# Patient Record
Sex: MALE | Race: WHITE | NOT HISPANIC OR LATINO
[De-identification: names, ages, dates, MRNs, and addresses within clinical notes are randomized per-mention and may not be internally consistent; named-entity substitution may affect disease eponyms.]

---

## 2017-02-15 ENCOUNTER — RX RENEWAL (OUTPATIENT)
Age: 67
End: 2017-02-15

## 2019-01-27 ENCOUNTER — INPATIENT (INPATIENT)
Facility: HOSPITAL | Age: 69
LOS: 2 days | Discharge: HOME | End: 2019-01-30
Attending: INTERNAL MEDICINE | Admitting: INTERNAL MEDICINE
Payer: COMMERCIAL

## 2019-01-27 VITALS
SYSTOLIC BLOOD PRESSURE: 136 MMHG | RESPIRATION RATE: 16 BRPM | OXYGEN SATURATION: 100 % | HEART RATE: 74 BPM | DIASTOLIC BLOOD PRESSURE: 92 MMHG | TEMPERATURE: 99 F

## 2019-01-27 DIAGNOSIS — I10 ESSENTIAL (PRIMARY) HYPERTENSION: ICD-10-CM

## 2019-01-27 DIAGNOSIS — R07.9 CHEST PAIN, UNSPECIFIED: ICD-10-CM

## 2019-01-27 DIAGNOSIS — Z95.1 PRESENCE OF AORTOCORONARY BYPASS GRAFT: Chronic | ICD-10-CM

## 2019-01-27 DIAGNOSIS — I25.110 ATHEROSCLEROTIC HEART DISEASE OF NATIVE CORONARY ARTERY WITH UNSTABLE ANGINA PECTORIS: ICD-10-CM

## 2019-01-27 DIAGNOSIS — Z29.9 ENCOUNTER FOR PROPHYLACTIC MEASURES, UNSPECIFIED: ICD-10-CM

## 2019-01-27 DIAGNOSIS — M79.2 NEURALGIA AND NEURITIS, UNSPECIFIED: ICD-10-CM

## 2019-01-27 DIAGNOSIS — E11.9 TYPE 2 DIABETES MELLITUS WITHOUT COMPLICATIONS: ICD-10-CM

## 2019-01-27 DIAGNOSIS — Z95.4 PRESENCE OF OTHER HEART-VALVE REPLACEMENT: Chronic | ICD-10-CM

## 2019-01-27 LAB
ALBUMIN SERPL ELPH-MCNC: 3.9 G/DL — SIGNIFICANT CHANGE UP (ref 3.3–5)
ALP SERPL-CCNC: 73 U/L — SIGNIFICANT CHANGE UP (ref 40–120)
ALT FLD-CCNC: 19 U/L — SIGNIFICANT CHANGE UP (ref 4–41)
ANION GAP SERPL CALC-SCNC: 11 MMO/L — SIGNIFICANT CHANGE UP (ref 7–14)
APTT BLD: 30.5 SEC — SIGNIFICANT CHANGE UP (ref 27.5–36.3)
AST SERPL-CCNC: 20 U/L — SIGNIFICANT CHANGE UP (ref 4–40)
BASE EXCESS BLDV CALC-SCNC: 1.2 MMOL/L — SIGNIFICANT CHANGE UP
BASOPHILS # BLD AUTO: 0.05 K/UL — SIGNIFICANT CHANGE UP (ref 0–0.2)
BASOPHILS NFR BLD AUTO: 0.7 % — SIGNIFICANT CHANGE UP (ref 0–2)
BILIRUB SERPL-MCNC: 0.4 MG/DL — SIGNIFICANT CHANGE UP (ref 0.2–1.2)
BLOOD GAS VENOUS - CREATININE: 0.74 MG/DL — SIGNIFICANT CHANGE UP (ref 0.5–1.3)
BUN SERPL-MCNC: 13 MG/DL — SIGNIFICANT CHANGE UP (ref 7–23)
CALCIUM SERPL-MCNC: 8.7 MG/DL — SIGNIFICANT CHANGE UP (ref 8.4–10.5)
CHLORIDE BLDV-SCNC: 113 MMOL/L — HIGH (ref 96–108)
CHLORIDE SERPL-SCNC: 106 MMOL/L — SIGNIFICANT CHANGE UP (ref 98–107)
CK MB BLD-MCNC: 2.21 NG/ML — SIGNIFICANT CHANGE UP (ref 1–6.6)
CK SERPL-CCNC: 73 U/L — SIGNIFICANT CHANGE UP (ref 30–200)
CO2 SERPL-SCNC: 24 MMOL/L — SIGNIFICANT CHANGE UP (ref 22–31)
CREAT SERPL-MCNC: 0.96 MG/DL — SIGNIFICANT CHANGE UP (ref 0.5–1.3)
EOSINOPHIL # BLD AUTO: 0.14 K/UL — SIGNIFICANT CHANGE UP (ref 0–0.5)
EOSINOPHIL NFR BLD AUTO: 1.9 % — SIGNIFICANT CHANGE UP (ref 0–6)
GAS PNL BLDV: 136 MMOL/L — SIGNIFICANT CHANGE UP (ref 136–146)
GLUCOSE BLDC GLUCOMTR-MCNC: 143 MG/DL — HIGH (ref 70–99)
GLUCOSE BLDC GLUCOMTR-MCNC: 149 MG/DL — HIGH (ref 70–99)
GLUCOSE BLDV-MCNC: 135 — HIGH (ref 70–99)
GLUCOSE SERPL-MCNC: 130 MG/DL — HIGH (ref 70–99)
HCO3 BLDV-SCNC: 25 MMOL/L — SIGNIFICANT CHANGE UP (ref 20–27)
HCT VFR BLD CALC: 40.8 % — SIGNIFICANT CHANGE UP (ref 39–50)
HCT VFR BLDV CALC: 44 % — SIGNIFICANT CHANGE UP (ref 39–51)
HGB BLD-MCNC: 14 G/DL — SIGNIFICANT CHANGE UP (ref 13–17)
HGB BLDV-MCNC: 14.3 G/DL — SIGNIFICANT CHANGE UP (ref 13–17)
IMM GRANULOCYTES NFR BLD AUTO: 0.3 % — SIGNIFICANT CHANGE UP (ref 0–1.5)
INR BLD: 1.14 — SIGNIFICANT CHANGE UP (ref 0.88–1.17)
LACTATE BLDV-MCNC: 1.3 MMOL/L — SIGNIFICANT CHANGE UP (ref 0.5–2)
LYMPHOCYTES # BLD AUTO: 2.23 K/UL — SIGNIFICANT CHANGE UP (ref 1–3.3)
LYMPHOCYTES # BLD AUTO: 30.4 % — SIGNIFICANT CHANGE UP (ref 13–44)
MCHC RBC-ENTMCNC: 31.7 PG — SIGNIFICANT CHANGE UP (ref 27–34)
MCHC RBC-ENTMCNC: 34.3 % — SIGNIFICANT CHANGE UP (ref 32–36)
MCV RBC AUTO: 92.5 FL — SIGNIFICANT CHANGE UP (ref 80–100)
MONOCYTES # BLD AUTO: 0.58 K/UL — SIGNIFICANT CHANGE UP (ref 0–0.9)
MONOCYTES NFR BLD AUTO: 7.9 % — SIGNIFICANT CHANGE UP (ref 2–14)
NEUTROPHILS # BLD AUTO: 4.32 K/UL — SIGNIFICANT CHANGE UP (ref 1.8–7.4)
NEUTROPHILS NFR BLD AUTO: 58.8 % — SIGNIFICANT CHANGE UP (ref 43–77)
NRBC # FLD: 0 K/UL — LOW (ref 25–125)
NT-PROBNP SERPL-SCNC: 156.3 PG/ML — SIGNIFICANT CHANGE UP
PCO2 BLDV: 46 MMHG — SIGNIFICANT CHANGE UP (ref 41–51)
PH BLDV: 7.37 PH — SIGNIFICANT CHANGE UP (ref 7.32–7.43)
PLATELET # BLD AUTO: 295 K/UL — SIGNIFICANT CHANGE UP (ref 150–400)
PMV BLD: 9.6 FL — SIGNIFICANT CHANGE UP (ref 7–13)
PO2 BLDV: 40 MMHG — SIGNIFICANT CHANGE UP (ref 35–40)
POTASSIUM BLDV-SCNC: 3.8 MMOL/L — SIGNIFICANT CHANGE UP (ref 3.4–4.5)
POTASSIUM SERPL-MCNC: 4.1 MMOL/L — SIGNIFICANT CHANGE UP (ref 3.5–5.3)
POTASSIUM SERPL-SCNC: 4.1 MMOL/L — SIGNIFICANT CHANGE UP (ref 3.5–5.3)
PROT SERPL-MCNC: 7 G/DL — SIGNIFICANT CHANGE UP (ref 6–8.3)
PROTHROM AB SERPL-ACNC: 13 SEC — SIGNIFICANT CHANGE UP (ref 9.8–13.1)
RBC # BLD: 4.41 M/UL — SIGNIFICANT CHANGE UP (ref 4.2–5.8)
RBC # FLD: 12.7 % — SIGNIFICANT CHANGE UP (ref 10.3–14.5)
SAO2 % BLDV: 74.1 % — SIGNIFICANT CHANGE UP (ref 60–85)
SODIUM SERPL-SCNC: 141 MMOL/L — SIGNIFICANT CHANGE UP (ref 135–145)
TROPONIN T, HIGH SENSITIVITY: 9 NG/L — SIGNIFICANT CHANGE UP (ref ?–14)
TROPONIN T, HIGH SENSITIVITY: 9 NG/L — SIGNIFICANT CHANGE UP (ref ?–14)
WBC # BLD: 7.34 K/UL — SIGNIFICANT CHANGE UP (ref 3.8–10.5)
WBC # FLD AUTO: 7.34 K/UL — SIGNIFICANT CHANGE UP (ref 3.8–10.5)

## 2019-01-27 PROCEDURE — 71046 X-RAY EXAM CHEST 2 VIEWS: CPT | Mod: 26

## 2019-01-27 PROCEDURE — 99223 1ST HOSP IP/OBS HIGH 75: CPT

## 2019-01-27 RX ORDER — CLOPIDOGREL BISULFATE 75 MG/1
75 TABLET, FILM COATED ORAL DAILY
Qty: 0 | Refills: 0 | Status: DISCONTINUED | OUTPATIENT
Start: 2019-01-28 | End: 2019-01-30

## 2019-01-27 RX ORDER — ASPIRIN/CALCIUM CARB/MAGNESIUM 324 MG
81 TABLET ORAL DAILY
Qty: 0 | Refills: 0 | Status: DISCONTINUED | OUTPATIENT
Start: 2019-01-28 | End: 2019-01-30

## 2019-01-27 RX ORDER — DEXTROSE 50 % IN WATER 50 %
12.5 SYRINGE (ML) INTRAVENOUS ONCE
Qty: 0 | Refills: 0 | Status: DISCONTINUED | OUTPATIENT
Start: 2019-01-27 | End: 2019-01-30

## 2019-01-27 RX ORDER — DEXTROSE 50 % IN WATER 50 %
25 SYRINGE (ML) INTRAVENOUS ONCE
Qty: 0 | Refills: 0 | Status: DISCONTINUED | OUTPATIENT
Start: 2019-01-27 | End: 2019-01-30

## 2019-01-27 RX ORDER — INSULIN LISPRO 100/ML
VIAL (ML) SUBCUTANEOUS AT BEDTIME
Qty: 0 | Refills: 0 | Status: DISCONTINUED | OUTPATIENT
Start: 2019-01-27 | End: 2019-01-30

## 2019-01-27 RX ORDER — ISOSORBIDE MONONITRATE 60 MG/1
30 TABLET, EXTENDED RELEASE ORAL DAILY
Qty: 0 | Refills: 0 | Status: DISCONTINUED | OUTPATIENT
Start: 2019-01-27 | End: 2019-01-30

## 2019-01-27 RX ORDER — DEXTROSE 50 % IN WATER 50 %
15 SYRINGE (ML) INTRAVENOUS ONCE
Qty: 0 | Refills: 0 | Status: DISCONTINUED | OUTPATIENT
Start: 2019-01-27 | End: 2019-01-30

## 2019-01-27 RX ORDER — ASPIRIN/CALCIUM CARB/MAGNESIUM 324 MG
162 TABLET ORAL ONCE
Qty: 0 | Refills: 0 | Status: COMPLETED | OUTPATIENT
Start: 2019-01-27 | End: 2019-01-27

## 2019-01-27 RX ORDER — INSULIN LISPRO 100/ML
VIAL (ML) SUBCUTANEOUS
Qty: 0 | Refills: 0 | Status: DISCONTINUED | OUTPATIENT
Start: 2019-01-27 | End: 2019-01-30

## 2019-01-27 RX ORDER — HEPARIN SODIUM 5000 [USP'U]/ML
INJECTION INTRAVENOUS; SUBCUTANEOUS
Qty: 25000 | Refills: 0 | Status: DISCONTINUED | OUTPATIENT
Start: 2019-01-27 | End: 2019-01-28

## 2019-01-27 RX ORDER — CLOPIDOGREL BISULFATE 75 MG/1
TABLET, FILM COATED ORAL
Qty: 0 | Refills: 0 | Status: DISCONTINUED | OUTPATIENT
Start: 2019-01-27 | End: 2019-01-30

## 2019-01-27 RX ORDER — ACETAMINOPHEN 500 MG
650 TABLET ORAL ONCE
Qty: 0 | Refills: 0 | Status: COMPLETED | OUTPATIENT
Start: 2019-01-27 | End: 2019-01-27

## 2019-01-27 RX ORDER — HEPARIN SODIUM 5000 [USP'U]/ML
6000 INJECTION INTRAVENOUS; SUBCUTANEOUS EVERY 6 HOURS
Qty: 0 | Refills: 0 | Status: DISCONTINUED | OUTPATIENT
Start: 2019-01-27 | End: 2019-01-28

## 2019-01-27 RX ORDER — CLOPIDOGREL BISULFATE 75 MG/1
600 TABLET, FILM COATED ORAL ONCE
Qty: 0 | Refills: 0 | Status: COMPLETED | OUTPATIENT
Start: 2019-01-27 | End: 2019-01-27

## 2019-01-27 RX ORDER — GLUCAGON INJECTION, SOLUTION 0.5 MG/.1ML
1 INJECTION, SOLUTION SUBCUTANEOUS ONCE
Qty: 0 | Refills: 0 | Status: DISCONTINUED | OUTPATIENT
Start: 2019-01-27 | End: 2019-01-30

## 2019-01-27 RX ORDER — LISINOPRIL 2.5 MG/1
5 TABLET ORAL DAILY
Qty: 0 | Refills: 0 | Status: DISCONTINUED | OUTPATIENT
Start: 2019-01-27 | End: 2019-01-30

## 2019-01-27 RX ORDER — METOPROLOL TARTRATE 50 MG
25 TABLET ORAL
Qty: 0 | Refills: 0 | Status: DISCONTINUED | OUTPATIENT
Start: 2019-01-27 | End: 2019-01-30

## 2019-01-27 RX ORDER — HEPARIN SODIUM 5000 [USP'U]/ML
5000 INJECTION INTRAVENOUS; SUBCUTANEOUS ONCE
Qty: 0 | Refills: 0 | Status: COMPLETED | OUTPATIENT
Start: 2019-01-27 | End: 2019-01-27

## 2019-01-27 RX ORDER — ATORVASTATIN CALCIUM 80 MG/1
80 TABLET, FILM COATED ORAL AT BEDTIME
Qty: 0 | Refills: 0 | Status: DISCONTINUED | OUTPATIENT
Start: 2019-01-27 | End: 2019-01-30

## 2019-01-27 RX ADMIN — Medication 25 MILLIGRAM(S): at 19:08

## 2019-01-27 RX ADMIN — CLOPIDOGREL BISULFATE 600 MILLIGRAM(S): 75 TABLET, FILM COATED ORAL at 19:08

## 2019-01-27 RX ADMIN — HEPARIN SODIUM 1000 UNIT(S)/HR: 5000 INJECTION INTRAVENOUS; SUBCUTANEOUS at 19:09

## 2019-01-27 RX ADMIN — ATORVASTATIN CALCIUM 80 MILLIGRAM(S): 80 TABLET, FILM COATED ORAL at 21:45

## 2019-01-27 RX ADMIN — ISOSORBIDE MONONITRATE 30 MILLIGRAM(S): 60 TABLET, EXTENDED RELEASE ORAL at 19:08

## 2019-01-27 RX ADMIN — HEPARIN SODIUM 5000 UNIT(S): 5000 INJECTION INTRAVENOUS; SUBCUTANEOUS at 19:08

## 2019-01-27 RX ADMIN — Medication 650 MILLIGRAM(S): at 22:03

## 2019-01-27 RX ADMIN — Medication 162 MILLIGRAM(S): at 12:56

## 2019-01-27 NOTE — CONSULT NOTE ADULT - ATTENDING COMMENTS
Patient seen and examined with and above note reviewed and I agree with assessment and plan as outlined. Patient presenting with intermittent right arm numbness and tingling and cramping pains in his right arm. Worse with increased use and repetitive movements and associated with right neck pain and discomfort and headaches.  Exam shows tenderness to palpation over right trapezius and deltoid but strength is intact and sensation intact to LT and PP and reflexes 1 to 2 plus in bilateral arms.   Worsening of symptoms with neck rotation to the right.  Will proceed with cervical MRI to assess fro cervical radiculopathy and continue heat and ice application as needed.  PT evaluation   Continue cardiac and medical mgt and supportive care and all questions answered and patient understood plan.

## 2019-01-27 NOTE — H&P ADULT - NEUROLOGICAL DETAILS
responds to pain/sensation intact/cranial nerves intact/alert and oriented x 3/responds to verbal commands

## 2019-01-27 NOTE — CONSULT NOTE ADULT - SUBJECTIVE AND OBJECTIVE BOX
*************************************  NEUROLOGY CONSULT  NOTE  **************************************    IVANNA JETT  Male  [id number]    HPI:   69 yo RH  man w/ h/o CABG, HTN, R clavicular Fx (in 1990s) presents with intermittent chest pain over past 2 days. Neuro consulted for RUE crampy pain associated with intermittent weakness of hand going on for > 1month as per patient. States intermittently his RUE biceps get crampy pain; and sometimes he suddenly drops things from his right hand. Reports its mostly the right hand; the arm and forearm usually feels normal strength during these episodes. Pt currently asymptomatic; Exam is non focal except for mild tenderness to deep palpation over R lateral neck. Pt states he works as a . No Hx of neck manipulation, trauma, stroke or seizure.   Patient denies new trauma, LOC, fever, chills, HA, vision changes, tinnitus, dysarthria, dysphagia, palpitations, SOB, nausea, vomiting, diarrhea, dysuria and incontinence.  NIHSS =0, MRS =0      ROS: All negative except as mentioned in HPI    PAST MEDICAL & SURGICAL HISTORY:  Myocardial infarct  H/O heart bypass surgery  Valvular disease  Myocardial infarct  Diabetes mellitus  Scarlet Fever  Systolic Murmur  Other and Unspecified Hyperlipidemia  Benign Essential Hypertension  History of heart valve replacement  S/P CABG (coronary artery bypass graft)  Traumatic Amputation of Finger(s)    Allergies  No Known Allergies  Intolerances    VITAL SIGNS:  Vital Signs Last 24 Hrs  T(C): 37.1 (27 Jan 2019 12:59), Max: 37.1 (27 Jan 2019 11:19)  T(F): 98.8 (27 Jan 2019 12:59), Max: 98.8 (27 Jan 2019 11:19)  HR: 72 (27 Jan 2019 12:59) (72 - 74)  BP: 142/86 (27 Jan 2019 12:59) (136/92 - 142/86)  BP(mean): --  RR: 16 (27 Jan 2019 12:59) (16 - 16)  SpO2: 100% (27 Jan 2019 12:59) (100% - 100%)    PHYSICAL EXAMINATION:  General: obese, in no acute distress.  Eyes: Conjunctiva and sclera clear.  Neck: Supple, full rom, mild tenderness over the R clavicular region and C-spine, which produces his crampy pain in RUE  Skin: no rash, no edema noted  Lung: no respiratory distress noted  Neurologic:  - Mental Status:  Alert, awake, oriented to person, place, and time; Speech is fluent with intact naming, repetition, and comprehension; crosses midline, no extinction or neglect noted; good recall  - Cranial Nerves II-XII:  VFF, No nystagmus or APD noted, EOMI, PERRLA, V1-V3 intact, no facial asymmetry, t/p midline, SCM/trap intact.  - Motor:  Strength is 5/5 throughout including RUE proximally and distally.  There is no pronator drift.  Normal muscle bulk and tone throughout. No myoclonus or tremor.  - Reflexes:  1+ and symmetric at the biceps, triceps, brachioradialis, knees, and ankles.  Plantar responses mute.  - Sensory:  Intact to light touch throughout; states RUE more mildly more sensitive than LUE  - Coordination:  Finger-nose-finger and heel-knee-shin intact without dysmetria.  Rapid alternating hand movements intact.  - Gait:   Normal steps, base, arm swing, and turning.  Heel and toe walking are normal.  Tandem gait is normal.  Romberg testing is negative.    LABS:                          14.0   7.34  )-----------( 295      ( 27 Jan 2019 12:50 )             40.8     01-27    141  |  106  |  13  ----------------------------<  130<H>  4.1   |  24  |  0.96    Ca    8.7      27 Jan 2019 12:50    TPro  7.0  /  Alb  3.9  /  TBili  0.4  /  DBili  x   /  AST  20  /  ALT  19  /  AlkPhos  73  01-27        RADIOLOGY & ADDITIONAL STUDIES:    non available

## 2019-01-27 NOTE — H&P ADULT - ASSESSMENT
67 yo male PMHx CAD, CABG x2, HTN, HLD, and DM2 (non-compliant w/ meds x 2  years) p/w chest pain at rest x 3 days, similar to one he had before CABG admitted to [facility] for Unstable Angina Pectoris.    +Unstable Angina Pectoris-->Plavix load, Hep gtt (after coags), Statin, ASA.

## 2019-01-27 NOTE — H&P ADULT - HISTORY OF PRESENT ILLNESS
69 yo male PMHx CAD, CABG x2, HTN, HLD, and DM2 (non-compliant w/ meds x 2  years) p/w chest pain at rest x 3 days. Chest pain left sided chest tightness, 4/10, intermittent in nature where each episode lasts 1 hour, non-pleuritic, non-radiating in nature. Chest pain getting worse since yesterday where episodes are more frequent and similar to one he had before CABG 7 yrs ago. Pt also c/o right shoulder pain radiating to R arm for past 1 month. Pt denies weakness, fever, chills, diaphoresis, SOB, palpitations, nausea, vomiting or abdominal pain. No alleviating nor aggravating factors identified.    Off note: pt lost his PCP and insurance 2 years ago and had been off his home medications ever since. Reports now he has new health insurance, but still doesn't have PCP.

## 2019-01-27 NOTE — ED PROVIDER NOTE - PMH
Benign Essential Hypertension    Diabetes mellitus    H/O heart bypass surgery    Myocardial infarct    Myocardial infarct    Other and Unspecified Hyperlipidemia    Scarlet Fever    Systolic Murmur    Valvular disease

## 2019-01-27 NOTE — H&P ADULT - PROBLEM SELECTOR PLAN 1
tele monitor   cardiac enzymes x 2  Serial EKGs  DASH 1800 ADA diet   FLP, HgA1c, TSH  Discussed with Dr. Helms: will load pt with Plavix 600mg, then 75mg Daily.  Will start heparin drip after INR/PTT results come back.  Will start on Metoprolol 25mg po BID, ASA 81mg, Lipitor 80mg qhs, Lisinopril 5mg and imdur 30mg po daily.  NPO p MN for Cardia Cath tomorrow

## 2019-01-27 NOTE — ED PROVIDER NOTE - MUSCULOSKELETAL, MLM
Spine appears normal, no midline TTP.  5/5 strength in extremities currently.  +weeping blister to left shin with 2+ pitting edema BLE

## 2019-01-27 NOTE — ED ADULT NURSE NOTE - OBJECTIVE STATEMENT
Pt w/ hx of MI CAD no-anticoagulants CHF  c/o intermittent chest pain x 2 days w/ associated BLE swelling and right hand weakness Pt denies SOB diaphoresis HA Dizziness NV Pt p/w NAD breaths even unlabored skin warm dry intact NSR on monitor 20 g IV access obtained at L.forearm labs as ordered. Will monitor.

## 2019-01-27 NOTE — ED ADULT NURSE NOTE - CHIEF COMPLAINT QUOTE
pt here for intermittent chest pain x 2 days (pain feels similar to when he needed bypass surgery), right arm pain since last night, intermittent right arm weakness x 1 week, and weeping to left lower extremity x 3 days. pmhx: mi, cabg and valve replacement

## 2019-01-27 NOTE — CONSULT NOTE ADULT - ASSESSMENT
69 yo RH  man w/ h/o CABG, HTN, R clavicular Fx (in 1990s) presents with intermittent chest pain over past 2 days. Neuro consulted for RUE crampy pain associated with intermittent weakness of hand going on for > 1month as per patient. States intermittently his RUE biceps get crampy pain; and sometimes he suddenly drops things from his right hand.     Impression: Sx and hx indicative of radicular etiology of his RUE Sx given subacute nature and reproducible exam.    - MRI C-spine w/o   - PT/OT eval  - chest pain w/u as per primary team, cardio  - further reccs based on above

## 2019-01-27 NOTE — H&P ADULT - NSHPLABSRESULTS_GEN_ALL_CORE
EKG: Sinus Rhythm 1st deg HB @ 66 bpm, TWI I, AVL. Q-waves V1-V2. Poor R-wave V1-V6, LAD.  Trop: 9-->9  proBNP: 156  CXR: Status post median sternotomy. Lungs are clear. Status post aortic valve replacement. There is no acute abnormality of the visualized osseous structures. There is a healed right clavicular fracture.

## 2019-01-27 NOTE — ED ADULT NURSE NOTE - PSH
History of heart valve replacement    S/P CABG (coronary artery bypass graft)    Traumatic Amputation of Finger(s)

## 2019-01-27 NOTE — H&P ADULT - RS GEN PE MLT RESP DETAILS PC
no rhonchi/no wheezes/respirations non-labored/breath sounds equal/clear to auscultation bilaterally/good air movement/no rales/no chest wall tenderness/airway patent

## 2019-01-27 NOTE — ED PROVIDER NOTE - MEDICAL DECISION MAKING DETAILS
# CP, concern for ACS  - cbc, cmp, trop, ekg,. cxr, plan for tele admission  # R hand weakness  - likely cervical radiculopathy, consider MRI while inpatient

## 2019-01-27 NOTE — ED PROVIDER NOTE - OBJECTIVE STATEMENT
68M h/o CABG, HTN, presents with intermittent chest pain over past 2 days.  Pain is left sided and radiates to right shoulder, not exertionally related.  States it feels similar to previous MI.  +Intermittent dizziness/lightheadedness.  Separately, patient also c/o intermittent right hand weakness over past week.  Notices that he intermittently drops items.  +neck pain at times.  Also c/o tinnitus (long standing).  Self discontinued aspirin.

## 2019-01-27 NOTE — H&P ADULT - PROBLEM SELECTOR PLAN 2
Daily glucose monitoring  insulin sliding scale  DASH 1800 ADA diet  started Lisinopril 5mg po daily (renal protective) and ASA 81mg  serum HgA1C

## 2019-01-27 NOTE — ED ADULT TRIAGE NOTE - CHIEF COMPLAINT QUOTE
pt here for intermittent chest pain x 2 days (pain feels similar to when he needed bypass surgery), right arm pain since last night, intermittent right arm weakness x 1 week, and weeping to left lower extremity x 3 days. pmhx: cabg and valve replacement pt here for intermittent chest pain x 2 days (pain feels similar to when he needed bypass surgery), right arm pain since last night, intermittent right arm weakness x 1 week, and weeping to left lower extremity x 3 days. pmhx: mi, cabg and valve replacement

## 2019-01-27 NOTE — H&P ADULT - NSHPSOCIALHISTORY_GEN_ALL_CORE
Pt , lives with spouse. Denies smoking, drinking or drugs.   Pt admits to drinking 12 cups of coffee/day.  Pt admits to walking freely without any aids and able to walk at least 5 blocks.

## 2019-01-27 NOTE — ED PROVIDER NOTE - ATTENDING CONTRIBUTION TO CARE
I performed the initial face to face bedside interview with this patient regarding history of present illness, review of symptoms and past medical, social and family history.  I completed an independent physical examination.  I was the initial provider who evaluated this patient.  The history, review of symptoms and examination was documented by me.  I have signed out the follow up of any pending tests (i.e. labs, radiological studies) to the resident.  I have discussed the patient’s plan of care and disposition with the residemt/

## 2019-01-28 LAB
ANION GAP SERPL CALC-SCNC: 10 MMO/L — SIGNIFICANT CHANGE UP (ref 7–14)
APTT BLD: 41.8 SEC — HIGH (ref 27.5–36.3)
APTT BLD: 48 SEC — HIGH (ref 27.5–36.3)
BUN SERPL-MCNC: 15 MG/DL — SIGNIFICANT CHANGE UP (ref 7–23)
CALCIUM SERPL-MCNC: 8.5 MG/DL — SIGNIFICANT CHANGE UP (ref 8.4–10.5)
CHLORIDE SERPL-SCNC: 108 MMOL/L — HIGH (ref 98–107)
CHOLEST SERPL-MCNC: 183 MG/DL — SIGNIFICANT CHANGE UP (ref 120–199)
CO2 SERPL-SCNC: 23 MMOL/L — SIGNIFICANT CHANGE UP (ref 22–31)
CREAT SERPL-MCNC: 0.96 MG/DL — SIGNIFICANT CHANGE UP (ref 0.5–1.3)
GLUCOSE BLDC GLUCOMTR-MCNC: 100 MG/DL — HIGH (ref 70–99)
GLUCOSE BLDC GLUCOMTR-MCNC: 125 MG/DL — HIGH (ref 70–99)
GLUCOSE BLDC GLUCOMTR-MCNC: 142 MG/DL — HIGH (ref 70–99)
GLUCOSE BLDC GLUCOMTR-MCNC: 159 MG/DL — HIGH (ref 70–99)
GLUCOSE SERPL-MCNC: 144 MG/DL — HIGH (ref 70–99)
HBA1C BLD-MCNC: 7 % — HIGH (ref 4–5.6)
HCT VFR BLD CALC: 39.1 % — SIGNIFICANT CHANGE UP (ref 39–50)
HCT VFR BLD CALC: 39.2 % — SIGNIFICANT CHANGE UP (ref 39–50)
HCV AB S/CO SERPL IA: 0.18 S/CO — SIGNIFICANT CHANGE UP
HCV AB SERPL-IMP: SIGNIFICANT CHANGE UP
HCV RNA SERPL NAA DL=5-ACNC: NOT DETECTED IU/ML — SIGNIFICANT CHANGE UP
HCV RNA SPEC NAA+PROBE-LOG IU: SIGNIFICANT CHANGE UP LOGIU/ML
HDLC SERPL-MCNC: 29 MG/DL — LOW (ref 35–55)
HGB BLD-MCNC: 13.1 G/DL — SIGNIFICANT CHANGE UP (ref 13–17)
HGB BLD-MCNC: 13.2 G/DL — SIGNIFICANT CHANGE UP (ref 13–17)
HIV 1+2 AB+HIV1 P24 AG SERPL QL IA: SIGNIFICANT CHANGE UP
LIPID PNL WITH DIRECT LDL SERPL: 144 MG/DL — SIGNIFICANT CHANGE UP
MCHC RBC-ENTMCNC: 31.1 PG — SIGNIFICANT CHANGE UP (ref 27–34)
MCHC RBC-ENTMCNC: 31.5 PG — SIGNIFICANT CHANGE UP (ref 27–34)
MCHC RBC-ENTMCNC: 33.5 % — SIGNIFICANT CHANGE UP (ref 32–36)
MCHC RBC-ENTMCNC: 33.7 % — SIGNIFICANT CHANGE UP (ref 32–36)
MCV RBC AUTO: 92.5 FL — SIGNIFICANT CHANGE UP (ref 80–100)
MCV RBC AUTO: 94 FL — SIGNIFICANT CHANGE UP (ref 80–100)
NRBC # FLD: 0 K/UL — LOW (ref 25–125)
NRBC # FLD: 0 K/UL — LOW (ref 25–125)
PLATELET # BLD AUTO: 284 K/UL — SIGNIFICANT CHANGE UP (ref 150–400)
PLATELET # BLD AUTO: 315 K/UL — SIGNIFICANT CHANGE UP (ref 150–400)
PMV BLD: 10.1 FL — SIGNIFICANT CHANGE UP (ref 7–13)
PMV BLD: 10.2 FL — SIGNIFICANT CHANGE UP (ref 7–13)
POTASSIUM SERPL-MCNC: 4.1 MMOL/L — SIGNIFICANT CHANGE UP (ref 3.5–5.3)
POTASSIUM SERPL-SCNC: 4.1 MMOL/L — SIGNIFICANT CHANGE UP (ref 3.5–5.3)
RBC # BLD: 4.16 M/UL — LOW (ref 4.2–5.8)
RBC # BLD: 4.24 M/UL — SIGNIFICANT CHANGE UP (ref 4.2–5.8)
RBC # FLD: 13.1 % — SIGNIFICANT CHANGE UP (ref 10.3–14.5)
RBC # FLD: 13.2 % — SIGNIFICANT CHANGE UP (ref 10.3–14.5)
SODIUM SERPL-SCNC: 141 MMOL/L — SIGNIFICANT CHANGE UP (ref 135–145)
TRIGL SERPL-MCNC: 168 MG/DL — HIGH (ref 10–149)
WBC # BLD: 7.19 K/UL — SIGNIFICANT CHANGE UP (ref 3.8–10.5)
WBC # BLD: 8.82 K/UL — SIGNIFICANT CHANGE UP (ref 3.8–10.5)
WBC # FLD AUTO: 7.19 K/UL — SIGNIFICANT CHANGE UP (ref 3.8–10.5)
WBC # FLD AUTO: 8.82 K/UL — SIGNIFICANT CHANGE UP (ref 3.8–10.5)

## 2019-01-28 PROCEDURE — 92928 PRQ TCAT PLMT NTRAC ST 1 LES: CPT | Mod: LC

## 2019-01-28 PROCEDURE — 93455 CORONARY ART/GRFT ANGIO S&I: CPT | Mod: 26,59

## 2019-01-28 PROCEDURE — 76937 US GUIDE VASCULAR ACCESS: CPT | Mod: 26

## 2019-01-28 PROCEDURE — 93571 IV DOP VEL&/PRESS C FLO 1ST: CPT | Mod: 26,LC

## 2019-01-28 PROCEDURE — 99152 MOD SED SAME PHYS/QHP 5/>YRS: CPT

## 2019-01-28 PROCEDURE — 99221 1ST HOSP IP/OBS SF/LOW 40: CPT | Mod: GC

## 2019-01-28 PROCEDURE — 93567 NJX CAR CTH SPRVLV AORTGRPHY: CPT

## 2019-01-28 PROCEDURE — 93010 ELECTROCARDIOGRAM REPORT: CPT

## 2019-01-28 PROCEDURE — 99233 SBSQ HOSP IP/OBS HIGH 50: CPT

## 2019-01-28 RX ORDER — OXYCODONE AND ACETAMINOPHEN 5; 325 MG/1; MG/1
1 TABLET ORAL ONCE
Qty: 0 | Refills: 0 | Status: DISCONTINUED | OUTPATIENT
Start: 2019-01-28 | End: 2019-01-28

## 2019-01-28 RX ORDER — ACETAMINOPHEN 500 MG
650 TABLET ORAL EVERY 6 HOURS
Qty: 0 | Refills: 0 | Status: DISCONTINUED | OUTPATIENT
Start: 2019-01-28 | End: 2019-01-30

## 2019-01-28 RX ADMIN — Medication 25 MILLIGRAM(S): at 18:02

## 2019-01-28 RX ADMIN — CLOPIDOGREL BISULFATE 75 MILLIGRAM(S): 75 TABLET, FILM COATED ORAL at 09:32

## 2019-01-28 RX ADMIN — Medication 81 MILLIGRAM(S): at 09:32

## 2019-01-28 RX ADMIN — HEPARIN SODIUM 1200 UNIT(S)/HR: 5000 INJECTION INTRAVENOUS; SUBCUTANEOUS at 02:06

## 2019-01-28 RX ADMIN — ISOSORBIDE MONONITRATE 30 MILLIGRAM(S): 60 TABLET, EXTENDED RELEASE ORAL at 18:02

## 2019-01-28 RX ADMIN — HEPARIN SODIUM 1400 UNIT(S)/HR: 5000 INJECTION INTRAVENOUS; SUBCUTANEOUS at 08:59

## 2019-01-28 RX ADMIN — LISINOPRIL 5 MILLIGRAM(S): 2.5 TABLET ORAL at 06:14

## 2019-01-28 RX ADMIN — OXYCODONE AND ACETAMINOPHEN 1 TABLET(S): 5; 325 TABLET ORAL at 22:28

## 2019-01-28 RX ADMIN — OXYCODONE AND ACETAMINOPHEN 1 TABLET(S): 5; 325 TABLET ORAL at 23:28

## 2019-01-28 RX ADMIN — Medication 650 MILLIGRAM(S): at 09:32

## 2019-01-28 RX ADMIN — ATORVASTATIN CALCIUM 80 MILLIGRAM(S): 80 TABLET, FILM COATED ORAL at 22:13

## 2019-01-28 NOTE — PROGRESS NOTE ADULT - PROBLEM SELECTOR PLAN 1
tele monitor   On DAPT  On heparin gtt for ACS/UA  On Metoprolol 25mg po BID, ASA 81mg, Lipitor 80mg qhs, Lisinopril 5mg and imdur 30mg po daily.  Plan for [facility] today

## 2019-01-28 NOTE — PROGRESS NOTE ADULT - ASSESSMENT
67 yo male PMHx CAD, CABG x2, HTN, HLD, and DM2 (non-compliant w/ meds x 2  years) p/w chest pain at rest x 3 days, similar to one he had before CABG admitted to [facility] for Unstable Angina Pectoris.    +Unstable Angina Pectoris-->Plavix load, Hep gtt (after coags), Statin, ASA.      Plan for [facility] today

## 2019-01-28 NOTE — OCCUPATIONAL THERAPY INITIAL EVALUATION ADULT - PERTINENT HX OF CURRENT PROBLEM, REHAB EVAL
Pt is a 68 year old male with PMHx of CAD, CABG x2, HTN, HLD, and DM2 (non-compliant w/ meds x 2  years), who presented to [facility] on 1/27/19 with chest pain at rest x 3 days. Pt also c/o right shoulder pain radiating to R arm for past 1 month. Xray Chest on 1/27/19 displayed there is no acute abnormality of the visualized osseous structures. There is a healed right clavicular fracture.

## 2019-01-28 NOTE — PROGRESS NOTE ADULT - SUBJECTIVE AND OBJECTIVE BOX
Cardiology Attending Progress Note      No new complaints  Reported having intermittent chest pressure over the last 24 hrs while at rest  Hemodynamically stable  No new events noted on telemetry (SR)    On heparin gtt for ACS/UA  Will arrange for [facility] today    Vital Signs Last 24 Hrs  T(C): 36.3 (28 Jan 2019 05:03), Max: 37.2 (28 Jan 2019 00:34)  T(F): 97.3 (28 Jan 2019 05:03), Max: 98.9 (28 Jan 2019 00:34)  HR: 56 (28 Jan 2019 05:03) (56 - 74)  BP: 108/67 (28 Jan 2019 05:03) (108/67 - 142/86)  BP(mean): --  RR: 18 (28 Jan 2019 05:03) (16 - 18)  SpO2: 99% (28 Jan 2019 05:03) (97% - 100%)    NAD  No JVD, laying flat in bed  Reg S1S2, 2/6 SM  Decreased breath sounds bilateral bases  Soft obese abdomen, NT ND  Min edema b/l LE    MEDICATIONS  (STANDING):  aspirin enteric coated 81 milliGRAM(s) Oral daily  atorvastatin 80 milliGRAM(s) Oral at bedtime  clopidogrel Tablet      clopidogrel Tablet 75 milliGRAM(s) Oral daily  dextrose 50% Injectable 12.5 Gram(s) IV Push once  dextrose 50% Injectable 25 Gram(s) IV Push once  dextrose 50% Injectable 25 Gram(s) IV Push once  heparin  Infusion.  Unit(s)/Hr (10 mL/Hr) IV Continuous <Continuous>  insulin lispro (HumaLOG) corrective regimen sliding scale   SubCutaneous three times a day before meals  insulin lispro (HumaLOG) corrective regimen sliding scale   SubCutaneous at bedtime  isosorbide   mononitrate ER Tablet (IMDUR) 30 milliGRAM(s) Oral daily  lisinopril 5 milliGRAM(s) Oral daily  metoprolol tartrate 25 milliGRAM(s) Oral two times a day    MEDICATIONS  (PRN):  dextrose 40% Gel 15 Gram(s) Oral once PRN Blood Glucose LESS THAN 70 milliGRAM(s)/deciliter  glucagon  Injectable 1 milliGRAM(s) IntraMuscular once PRN Glucose LESS THAN 70 milligrams/deciliter  heparin  Injectable 6000 Unit(s) IV Push every 6 hours PRN For aPTT less than 40                            13.1   7.19  )-----------( 284      ( 28 Jan 2019 06:14 )             39.1   01-28    141  |  108<H>  |  15  ----------------------------<  144<H>  4.1   |  23  |  0.96    Ca    8.5      28 Jan 2019 06:14    TPro  7.0  /  Alb  3.9  /  TBili  0.4  /  DBili  x   /  AST  20  /  ALT  19  /  AlkPhos  73  01-27    PT/INR - ( 27 Jan 2019 17:49 )   PT: 13.0 SEC;   INR: 1.14     PTT - ( 28 Jan 2019 01:10 )  PTT:41.8 SEC    < from: Xray Chest 2 Views PA/Lat (01.27.19 @ 13:17) >    EXAM:  XR CHEST PA LAT 2V        PROCEDURE DATE:  Jan 27 2019         INTERPRETATION:  CLINICAL INFORMATION: Chest pain    EXAM: PA and lateral chest radiographs    COMPARISON: Chest radiograph from 2/14/2012      IMPRESSION:     Status post mediansternotomy. Lungs are clear.  Status post aortic valve replacement.  There is no acute abnormality of the visualized osseous structures. There   is a healed right clavicular fracture.        TORRI MIX M.D., RADIOLOGY RESIDENT  This document has been electronically signed.  NOEL HERMAN M.D. ATTENDING RADIOLOGIST  This document has been electronically signed. Jan 27 2019  1:36PM

## 2019-01-28 NOTE — CHART NOTE - NSCHARTNOTEFT_GEN_A_CORE
Pt complained of headache with a severity of 9/10 and ringing in his ears since this morning without relief from Tylenol.  He denies blurred vision, neck stiffness, F/N/V/D, dizziness, weakness, paralysis, numbness, tingling sensation.    Vital Signs Last 24 Hrs  T(C): 36.7 (28 Jan 2019 18:00), Max: 37.2 (28 Jan 2019 00:34)  T(F): 98.1 (28 Jan 2019 18:00), Max: 98.9 (28 Jan 2019 00:34)  HR: 63 (28 Jan 2019 18:00) (56 - 63)  BP: 142/96 (28 Jan 2019 18:00) (108/67 - 142/96)  RR: 16 (28 Jan 2019 18:00) (16 - 18)  SpO2: 100% (28 Jan 2019 18:00) (97% - 100%)    PHYSICAL EXAM:  Constitutional: Well developed, no signs of acute distress, A&O X 3  Skin: No rash or erythema, good turgor  HEENT: Normocephalic, atraumatic, PERRLA, EOMI,  Neck: Supple, No Lymphadenopathy, No JVD  Extremities: Status post Cath, Left radial and Right femoral sites without bleeding or hematoma.  Dressing is intact.  Positive Pulses, capillary refill < 2 seconds. No amputations or deformities, no peripheral edema, peripheral pulses intact    Neurological: A&O x 3,  Gross motor and sensational intact. no focal deficits  Musculoskeletal: Normal gait, FROM, No tenderness, masses or effusion    This is a 68 year old male with a PMHx CAD, CABG x2, HTN, HLD, and DM2 (non-compliant w/ meds x 2  years) p/w chest pain at rest x 3 days admitted for Unstable Angina.  Pt underwent Cath s/p Stents now complaining of severe headache without neurological deficit without relief from Tylenol.     Percocet X 1 Ordered  Will Continue to monitor                                                                                                                                                RIGO Desai, MILLICENT-C [id number]

## 2019-01-29 LAB
ANION GAP SERPL CALC-SCNC: 10 MMO/L — SIGNIFICANT CHANGE UP (ref 7–14)
BUN SERPL-MCNC: 14 MG/DL — SIGNIFICANT CHANGE UP (ref 7–23)
CALCIUM SERPL-MCNC: 8.7 MG/DL — SIGNIFICANT CHANGE UP (ref 8.4–10.5)
CHLORIDE SERPL-SCNC: 104 MMOL/L — SIGNIFICANT CHANGE UP (ref 98–107)
CO2 SERPL-SCNC: 23 MMOL/L — SIGNIFICANT CHANGE UP (ref 22–31)
CREAT SERPL-MCNC: 1.02 MG/DL — SIGNIFICANT CHANGE UP (ref 0.5–1.3)
GLUCOSE BLDC GLUCOMTR-MCNC: 115 MG/DL — HIGH (ref 70–99)
GLUCOSE BLDC GLUCOMTR-MCNC: 119 MG/DL — HIGH (ref 70–99)
GLUCOSE BLDC GLUCOMTR-MCNC: 136 MG/DL — HIGH (ref 70–99)
GLUCOSE BLDC GLUCOMTR-MCNC: 138 MG/DL — HIGH (ref 70–99)
GLUCOSE SERPL-MCNC: 119 MG/DL — HIGH (ref 70–99)
HCT VFR BLD CALC: 38.7 % — LOW (ref 39–50)
HGB BLD-MCNC: 12.9 G/DL — LOW (ref 13–17)
MCHC RBC-ENTMCNC: 30.9 PG — SIGNIFICANT CHANGE UP (ref 27–34)
MCHC RBC-ENTMCNC: 33.3 % — SIGNIFICANT CHANGE UP (ref 32–36)
MCV RBC AUTO: 92.8 FL — SIGNIFICANT CHANGE UP (ref 80–100)
NRBC # FLD: 0 K/UL — LOW (ref 25–125)
PLATELET # BLD AUTO: 282 K/UL — SIGNIFICANT CHANGE UP (ref 150–400)
PMV BLD: 10.1 FL — SIGNIFICANT CHANGE UP (ref 7–13)
POTASSIUM SERPL-MCNC: 4.2 MMOL/L — SIGNIFICANT CHANGE UP (ref 3.5–5.3)
POTASSIUM SERPL-SCNC: 4.2 MMOL/L — SIGNIFICANT CHANGE UP (ref 3.5–5.3)
RBC # BLD: 4.17 M/UL — LOW (ref 4.2–5.8)
RBC # FLD: 12.9 % — SIGNIFICANT CHANGE UP (ref 10.3–14.5)
SODIUM SERPL-SCNC: 137 MMOL/L — SIGNIFICANT CHANGE UP (ref 135–145)
WBC # BLD: 8.91 K/UL — SIGNIFICANT CHANGE UP (ref 3.8–10.5)
WBC # FLD AUTO: 8.91 K/UL — SIGNIFICANT CHANGE UP (ref 3.8–10.5)

## 2019-01-29 PROCEDURE — 72141 MRI NECK SPINE W/O DYE: CPT | Mod: 26

## 2019-01-29 PROCEDURE — 99232 SBSQ HOSP IP/OBS MODERATE 35: CPT

## 2019-01-29 RX ORDER — OXYCODONE HYDROCHLORIDE 5 MG/1
5 TABLET ORAL ONCE
Qty: 0 | Refills: 0 | Status: DISCONTINUED | OUTPATIENT
Start: 2019-01-29 | End: 2019-01-29

## 2019-01-29 RX ADMIN — OXYCODONE HYDROCHLORIDE 5 MILLIGRAM(S): 5 TABLET ORAL at 13:37

## 2019-01-29 RX ADMIN — OXYCODONE HYDROCHLORIDE 5 MILLIGRAM(S): 5 TABLET ORAL at 14:30

## 2019-01-29 RX ADMIN — Medication 81 MILLIGRAM(S): at 11:57

## 2019-01-29 RX ADMIN — CLOPIDOGREL BISULFATE 75 MILLIGRAM(S): 75 TABLET, FILM COATED ORAL at 11:57

## 2019-01-29 RX ADMIN — ISOSORBIDE MONONITRATE 30 MILLIGRAM(S): 60 TABLET, EXTENDED RELEASE ORAL at 11:57

## 2019-01-29 RX ADMIN — ATORVASTATIN CALCIUM 80 MILLIGRAM(S): 80 TABLET, FILM COATED ORAL at 22:38

## 2019-01-29 RX ADMIN — LISINOPRIL 5 MILLIGRAM(S): 2.5 TABLET ORAL at 05:20

## 2019-01-29 RX ADMIN — Medication 25 MILLIGRAM(S): at 17:34

## 2019-01-29 NOTE — OCCUPATIONAL THERAPY INITIAL EVALUATION ADULT - MD ORDER
Occupational Therapy (OT) to evaluate and treat. Occupational Therapy (OT) to evaluate and treat. Ambulate with assistance. Per UBALDO CHE, pt is okay to participate in OT evaluation and perform activity as tolerated.

## 2019-01-29 NOTE — OCCUPATIONAL THERAPY INITIAL EVALUATION ADULT - GENERAL OBSERVATIONS, REHAB EVAL
Pt. received semisupine in bed. No acute distress. Patient agreed to evaluation from Occupational Therapist. +Heplock, +Tele.

## 2019-01-29 NOTE — OCCUPATIONAL THERAPY INITIAL EVALUATION ADULT - PERTINENT HX OF CURRENT PROBLEM, REHAB EVAL
Pt is a 68 year old male with PMHx of CAD, CABG x2, HTN, HLD, and DM2 (non-compliant w/ meds x 2  years), who presented to [facility] on 1/27/19 with chest pain at rest x 3 days. Pt also c/o right shoulder pain radiating to R arm for past 1 month. Xray Chest on 1/27/19 displayed there is no acute abnormality of the visualized osseous structures. There is a healed right clavicular fracture. Pt underwent Cath s/p Stents.

## 2019-01-29 NOTE — OCCUPATIONAL THERAPY INITIAL EVALUATION ADULT - LIVES WITH, PROFILE
Pt. reports he lives with "someone" in a house with 4 steps to enter. Once inside, pt. reports he has a full flight of steps to negotiate to reach 2nd floor where main bedroom and bathroom are located. Per pt., he has a bathtub in his bathroom. Pt. states he has a shower stall available in basement level of house.

## 2019-01-29 NOTE — PROGRESS NOTE ADULT - SUBJECTIVE AND OBJECTIVE BOX
Cardiology Attending Progress Note    s/p C with PCI-GUY to OM1  No current cardiac complaints  C/o back, cervical spine discomfort, headaches this AM  MRI spine pending    Vital Signs Last 24 Hrs  T(C): 36.8 (2019 04:56), Max: 36.8 (2019 04:56)  T(F): 98.3 (2019 04:56), Max: 98.3 (2019 04:56)  HR: 57 (2019 04:56) (57 - 63)  BP: 129/62 (2019 04:56) (129/62 - 142/96)  BP(mean): --  RR: 18 (2019 04:56) (16 - 18)  SpO2: 100% (2019 04:56) (100% - 100%)    NAD  No JVD, laying flat in bed  Reg S1S2, 2/6 SM  Decreased breath sounds bilateral bases  Soft obese abdomen, NT ND  Min edema b/l LE    MEDICATIONS  (STANDING):  aspirin enteric coated 81 milliGRAM(s) Oral daily  atorvastatin 80 milliGRAM(s) Oral at bedtime  clopidogrel Tablet      clopidogrel Tablet 75 milliGRAM(s) Oral daily  dextrose 50% Injectable 12.5 Gram(s) IV Push once  dextrose 50% Injectable 25 Gram(s) IV Push once  dextrose 50% Injectable 25 Gram(s) IV Push once  insulin lispro (HumaLOG) corrective regimen sliding scale   SubCutaneous three times a day before meals  insulin lispro (HumaLOG) corrective regimen sliding scale   SubCutaneous at bedtime  isosorbide   mononitrate ER Tablet (IMDUR) 30 milliGRAM(s) Oral daily  lisinopril 5 milliGRAM(s) Oral daily  metoprolol tartrate 25 milliGRAM(s) Oral two times a day    MEDICATIONS  (PRN):  acetaminophen   Tablet .. 650 milliGRAM(s) Oral every 6 hours PRN Mild Pain (1 - 3), Moderate Pain (4 - 6)  dextrose 40% Gel 15 Gram(s) Oral once PRN Blood Glucose LESS THAN 70 milliGRAM(s)/deciliter  glucagon  Injectable 1 milliGRAM(s) IntraMuscular once PRN Glucose LESS THAN 70 milligrams/deciliter                   12.9   8.91  )-----------( 282      ( 2019 06:15 )             38.7       137  |  104  |  14  ----------------------------<  119<H>  4.2   |  23  |  1.02    Ca    8.7      2019 06:15    TPro  7.0  /  Alb  3.9  /  TBili  0.4  /  DBili  x   /  AST  20  /  ALT  19  /  AlkPhos  73      PT/INR - ( 2019 17:49 )   PT: 13.0 SEC;   INR: 1.14     PTT - ( 2019 08:10 )  PTT:48.0 SEC               < from: Xray Chest 2 Views PA/Lat (19 @ 13:17) >    EXAM:  XR CHEST PA LAT 2V        PROCEDURE DATE:  2019         INTERPRETATION:  CLINICAL INFORMATION: Chest pain    EXAM: PA and lateral chest radiographs    COMPARISON: Chest radiograph from 2012      IMPRESSION:     Status post mediansternotomy. Lungs are clear.  Status post aortic valve replacement.  There is no acute abnormality of the visualized osseous structures. There   is a healed right clavicular fracture.        TORRI MIX M.D., RADIOLOGY RESIDENT  This document has been electronically signed.  NOEL HERMAN M.D. ATTENDING RADIOLOGIST  This document has been electronically signed. 2019  1:36PM            < from: Cardiac Cath Lab - Adult (19 @ 10:26) >    [place] [address]  [phone]  Cath Lab Report -- Comprehensive Report  Patient: IVANNA JETT  Study date: 2019  Account number: [id number]  MR number: [id number]  : 1950  Gender: Male  Race: W  Case Physician(s):  FIORDALIZA Mast M.D.  Fellow:  Neeru Mercado M.D.  Referring Physician:  Carlos Helms M.D.  INDICATIONS: Unstable angina - CCS4.  HISTORY: h/o CABG , LIMA to LAD, SVG to OM-1   RCA known to be small, nondominat and  No history of previous myocardial  infarction. The patient has hypertension and medication-treated  dyslipidemia.  PROCEDURE:  --  --  Left coronary angiography.  --  LIMA graft angiography.  --  Ascending aortography.  --  Diagnostic myocardial fractional flow reserve.  --  Sonosite - Diagnostic.  --  Sheath Exchange for Intervention.  --  Intervention on OM1: drug-eluting stent, balloon angioplasty.  TECHNIQUE: The risks and alternatives of the procedures and conscious  sedation were explained to the patient and informed consent was obtained.  Cardiac catheterization performed electively. Coronary intervention  performed electively.  Local anesthetic given. Left radial artery access. Local anesthetic given.  A 6 Fr. X 7cm Prelude Radial Kit was inserted in the vessel, utilizing the  modified Seldinger technique. Right femoral artery access. Local  anesthetic given. A 4fr Sheath [place] was inserted in the vessel,  utilizing the modified Seldinger technique. angiography. The vessel was  injected utilizing a catheter. Left coronary artery angiography. The  vessel was injected utilizing a 4 Fr. JL 3.5 catheter and positioned in  the vessel ostia under fluoroscopic guidance. Angiography was performed in  multiple projections using hand-injection of contrast. Left internal  mammary graft angiography. The vessel was injected utilizing a 4 Fr IM  catheter and positioned in the vessel ostia under fluoroscopic guidance.  Angiography was performed in multiple projections using hand-injection of  contrast. Ascending aortography. A 4 Fr Mod Pig Angled 5 Sideholes  catheter was placed and 30 ml contrast was injected. Imaging was performed  using an Icelandic projection. Myocardial (fractional) flow reserve in the  lesion in the mid circumflex. The vessel was entered with a 6 Fr. EBU 3.5  Launcher guiding catheter. Flow reserve was measured using a 0.014"  pressure-monitoring Verrata PLUS Wire J Tip guide-wire. Based on the  results of this study, the lesion was judged to be non-significant and no  intervention was performed. Sonosite - Diagnostic. RADIATION EXPOSURE:  34.5 min. A successful drug-eluting stent with balloon angioplasty was  performed on the 99 % lesion in the 1st obtuse marginal. Following  intervention there was an excellent angiographic appearance with a 1 %  residual stenosis. Vessel setup was performed. A 6 Fr. EBU 3.5 Launcher  guiding catheter was used to intubate the vessel. Vessel setup was  performed. A 190cm BMW UniversalII wire was used to cross the lesion.  Vessel setup was performed. A 190cm BMW Universal II wire was used to  cross the lesion. Vessel setup was performed. A [employer] Jim Blue 180cm wire  was used to cross the lesion. Vessel setup was performed. A [employer]  Extension wire was used to cross the lesion. Balloon angioplasty was  performed, using a 2.0 X 15 Sprinter OTW balloon, with 2 inflations and a  maximum inflation pressure of 12 polina. A 3.5 X 38 Resolute Norris  drug-eluting stent was placed across the lesion and deployed at a maximum  inflation pressure of 12 polina. Sheath Exchange for Intervention.  CONTRAST GIVEN: 180 ml Optiray. 75 ml Optiray.  MEDICATIONS GIVEN: Midazolam, 1 mg, IV. Fentanyl, 25 mcg, IV. Nicardipine  (Cardene), 200 mcg, intracoronary. Nitroglycerin, 100 mcg, intracoronary.  Heparin, 5000 units, IV. Heparin, 3000 units, IV. Heparin, 5000 units, IV.  2% Lidocaine, 3 ml, subcutaneously. Cardene, 400 mcg. 0.9% Normal saline,  250 ml, IV. 2% Lidocaine, 10 ml, subcutaneously. 0.9% Normal saline, 150  ml, IV.  VENTRICLES: No left ventriculogram was performed.  CORONARY VESSELS: The coronary circulation is left dominant.  LM:   --  LM: The left anterior descending and circumflex arteries arose  anomalously from separate ostia.  LAD:   --  LAD: The distal vessel was supplied by extensive retrograde flow  from the graft(s) to the mid LAD.  --  Proximal LAD: There was a tubular 80 % stenosis at a site with no prior  intervention. The lesion was eccentric. There was ANASTASIA grade 2 flow  through the vessel (partial perfusion).  CX:   --  Mid circumflex: There was a discrete 40 % stenosis at a site with  no prior intervention. The lesion was eccentric. There was ANASTASIA grade 3  flow through the vessel (brisk flow). iFR negative 0.98  --  OM1: There was a diffuse 99 % stenosis at a site with no prior  intervention. The lesion was eccentric. There was ANASTASIA grade 2 flow  through the vessel (partial perfusion) and a large vascular territory  distal to the lesion. This is a likelyculprit for the patient's clinical  presentation. An intervention was performed.  RCA:   --  RCA: This vessel was not injected.  GRAFTS:   --  Graft to the LAD: The graft was a medium sized LIMA. Distal  vessel angiography showed mild diffuse disease.  AORTA: Ascending aorta: Normal. The segment was moderately dilated.  COMPLICATIONS: There were no complications.  DIAGNOSTIC IMPRESSIONS: Successful PCI to severe stenosis of OM-1 using  3.5mm Norris GUY  Patent LIMA to LAD  DIAGNOSTIC RECOMMENDATIONS: DAPT x 12 months  Aggressive risk factor modification  INTERVENTIONAL IMPRESSIONS: Successful PCI to severe stenosis of OM-1 using  3.5mm Lobo GUY  Patent LIMA to LAD  INTERVENTIONAL RECOMMENDATIONS: DAPT x 12 months  Aggressive risk factor modification  Prepared and signed by  Sandee Corrales M.D.  Signed 2019 14:01:09    < end of copied text >

## 2019-01-29 NOTE — PROGRESS NOTE ADULT - ASSESSMENT
69 yo man with CAD, CABG x2, HTN, HLD, and DM2 (non-compliant w/ meds x 2  years) p/w chest pain at rest x 3 days, similar to one he had before CABG admitted to [facility] for Unstable Angina Pectoris.    s/p PCI-GUY to OM1  Hemodynamically stable, no cardiac complaints this AM    However, he is complaining of headaches and backaches/cervical area discomfort  Cervical spine MRI pending  If unremarkable, can d/c home    Can f/u in 2-4 weeks in my office.

## 2019-01-29 NOTE — OCCUPATIONAL THERAPY INITIAL EVALUATION ADULT - PATIENT/FAMILY/SIGNIFICANT OTHER GOALS STATEMENT, OT EVAL
Patient reports he feels at/close to baseline functional status and wants to return home post-hospital discharge.

## 2019-01-30 ENCOUNTER — INBOUND DOCUMENT (OUTPATIENT)
Age: 69
End: 2019-01-30

## 2019-01-30 ENCOUNTER — TRANSCRIPTION ENCOUNTER (OUTPATIENT)
Age: 69
End: 2019-01-30

## 2019-01-30 VITALS
RESPIRATION RATE: 17 BRPM | OXYGEN SATURATION: 100 % | HEART RATE: 61 BPM | SYSTOLIC BLOOD PRESSURE: 105 MMHG | TEMPERATURE: 98 F | DIASTOLIC BLOOD PRESSURE: 60 MMHG

## 2019-01-30 LAB
ANION GAP SERPL CALC-SCNC: 11 MMO/L — SIGNIFICANT CHANGE UP (ref 7–14)
BUN SERPL-MCNC: 14 MG/DL — SIGNIFICANT CHANGE UP (ref 7–23)
CALCIUM SERPL-MCNC: 8.5 MG/DL — SIGNIFICANT CHANGE UP (ref 8.4–10.5)
CHLORIDE SERPL-SCNC: 103 MMOL/L — SIGNIFICANT CHANGE UP (ref 98–107)
CO2 SERPL-SCNC: 23 MMOL/L — SIGNIFICANT CHANGE UP (ref 22–31)
CREAT SERPL-MCNC: 0.94 MG/DL — SIGNIFICANT CHANGE UP (ref 0.5–1.3)
GLUCOSE BLDC GLUCOMTR-MCNC: 109 MG/DL — HIGH (ref 70–99)
GLUCOSE BLDC GLUCOMTR-MCNC: 154 MG/DL — HIGH (ref 70–99)
GLUCOSE SERPL-MCNC: 122 MG/DL — HIGH (ref 70–99)
HCT VFR BLD CALC: 37.9 % — LOW (ref 39–50)
HGB BLD-MCNC: 13 G/DL — SIGNIFICANT CHANGE UP (ref 13–17)
MAGNESIUM SERPL-MCNC: 2 MG/DL — SIGNIFICANT CHANGE UP (ref 1.6–2.6)
MCHC RBC-ENTMCNC: 31.7 PG — SIGNIFICANT CHANGE UP (ref 27–34)
MCHC RBC-ENTMCNC: 34.3 % — SIGNIFICANT CHANGE UP (ref 32–36)
MCV RBC AUTO: 92.4 FL — SIGNIFICANT CHANGE UP (ref 80–100)
NRBC # FLD: 0 K/UL — LOW (ref 25–125)
PLATELET # BLD AUTO: 269 K/UL — SIGNIFICANT CHANGE UP (ref 150–400)
PMV BLD: 9.9 FL — SIGNIFICANT CHANGE UP (ref 7–13)
POTASSIUM SERPL-MCNC: 3.7 MMOL/L — SIGNIFICANT CHANGE UP (ref 3.5–5.3)
POTASSIUM SERPL-SCNC: 3.7 MMOL/L — SIGNIFICANT CHANGE UP (ref 3.5–5.3)
RBC # BLD: 4.1 M/UL — LOW (ref 4.2–5.8)
RBC # FLD: 12.8 % — SIGNIFICANT CHANGE UP (ref 10.3–14.5)
SODIUM SERPL-SCNC: 137 MMOL/L — SIGNIFICANT CHANGE UP (ref 135–145)
WBC # BLD: 8.18 K/UL — SIGNIFICANT CHANGE UP (ref 3.8–10.5)
WBC # FLD AUTO: 8.18 K/UL — SIGNIFICANT CHANGE UP (ref 3.8–10.5)

## 2019-01-30 PROCEDURE — 99239 HOSP IP/OBS DSCHRG MGMT >30: CPT

## 2019-01-30 RX ORDER — LISINOPRIL 2.5 MG/1
1 TABLET ORAL
Qty: 30 | Refills: 0
Start: 2019-01-30

## 2019-01-30 RX ORDER — ASPIRIN/CALCIUM CARB/MAGNESIUM 324 MG
1 TABLET ORAL
Qty: 0 | Refills: 0 | COMMUNITY
Start: 2019-01-30

## 2019-01-30 RX ORDER — ISOSORBIDE MONONITRATE 60 MG/1
1 TABLET, EXTENDED RELEASE ORAL
Qty: 0 | Refills: 0 | COMMUNITY
Start: 2019-01-30

## 2019-01-30 RX ORDER — ISOSORBIDE MONONITRATE 60 MG/1
1 TABLET, EXTENDED RELEASE ORAL
Qty: 30 | Refills: 0
Start: 2019-01-30 | End: 2019-02-28

## 2019-01-30 RX ORDER — METOPROLOL TARTRATE 50 MG
1 TABLET ORAL
Qty: 0 | Refills: 0 | COMMUNITY
Start: 2019-01-30

## 2019-01-30 RX ORDER — CLOPIDOGREL BISULFATE 75 MG/1
1 TABLET, FILM COATED ORAL
Qty: 90 | Refills: 0
Start: 2019-01-30 | End: 2019-04-29

## 2019-01-30 RX ORDER — ATORVASTATIN CALCIUM 80 MG/1
1 TABLET, FILM COATED ORAL
Qty: 30 | Refills: 0
Start: 2019-01-30 | End: 2019-02-28

## 2019-01-30 RX ORDER — LISINOPRIL 2.5 MG/1
1 TABLET ORAL
Qty: 0 | Refills: 0 | COMMUNITY
Start: 2019-01-30

## 2019-01-30 RX ORDER — ATORVASTATIN CALCIUM 80 MG/1
1 TABLET, FILM COATED ORAL
Qty: 0 | Refills: 0 | COMMUNITY
Start: 2019-01-30

## 2019-01-30 RX ORDER — METOPROLOL TARTRATE 50 MG
1 TABLET ORAL
Qty: 60 | Refills: 0
Start: 2019-01-30 | End: 2019-02-28

## 2019-01-30 RX ORDER — ASPIRIN/CALCIUM CARB/MAGNESIUM 324 MG
1 TABLET ORAL
Qty: 30 | Refills: 0
Start: 2019-01-30 | End: 2019-02-28

## 2019-01-30 RX ORDER — CLOPIDOGREL BISULFATE 75 MG/1
1 TABLET, FILM COATED ORAL
Qty: 0 | Refills: 0 | COMMUNITY
Start: 2019-01-30

## 2019-01-30 RX ADMIN — Medication 25 MILLIGRAM(S): at 05:15

## 2019-01-30 RX ADMIN — LISINOPRIL 5 MILLIGRAM(S): 2.5 TABLET ORAL at 05:15

## 2019-01-30 RX ADMIN — ISOSORBIDE MONONITRATE 30 MILLIGRAM(S): 60 TABLET, EXTENDED RELEASE ORAL at 12:10

## 2019-01-30 RX ADMIN — Medication 1: at 12:46

## 2019-01-30 RX ADMIN — Medication 81 MILLIGRAM(S): at 12:10

## 2019-01-30 RX ADMIN — CLOPIDOGREL BISULFATE 75 MILLIGRAM(S): 75 TABLET, FILM COATED ORAL at 12:10

## 2019-01-30 NOTE — DISCHARGE NOTE ADULT - PLAN OF CARE
To be asymptomatic, to reduce risks factors such as hypertension, diabetes and hyperlipidemia to lower the risk of blood clots formation; and to prevent complications of coronary artery disease such as worsening chest pain, heart attack and death. Continue aspirin and Plavix, do not stop unless instructed by your physician.  Continue low salt, fat, cholesterol and carbohydrate diet. Follow up with cardiologist and primary care physician's routine appointment. To maintain a normal blood glucose level and HgA1C level < 5.7 and to prevent diabetic complications such as uncontrolled diabetes, diabetic coma, blindness, renal failure, and amputations. Monitor finger sticks pre-meal and bedtime, low salt, fat and carbohydrate diet, minimize glucose intake.  Exercise daily for at least 30 minutes and weight loss.  Follow up with primary care physician and endocrinologist for routine Hemoglobin A1C checks and management.  Follow up with your ophthalmologist for routine yearly vision exams. To maintain a normal blood pressure to prevent heart attack, stroke and renal failure. Low sodium and fat diet, continue anti-hypertensive medications, and follow up with primary care physician.

## 2019-01-30 NOTE — DISCHARGE NOTE ADULT - CARE PROVIDER_API CALL
Carlos Helms (MD; PhD)  Cardiology; Internal Medicine; Vascular Medicine  [address] [place] [address], [facility] [address] [place] [address]  Phone: [phone]  Fax: [phone]

## 2019-01-30 NOTE — DISCHARGE NOTE ADULT - MEDICATION SUMMARY - MEDICATIONS TO TAKE
I will START or STAY ON the medications listed below when I get home from the hospital:    aspirin 81 mg oral delayed release tablet  -- 1 tab(s) by mouth once a day  -- Indication: For CAD (coronary artery disease)    lisinopril 5 mg oral tablet  -- 1 tab(s) by mouth once a day  -- Indication: For HTN    isosorbide mononitrate 30 mg oral tablet, extended release  -- 1 tab(s) by mouth once a day  -- Indication: For CAD (coronary artery disease)    atorvastatin 80 mg oral tablet  -- 1 tab(s) by mouth once a day (at bedtime)  -- Indication: For Cholesterol    clopidogrel 75 mg oral tablet  -- 1 tab(s) by mouth once a day  -- Indication: For CAD (coronary artery disease)    metoprolol tartrate 25 mg oral tablet  -- 1 tab(s) by mouth 2 times a day  -- Indication: For HTN

## 2019-01-30 NOTE — DISCHARGE NOTE ADULT - SECONDARY DIAGNOSIS.
S/P angioplasty with stent Diabetes mellitus Benign Essential Hypertension Radicular pain in right arm

## 2019-01-30 NOTE — DISCHARGE NOTE ADULT - HOSPITAL COURSE
67 yo male PMHx CAD, CABG x2, HTN, HLD, and DM2 (non-compliant w/ meds x 2  years) p/w chest pain at rest x 3 days, similar to one he had before CABG admitted to [facility] for Unstable Angina Pectoris.    +Unstable Angina Pectoris-->Plavix load, s/p Hep gtt, Statin, ASA.    1/28/19 Cardiac cath - OM 99%=> stent x1, LIMA LAD is patent. SVG OM1 is occluded. RFA and LRA access.  ASA + Plavix  +Right radicular pain--> Seen by Neurology: MRI C-spine 69 yo male PMHx CAD, CABG x2, HTN, HLD, and DM2 (non-compliant w/ meds x 2  years) p/w chest pain at rest x 3 days, similar to one he had before CABG admitted to [facility] for Unstable Angina Pectoris.    +Unstable Angina Pectoris-->Plavix load, s/p Hep gtt, Statin, ASA.    1/28/19 Cardiac cath - OM 99%=> stent x1, LIMA LAD is patent. SVG OM1 is occluded. RFA and LRA access.  ASA + Plavix  +Right radicular pain--> Seen by Neurology: MRI C-spine: Multiple Bulging discs from C2 to C7, Outpt follow up   Case discussed with attending, Pt is stable for discharge Home.

## 2019-01-30 NOTE — DISCHARGE NOTE ADULT - OTHER SIGNIFICANT FINDINGS
Loss of normal cervical lordosis is seen which could be due to   positioning or muscle spasm.    Disc desiccation is seen throughout cervical spine region though most   prominent at the C5-6 and C6-7 levels. These findings are secondary to   degenerative change    Modic type II degenerative change seen involving the endplates adjacent   to the C5-6 level and Modic type I degenerative change are seen involving   the endplates adjacent to the C6-7 level.    C2-3: Disc bulge and bilateral hypertrophic facet joint changes seen.   Mild to moderate narrowing of the right neural foramen is seen.    C3-4: Disc osteophyte complex is seen. Hypertrophic change is seen   involving the left uncovertebral joint. Hypertrophic change is seen   involving both facet joints. Mild narrowing of the spinal canal. Mild to   moderate narrowing of the right neural foramen and moderate to severe   narrowing of the left neural foramen.    C4-5: Disc osteophyte complex is seen. Bilateral hypertrophic facet joint   changes seen. Minimal effacement of the ventral thecal sac is seen which   is more on the prominent left side. Mild narrowing of the spinal canal is   seen. Moderate narrowing of both neural foramen    C5-6: Disc bulge seen. Hypertrophic change is seen involving both   uncovertebral and facet joints. Mild narrowing of the spinal canal.   Severe severe narrowing of both neural foramen    C6-7: Disc bulge and hypertrophic change seen involving both   uncovertebral and facet joints. Mild to moderate narrowing of the spinal   canal is seen. Moderate to severe narrowing of the left neural foramen   and severe narrowing of the right neural foramen.    C7-T1: Disc bulge is seen. No significant, as of the spinal canal or   either neural foramen    T1-2: Normal    The spinal cord demonstrates normal signal and caliber.

## 2019-01-30 NOTE — DISCHARGE NOTE ADULT - CARE PLAN
Principal Discharge DX:	CAD (coronary artery disease)  Goal:	To be asymptomatic, to reduce risks factors such as hypertension, diabetes and hyperlipidemia to lower the risk of blood clots formation; and to prevent complications of coronary artery disease such as worsening chest pain, heart attack and death.  Assessment and plan of treatment:	Continue aspirin and Plavix, do not stop unless instructed by your physician.  Continue low salt, fat, cholesterol and carbohydrate diet. Follow up with cardiologist and primary care physician's routine appointment.  Secondary Diagnosis:	S/P angioplasty with stent  Goal:	To be asymptomatic, to reduce risks factors such as hypertension, diabetes and hyperlipidemia to lower the risk of blood clots formation; and to prevent complications of coronary artery disease such as worsening chest pain, heart attack and death.  Assessment and plan of treatment:	Continue aspirin and Plavix, do not stop unless instructed by your physician.  Continue low salt, fat, cholesterol and carbohydrate diet. Follow up with cardiologist and primary care physician's routine appointment.  Secondary Diagnosis:	Diabetes mellitus  Goal:	To maintain a normal blood glucose level and HgA1C level < 5.7 and to prevent diabetic complications such as uncontrolled diabetes, diabetic coma, blindness, renal failure, and amputations.  Assessment and plan of treatment:	Monitor finger sticks pre-meal and bedtime, low salt, fat and carbohydrate diet, minimize glucose intake.  Exercise daily for at least 30 minutes and weight loss.  Follow up with primary care physician and endocrinologist for routine Hemoglobin A1C checks and management.  Follow up with your ophthalmologist for routine yearly vision exams.  Secondary Diagnosis:	Benign Essential Hypertension  Goal:	To maintain a normal blood pressure to prevent heart attack, stroke and renal failure.  Assessment and plan of treatment:	Low sodium and fat diet, continue anti-hypertensive medications, and follow up with primary care physician.  Secondary Diagnosis:	Radicular pain in right arm

## 2019-01-30 NOTE — DISCHARGE NOTE ADULT - PATIENT PORTAL LINK FT
You can access the [facility] Patient Portal, offered by Health system, by registering with the following website: [email or web address]

## 2019-02-28 ENCOUNTER — NON-APPOINTMENT (OUTPATIENT)
Age: 69
End: 2019-02-28

## 2019-02-28 ENCOUNTER — APPOINTMENT (OUTPATIENT)
Dept: CARDIOLOGY | Facility: CLINIC | Age: 69
End: 2019-02-28
Payer: COMMERCIAL

## 2019-02-28 VITALS
WEIGHT: 246 LBS | DIASTOLIC BLOOD PRESSURE: 85 MMHG | HEART RATE: 60 BPM | BODY MASS INDEX: 34.44 KG/M2 | HEIGHT: 71 IN | SYSTOLIC BLOOD PRESSURE: 134 MMHG | OXYGEN SATURATION: 97 %

## 2019-02-28 PROCEDURE — 93000 ELECTROCARDIOGRAM COMPLETE: CPT

## 2019-02-28 PROCEDURE — 99214 OFFICE O/P EST MOD 30 MIN: CPT

## 2019-02-28 RX ORDER — ISOSORBIDE MONONITRATE 30 MG
30 TABLET, EXTENDED RELEASE 24 HR ORAL DAILY
Refills: 0 | Status: ACTIVE | COMMUNITY

## 2019-02-28 RX ORDER — METOPROLOL TARTRATE 25 MG/1
25 TABLET, FILM COATED ORAL TWICE DAILY
Qty: 60 | Refills: 5 | Status: ACTIVE | COMMUNITY

## 2019-02-28 RX ORDER — CLOPIDOGREL BISULFATE 75 MG/1
75 TABLET, FILM COATED ORAL
Refills: 0 | Status: ACTIVE | COMMUNITY

## 2019-02-28 NOTE — PHYSICAL EXAM
[General Appearance - Well Developed] : well developed [Normal Appearance] : normal appearance [Well Groomed] : well groomed [General Appearance - Well Nourished] : well nourished [No Deformities] : no deformities [General Appearance - In No Acute Distress] : no acute distress [Normal Conjunctiva] : the conjunctiva exhibited no abnormalities [Eyelids - No Xanthelasma] : the eyelids demonstrated no xanthelasmas [Normal Oral Mucosa] : normal oral mucosa [No Oral Pallor] : no oral pallor [No Oral Cyanosis] : no oral cyanosis [Normal Jugular Venous A Waves Present] : normal jugular venous A waves present [Normal Jugular Venous V Waves Present] : normal jugular venous V waves present [No Jugular Venous Cardenas A Waves] : no jugular venous cardenas A waves [Respiration, Rhythm And Depth] : normal respiratory rhythm and effort [Exaggerated Use Of Accessory Muscles For Inspiration] : no accessory muscle use [Scattered Wheezes] : scattered wheezing [Decreased Breath Sounds] : decreased breath sounds [Bibasilar Rales/Crackles] : bibasilar rales [Heart Rate And Rhythm] : heart rate and rhythm were normal [Heart Sounds] : normal S1 and S2 [Systolic grade ___/6] : A grade [unfilled]/6 systolic murmur was heard. [Abdomen Soft] : soft [Abdomen Tenderness] : non-tender [Abdomen Mass (___ Cm)] : no abdominal mass palpated [Abnormal Walk] : normal gait [Gait - Sufficient For Exercise Testing] : the gait was sufficient for exercise testing [Nail Clubbing] : no clubbing of the fingernails [Cyanosis, Localized] : no localized cyanosis [Petechial Hemorrhages (___cm)] : no petechial hemorrhages [Skin Color & Pigmentation] : normal skin color and pigmentation [] : no rash [No Venous Stasis] : no venous stasis [Skin Lesions] : no skin lesions [No Skin Ulcers] : no skin ulcer [No Xanthoma] : no  xanthoma was observed [Oriented To Time, Place, And Person] : oriented to person, place, and time [Affect] : the affect was normal [Mood] : the mood was normal [No Anxiety] : not feeling anxious

## 2019-03-03 NOTE — REVIEW OF SYSTEMS
[Negative] : Heme/Lymph [Feeling Fatigued] : not feeling fatigued [Shortness Of Breath] : no shortness of breath [Dyspnea on exertion] : not dyspnea during exertion [Lower Ext Edema] : no extremity edema [Palpitations] : no palpitations [Cough] : no cough

## 2019-03-03 NOTE — REASON FOR VISIT
[Follow-Up - Clinic] : a clinic follow-up of [Coronary Artery Disease] : coronary artery disease [Heart Failure] : congestive heart failure [Hyperlipidemia] : hyperlipidemia [Medication Management] : Medication management [Prior Myocardial Infarction] : a prior myocardial infarction [FreeTextEntry1] : s/p AVR (t) - h/o bicuspid AV

## 2019-03-03 NOTE — HISTORY OF PRESENT ILLNESS
[FreeTextEntry1] : Jimbo Batres is returning to the office to follow-up - recent PCI at [facility]\par \par Now feels better\par No CP\par No LE edema\par BP stable\par compliant with meds\par

## 2019-03-03 NOTE — DISCUSSION/SUMMARY
[FreeTextEntry1] : Mr. Batres is a 67 y/o man with Hchol, prior tobacco use, bicuspid AV stenosis, NSTEMI, s/p 2V CABG and AVR(t)  \par \par s/p recent PCI to Lcx at [facility] for ACS (has not seen me for 4 years)\par Meds reviewed\par No changes\par Discussed compliance to Dapt\par f/u in 3-6 mo\par

## 2019-04-24 ENCOUNTER — APPOINTMENT (OUTPATIENT)
Dept: PAIN MANAGEMENT | Facility: CLINIC | Age: 69
End: 2019-04-24
Payer: COMMERCIAL

## 2019-04-24 VITALS
HEIGHT: 71 IN | WEIGHT: 262 LBS | BODY MASS INDEX: 36.68 KG/M2 | SYSTOLIC BLOOD PRESSURE: 160 MMHG | HEART RATE: 55 BPM | DIASTOLIC BLOOD PRESSURE: 80 MMHG

## 2019-04-24 DIAGNOSIS — M25.552 PAIN IN LEFT HIP: ICD-10-CM

## 2019-04-24 PROCEDURE — 99204 OFFICE O/P NEW MOD 45 MIN: CPT

## 2019-04-24 NOTE — PHYSICAL EXAM
[General Appearance - Alert] : alert [Oriented To Time, Place, And Person] : oriented to person, place, and time [General Appearance - In No Acute Distress] : in no acute distress [Place] : oriented to place [Person] : oriented to person [Affect] : the affect was normal [Cranial Nerves Oculomotor (III)] : extraocular motion intact [Cranial Nerves Optic (II)] : visual acuity intact bilaterally,  visual fields full to confrontation, pupils equal round and reactive to light [Time] : oriented to time [Cranial Nerves Vestibulocochlear (VIII)] : hearing was intact bilaterally [Cranial Nerves Facial (VII)] : face symmetrical [Cranial Nerves Trigeminal (V)] : facial sensation intact symmetrically [Involuntary Movements] : no involuntary movements were seen [Motor Strength] : muscle strength was normal in all four extremities [Motor Tone] : muscle tone was normal in all four extremities [Motor Handedness Right-Handed] : the patient is right hand dominant [Sensation Pain / Temperature Decrease] : pain and temperature was intact [Sensation Tactile Decrease] : light touch was intact [Sensation Vibration Decrease] : vibration was intact [Tactile Decrease Entire Leg Left] : diminished left leg [Tactile Decrease Entire Leg Right] : diminished right leg [Abnormal Walk] : normal gait [Balance] : balance was intact [2+] : Brachioradialis left 2+ [0] : Ankle jerk left 0 [1+] : Patella left 1+ [Sclera] : the sclera and conjunctiva were normal [PERRL With Normal Accommodation] : pupils were equal in size, round, reactive to light, with normal accommodation [Extraocular Movements] : extraocular movements were intact [Neck Appearance] : the appearance of the neck was normal [Outer Ear] : the ears and nose were normal in appearance [] : no respiratory distress [Skin Color & Pigmentation] : normal skin color and pigmentation [Motor Strength Upper Extremities Bilaterally] : strength was normal in both upper extremities [Motor Strength Lower Extremities Bilaterally] : strength was normal in both lower extremities [FreeTextEntry1] : swelling of LLE

## 2019-04-24 NOTE — DATA REVIEWED
[FreeTextEntry1] : Pt's I-STOP was reviewed and discussed during today's visit. Ref # [id number]\par \par MRI of the cervical spine- severe narrowing on the left and right side at C6-7
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
## 2019-04-24 NOTE — ASSESSMENT
[FreeTextEntry1] : 67 y/o male patient with chronic ride sided neck pain and shoulder pain as well as left groin pain presents today for an initial evaluation. His neurologic examination today is essentially nonfocal. Upon review of his medical record it is my opinion that moving from a couch to a bed would significantly improve his symptoms. It is also advised that moist heat be used to decrease the pain experienced as well. We also briefly discuss the use of medication and injections to treat his pain but I believe that they will not provide any significant relief until he changes his sleeping arrangements to be able to sleep on a regular bed. It is advised that magnesium 400 mg be initiated to help improve his sleep.He should first clear its use with his cardiologist before initiating. It is advised that he take it a significant amount of time before bed to receive the most benefit. During his physical exam there was a positive Jimbo's maneuver on the left and therefore I feel that it should be imaged to further diagnose the pain present. \par \par During his visit today: \par 1) Magnesium 400 mg is to be initiated. \par 2) An x-ray of the left hip is to be completed. \par 3) He is to follow up in 4-6 weeks or sooner if clinically indicated.

## 2019-04-24 NOTE — END OF VISIT
[>50% of Time Spent on Counseling for ____] : Greater than 50% of the encounter time was spent on counseling for [unfilled] [Time Spent: ___ minutes] : I have spent [unfilled] minutes of face to face time with the patient [FreeTextEntry3] : This note was authored by Sandip Topete working as a medical scribe for Dr. Vasquez Cordero. The note was reviewed, edited, and revised by Dr. Vasquez Cordero who is in agreement with the exam findings, and treatment plan. (4/24/19)

## 2019-05-22 ENCOUNTER — APPOINTMENT (OUTPATIENT)
Dept: PAIN MANAGEMENT | Facility: CLINIC | Age: 69
End: 2019-05-22

## 2019-09-05 ENCOUNTER — APPOINTMENT (OUTPATIENT)
Dept: CARDIOLOGY | Facility: CLINIC | Age: 69
End: 2019-09-05

## 2019-09-09 ENCOUNTER — INPATIENT (INPATIENT)
Facility: HOSPITAL | Age: 69
LOS: 2 days | Discharge: HOME | DRG: 440 | End: 2019-09-12
Attending: HOSPITALIST | Admitting: STUDENT IN AN ORGANIZED HEALTH CARE EDUCATION/TRAINING PROGRAM
Payer: MEDICARE

## 2019-09-09 VITALS
OXYGEN SATURATION: 97 % | TEMPERATURE: 98 F | SYSTOLIC BLOOD PRESSURE: 184 MMHG | RESPIRATION RATE: 16 BRPM | WEIGHT: 255.96 LBS | HEIGHT: 71 IN | HEART RATE: 76 BPM | DIASTOLIC BLOOD PRESSURE: 103 MMHG

## 2019-09-09 DIAGNOSIS — R10.11 RIGHT UPPER QUADRANT PAIN: ICD-10-CM

## 2019-09-09 DIAGNOSIS — R06.02 SHORTNESS OF BREATH: ICD-10-CM

## 2019-09-09 DIAGNOSIS — E11.9 TYPE 2 DIABETES MELLITUS WITHOUT COMPLICATIONS: ICD-10-CM

## 2019-09-09 DIAGNOSIS — Z95.1 PRESENCE OF AORTOCORONARY BYPASS GRAFT: Chronic | ICD-10-CM

## 2019-09-09 DIAGNOSIS — I10 ESSENTIAL (PRIMARY) HYPERTENSION: ICD-10-CM

## 2019-09-09 DIAGNOSIS — I25.10 ATHEROSCLEROTIC HEART DISEASE OF NATIVE CORONARY ARTERY WITHOUT ANGINA PECTORIS: ICD-10-CM

## 2019-09-09 DIAGNOSIS — Z29.9 ENCOUNTER FOR PROPHYLACTIC MEASURES, UNSPECIFIED: ICD-10-CM

## 2019-09-09 DIAGNOSIS — Z95.4 PRESENCE OF OTHER HEART-VALVE REPLACEMENT: Chronic | ICD-10-CM

## 2019-09-09 DIAGNOSIS — R10.9 UNSPECIFIED ABDOMINAL PAIN: ICD-10-CM

## 2019-09-09 DIAGNOSIS — K57.92 DIVERTICULITIS OF INTESTINE, PART UNSPECIFIED, WITHOUT PERFORATION OR ABSCESS WITHOUT BLEEDING: ICD-10-CM

## 2019-09-09 DIAGNOSIS — E78.5 HYPERLIPIDEMIA, UNSPECIFIED: ICD-10-CM

## 2019-09-09 LAB
ALBUMIN SERPL ELPH-MCNC: 3.7 G/DL — SIGNIFICANT CHANGE UP (ref 3.3–5)
ALP SERPL-CCNC: 95 U/L — SIGNIFICANT CHANGE UP (ref 40–120)
ALT FLD-CCNC: 28 U/L — SIGNIFICANT CHANGE UP (ref 12–78)
ANION GAP SERPL CALC-SCNC: 6 MMOL/L — SIGNIFICANT CHANGE UP (ref 5–17)
AST SERPL-CCNC: 15 U/L — SIGNIFICANT CHANGE UP (ref 15–37)
BASOPHILS # BLD AUTO: 0.05 K/UL — SIGNIFICANT CHANGE UP (ref 0–0.2)
BASOPHILS NFR BLD AUTO: 0.5 % — SIGNIFICANT CHANGE UP (ref 0–2)
BILIRUB SERPL-MCNC: 0.4 MG/DL — SIGNIFICANT CHANGE UP (ref 0.2–1.2)
BUN SERPL-MCNC: 14 MG/DL — SIGNIFICANT CHANGE UP (ref 7–23)
CALCIUM SERPL-MCNC: 9 MG/DL — SIGNIFICANT CHANGE UP (ref 8.5–10.1)
CHLORIDE SERPL-SCNC: 108 MMOL/L — SIGNIFICANT CHANGE UP (ref 96–108)
CO2 SERPL-SCNC: 27 MMOL/L — SIGNIFICANT CHANGE UP (ref 22–31)
CREAT SERPL-MCNC: 1.2 MG/DL — SIGNIFICANT CHANGE UP (ref 0.5–1.3)
EOSINOPHIL # BLD AUTO: 0.21 K/UL — SIGNIFICANT CHANGE UP (ref 0–0.5)
EOSINOPHIL NFR BLD AUTO: 2.1 % — SIGNIFICANT CHANGE UP (ref 0–6)
GLUCOSE SERPL-MCNC: 215 MG/DL — HIGH (ref 70–99)
HCT VFR BLD CALC: 47.6 % — SIGNIFICANT CHANGE UP (ref 39–50)
HGB BLD-MCNC: 15.9 G/DL — SIGNIFICANT CHANGE UP (ref 13–17)
IMM GRANULOCYTES NFR BLD AUTO: 0.3 % — SIGNIFICANT CHANGE UP (ref 0–1.5)
LIDOCAIN IGE QN: 255 U/L — SIGNIFICANT CHANGE UP (ref 73–393)
LYMPHOCYTES # BLD AUTO: 2.18 K/UL — SIGNIFICANT CHANGE UP (ref 1–3.3)
LYMPHOCYTES # BLD AUTO: 22.3 % — SIGNIFICANT CHANGE UP (ref 13–44)
MCHC RBC-ENTMCNC: 30.2 PG — SIGNIFICANT CHANGE UP (ref 27–34)
MCHC RBC-ENTMCNC: 33.4 GM/DL — SIGNIFICANT CHANGE UP (ref 32–36)
MCV RBC AUTO: 90.3 FL — SIGNIFICANT CHANGE UP (ref 80–100)
MONOCYTES # BLD AUTO: 1.01 K/UL — HIGH (ref 0–0.9)
MONOCYTES NFR BLD AUTO: 10.3 % — SIGNIFICANT CHANGE UP (ref 2–14)
NEUTROPHILS # BLD AUTO: 6.31 K/UL — SIGNIFICANT CHANGE UP (ref 1.8–7.4)
NEUTROPHILS NFR BLD AUTO: 64.5 % — SIGNIFICANT CHANGE UP (ref 43–77)
NRBC # BLD: 0 /100 WBCS — SIGNIFICANT CHANGE UP (ref 0–0)
PLATELET # BLD AUTO: 347 K/UL — SIGNIFICANT CHANGE UP (ref 150–400)
POTASSIUM SERPL-MCNC: 4.1 MMOL/L — SIGNIFICANT CHANGE UP (ref 3.5–5.3)
POTASSIUM SERPL-SCNC: 4.1 MMOL/L — SIGNIFICANT CHANGE UP (ref 3.5–5.3)
PROT SERPL-MCNC: 8.4 G/DL — HIGH (ref 6–8.3)
RBC # BLD: 5.27 M/UL — SIGNIFICANT CHANGE UP (ref 4.2–5.8)
RBC # FLD: 13.4 % — SIGNIFICANT CHANGE UP (ref 10.3–14.5)
SODIUM SERPL-SCNC: 141 MMOL/L — SIGNIFICANT CHANGE UP (ref 135–145)
TROPONIN I SERPL-MCNC: 0.02 NG/ML — SIGNIFICANT CHANGE UP (ref 0.01–0.04)
TROPONIN I SERPL-MCNC: <.015 NG/ML — SIGNIFICANT CHANGE UP (ref 0.01–0.04)
WBC # BLD: 9.79 K/UL — SIGNIFICANT CHANGE UP (ref 3.8–10.5)
WBC # FLD AUTO: 9.79 K/UL — SIGNIFICANT CHANGE UP (ref 3.8–10.5)

## 2019-09-09 PROCEDURE — 71045 X-RAY EXAM CHEST 1 VIEW: CPT | Mod: 26

## 2019-09-09 PROCEDURE — 93010 ELECTROCARDIOGRAM REPORT: CPT

## 2019-09-09 PROCEDURE — 74174 CTA ABD&PLVS W/CONTRAST: CPT | Mod: 26

## 2019-09-09 PROCEDURE — 76705 ECHO EXAM OF ABDOMEN: CPT | Mod: 26

## 2019-09-09 PROCEDURE — 71275 CT ANGIOGRAPHY CHEST: CPT | Mod: 26

## 2019-09-09 PROCEDURE — 99223 1ST HOSP IP/OBS HIGH 75: CPT | Mod: GC,AI

## 2019-09-09 PROCEDURE — 78226 HEPATOBILIARY SYSTEM IMAGING: CPT | Mod: 26

## 2019-09-09 PROCEDURE — 99285 EMERGENCY DEPT VISIT HI MDM: CPT

## 2019-09-09 RX ORDER — DEXTROSE 50 % IN WATER 50 %
15 SYRINGE (ML) INTRAVENOUS ONCE
Refills: 0 | Status: DISCONTINUED | OUTPATIENT
Start: 2019-09-09 | End: 2019-09-12

## 2019-09-09 RX ORDER — ATORVASTATIN CALCIUM 80 MG/1
80 TABLET, FILM COATED ORAL AT BEDTIME
Refills: 0 | Status: DISCONTINUED | OUTPATIENT
Start: 2019-09-09 | End: 2019-09-12

## 2019-09-09 RX ORDER — MORPHINE SULFATE 50 MG/1
2 CAPSULE, EXTENDED RELEASE ORAL EVERY 4 HOURS
Refills: 0 | Status: DISCONTINUED | OUTPATIENT
Start: 2019-09-09 | End: 2019-09-12

## 2019-09-09 RX ORDER — INSULIN LISPRO 100/ML
VIAL (ML) SUBCUTANEOUS
Refills: 0 | Status: DISCONTINUED | OUTPATIENT
Start: 2019-09-09 | End: 2019-09-12

## 2019-09-09 RX ORDER — LISINOPRIL 2.5 MG/1
5 TABLET ORAL DAILY
Refills: 0 | Status: DISCONTINUED | OUTPATIENT
Start: 2019-09-09 | End: 2019-09-12

## 2019-09-09 RX ORDER — CLOPIDOGREL BISULFATE 75 MG/1
75 TABLET, FILM COATED ORAL DAILY
Refills: 0 | Status: DISCONTINUED | OUTPATIENT
Start: 2019-09-09 | End: 2019-09-12

## 2019-09-09 RX ORDER — DEXTROSE 50 % IN WATER 50 %
25 SYRINGE (ML) INTRAVENOUS ONCE
Refills: 0 | Status: DISCONTINUED | OUTPATIENT
Start: 2019-09-09 | End: 2019-09-12

## 2019-09-09 RX ORDER — SODIUM CHLORIDE 9 MG/ML
1000 INJECTION INTRAMUSCULAR; INTRAVENOUS; SUBCUTANEOUS
Refills: 0 | Status: DISCONTINUED | OUTPATIENT
Start: 2019-09-09 | End: 2019-09-12

## 2019-09-09 RX ORDER — ASPIRIN/CALCIUM CARB/MAGNESIUM 324 MG
81 TABLET ORAL DAILY
Refills: 0 | Status: DISCONTINUED | OUTPATIENT
Start: 2019-09-09 | End: 2019-09-12

## 2019-09-09 RX ORDER — DEXTROSE 50 % IN WATER 50 %
12.5 SYRINGE (ML) INTRAVENOUS ONCE
Refills: 0 | Status: DISCONTINUED | OUTPATIENT
Start: 2019-09-09 | End: 2019-09-12

## 2019-09-09 RX ORDER — SODIUM CHLORIDE 9 MG/ML
1000 INJECTION, SOLUTION INTRAVENOUS
Refills: 0 | Status: DISCONTINUED | OUTPATIENT
Start: 2019-09-09 | End: 2019-09-12

## 2019-09-09 RX ORDER — ISOSORBIDE DINITRATE 5 MG/1
30 TABLET ORAL
Refills: 0 | Status: DISCONTINUED | OUTPATIENT
Start: 2019-09-09 | End: 2019-09-09

## 2019-09-09 RX ORDER — GLUCAGON INJECTION, SOLUTION 0.5 MG/.1ML
1 INJECTION, SOLUTION SUBCUTANEOUS ONCE
Refills: 0 | Status: DISCONTINUED | OUTPATIENT
Start: 2019-09-09 | End: 2019-09-12

## 2019-09-09 RX ORDER — LABETALOL HCL 100 MG
10 TABLET ORAL ONCE
Refills: 0 | Status: COMPLETED | OUTPATIENT
Start: 2019-09-09 | End: 2019-09-09

## 2019-09-09 RX ORDER — ISOSORBIDE DINITRATE 5 MG/1
30 TABLET ORAL DAILY
Refills: 0 | Status: DISCONTINUED | OUTPATIENT
Start: 2019-09-09 | End: 2019-09-10

## 2019-09-09 RX ORDER — METOPROLOL TARTRATE 50 MG
25 TABLET ORAL
Refills: 0 | Status: DISCONTINUED | OUTPATIENT
Start: 2019-09-09 | End: 2019-09-12

## 2019-09-09 RX ORDER — ACETAMINOPHEN 500 MG
650 TABLET ORAL EVERY 4 HOURS
Refills: 0 | Status: DISCONTINUED | OUTPATIENT
Start: 2019-09-09 | End: 2019-09-12

## 2019-09-09 RX ORDER — METOPROLOL TARTRATE 50 MG
25 TABLET ORAL ONCE
Refills: 0 | Status: COMPLETED | OUTPATIENT
Start: 2019-09-09 | End: 2019-09-09

## 2019-09-09 RX ORDER — MORPHINE SULFATE 50 MG/1
4 CAPSULE, EXTENDED RELEASE ORAL EVERY 4 HOURS
Refills: 0 | Status: DISCONTINUED | OUTPATIENT
Start: 2019-09-09 | End: 2019-09-12

## 2019-09-09 RX ADMIN — Medication 10 MILLIGRAM(S): at 17:30

## 2019-09-09 RX ADMIN — SODIUM CHLORIDE 50 MILLILITER(S): 9 INJECTION INTRAMUSCULAR; INTRAVENOUS; SUBCUTANEOUS at 22:45

## 2019-09-09 RX ADMIN — Medication 25 MILLIGRAM(S): at 19:50

## 2019-09-09 NOTE — H&P ADULT - PROBLEM SELECTOR PLAN 1
Acute, could be 2/2 early pancreatitis given CT angio findings of questionable minimal edema of the pancreatic head  -Initial lipase WNL, will repeat    -Will keep NPO overnight, gentle fluids @ 75cc/hr   -Pain regimen   -HIDA negative for cholecystitis, aortic aneurysm & aortic dissection ruled out   -Cardiology consulted, recs appreciated. Does not feel the pain is cardiac in nature, trops negative x2, no evidence for an acute coronary syndrome  -Surgery consulted, recs appreciated Acute, could be 2/2 early pancreatitis given CT angio findings of questionable minimal edema of the pancreatic head  -Initial lipase WNL, will repeat in AM, f/u results   -Will keep NPO overnight, gentle fluids @ 75cc/hr   -Pain regimen   -HIDA negative for cholecystitis, aortic aneurysm & aortic dissection ruled out   -Cardiology consulted, recs appreciated. Does not feel the pain is cardiac in nature, trops negative x2, no evidence for an acute coronary syndrome  -Surgery consulted, recs appreciated Acute, could be 2/2 early pancreatitis given CT angio findings of questionable minimal edema of the pancreatic head  -Initial lipase WNL, will repeat in AM, f/u results   -F/u AM labs   -Will keep NPO overnight, gentle fluids @ 75cc/hr   -Pain regimen   -HIDA negative for cholecystitis, aortic aneurysm & aortic dissection ruled out   -Cardiology consulted, recs appreciated. Does not feel the pain is cardiac in nature, trops negative x2, no evidence for an acute coronary syndrome  -Surgery consulted, recs appreciated Acute,  significant pain on exam.  could be 2/2 early pancreatitis given CT angio findings of questionable minimal edema of the pancreatic head  -Initial lipase WNL, will repeat in AM, f/u results   -F/u AM labs   -Will keep NPO overnight, gentle fluids @ 75cc/hr   -Pain regimen   -HIDA negative for cholecystitis, aortic aneurysm & aortic dissection ruled out   -Cardiology consulted, recs appreciated. Does not feel the pain is cardiac in nature, trops negative x2, no evidence for an acute coronary syndrome  -Surgery consulted, recs appreciated  - GI consult- Cas

## 2019-09-09 NOTE — ED PROVIDER NOTE - PROGRESS NOTE DETAILS
CTa neg for dissection, clear from cards standpoint, still with ruq pain and tenderness though does not want any medications, given ct with sludge and gbw thickening, will get surg consult hida negative, lipase wnl, pt still with pain but declining any pain meds, will rpt trop if neg will dc. BP improving, will give home meds seen by surg, recommend admission for serial exams, Gi in am, rpt lipase toensure not developing pancreatitis

## 2019-09-09 NOTE — H&P ADULT - NSHPREVIEWOFSYSTEMS_GEN_ALL_CORE
Constitutional: Denies fever, chills, general malaise, diaphoresis   HEENT: Denies visual disturbances   Respiratory: Admits to SOB, pain with inspiration. Denies MICHELE, cough, wheezing  Cardiovascular: Denies chest pain, palpitations, peripheral edema  Gastrointestinal: Admits to RUQ abdominal pain radiating to the back. Denies nausea, vomiting, diarrhea, constipation  Genitourinary: Admits to frequency. Denies dysuria, urgency, incontinence, hematuria    Neurologic: Denies headache, weakness, dizziness, numbness/tingling      ROS negative except as noted above

## 2019-09-09 NOTE — CONSULT NOTE ADULT - ASSESSMENT
69 yo with abdominal pain, HIDA negative for cholecystitis     medical admission     GI consult      will follow
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
## 2019-09-09 NOTE — ED ADULT TRIAGE NOTE - CHIEF COMPLAINT QUOTE
pt c/o chest pain x 2 days with diff breathing, ems placed # 20 right ac, aspirin 324 mg po, nitro 0.4 mg sl, pt had some relief

## 2019-09-09 NOTE — ED PROVIDER NOTE - NSFOLLOWUPINSTRUCTIONS_ED_ALL_ED_FT
Please follow up with your primary care doctor and with your cardiologist in 1-2days  Return to ED for worsening abdominal pain, vomiting, fevers, chills, or other concerns     Abdominal Pain    Many things can cause abdominal pain. Many times, abdominal pain is not caused by a disease and will improve without treatment. Your health care provider will do a physical exam to determine if there is a dangerous cause of your pain; blood tests and imaging may help determine the cause of your pain. However, in many cases, no cause may be found and you may need further testing as an outpatient. Monitor your abdominal pain for any changes.     SEEK IMMEDIATE MEDICAL CARE IF YOU HAVE ANY OF THE FOLLOWING SYMPTOMS: worsening abdominal pain, uncontrollable vomiting, profuse diarrhea, inability to have bowel movements or pass gas, black or bloody stools, fever accompanying chest pain or back pain, or fainting. These symptoms may represent a serious problem that is an emergency. Do not wait to see if the symptoms will go away. Get medical help right away. Call 911 and do not drive yourself to the hospital.    Shortness of Breath    WHAT YOU NEED TO KNOW:    Shortness of breath is a feeling that you cannot get enough air when you breathe in. You may have this feeling only during activity, or all the time. Your symptoms can range from mild to severe. Shortness of breath may be a sign of a serious health condition that needs immediate care.    DISCHARGE INSTRUCTIONS:    Return to the emergency department if:     Your signs and symptoms are the same or worse within 24 hours of treatment.       The skin over your ribs or on your neck sinks in when you breathe.       You feel confused or dizzy.    Contact your healthcare provider if:     You have new or worsening symptoms.      You have questions or concerns about your condition or care.    Medicines:     Medicines may be used to treat the cause of your symptoms. You may need medicine to treat a bacterial infection or reduce anxiety. Other medicines may be used to open your airway, reduce swelling, or remove extra fluid. If you have a heart condition, you may need medicine to help your heart beat more strongly or regularly.      Take your medicine as directed. Contact your healthcare provider if you think your medicine is not helping or if you have side effects. Tell him or her if you are allergic to any medicine. Keep a list of the medicines, vitamins, and herbs you take. Include the amounts, and when and why you take them. Bring the list or the pill bottles to follow-up visits. Carry your medicine list with you in case of an emergency.    Manage shortness of breath:     Create an action plan. You and your healthcare provider can work together to create a plan for how to handle shortness of breath. The plan can include daily activities, treatment changes, and what to do if you have severe breathing problems.      Lean forward on your elbows when you sit. This helps your lungs expand and may make it easier to breathe.      Use pursed-lip breathing any time you feel short of breath. Breathe in through your nose and then slowly breathe out through your mouth with your lips slightly puckered. It should take you twice as long to breathe out as it did to breathe in.Breathe in Breathe out           Do not smoke. Nicotine and other chemicals in cigarettes and cigars can cause lung damage and make shortness of breath worse. Ask your healthcare provider for information if you currently smoke and need help to quit. E-cigarettes or smokeless tobacco still contain nicotine. Talk to your healthcare provider before you use these products.      Reach or maintain a healthy weight. Your healthcare provider can help you create a safe weight loss plan if you are overweight.      Exercise as directed. Exercise can help your lungs work more easily. Exercise can also help you lose weight if needed. Try to get at least 30 minutes of exercise most days of the week.    Follow up with your healthcare provider or specialist as directed: Write down your questions so you remember to ask them during your visits.

## 2019-09-09 NOTE — H&P ADULT - NSICDXPASTMEDICALHX_GEN_ALL_CORE_FT
PAST MEDICAL HISTORY:  Benign Essential Hypertension     Diabetes mellitus     Diverticulitis     Finger amputation, traumatic Right UE 4th and 5th digits    H/O heart bypass surgery CABG x2    Myocardial infarct     Other and Unspecified Hyperlipidemia     Scarlet Fever     Systolic Murmur     Valvular disease Valve replacement

## 2019-09-09 NOTE — H&P ADULT - NSHPSOCIALHISTORY_GEN_ALL_CORE
Lives at home with sister. Works as a . Never smoker, denies EtOH use. Lives at home with sister. Works as a . Never smoker, denies EtOH use.  ambulates independently  no flu shot this year

## 2019-09-09 NOTE — H&P ADULT - PROBLEM SELECTOR PLAN 3
Chronic, stable   -S/p CABG x2 (8913-3481), s/p stent in Jan 2019 Chronic, stable   -S/p CABG x2 ([id number]), s/p stent in Jan 2019  -Continue home ASA 81mg PO qd  -Continue home Plavix 75mg PO qd Chronic, stable   -S/p CABG x2 (8411-6637), s/p stent in Jan 2019  -Continue home ASA 81mg PO qd  -Continue home Plavix 75mg PO qd  -Continue home isosorbide dinitrate 30mg PO qd

## 2019-09-09 NOTE — H&P ADULT - PROBLEM SELECTOR PLAN 6
Chronic, stable Chronic, on home metformin unknown dose will need to confirm in am  start insulin sliding scale , hypoglycemia protocol

## 2019-09-09 NOTE — CONSULT NOTE ADULT - ASSESSMENT
Jimbo's symptoms appear more gastrointestinal in nature located in the right side of the abdomen. He has no evidence for an acute coronary syndrome and persistent symptoms are different than those he experienced with prior coronary syndromes. He reports that he is taking his medications.    Recommend    Serial cardiac enzymes    EKG repeated    Await final reports of imaging    Consider surgical/gastroenterology evaluation    Further management can be dependent on his clinical course

## 2019-09-09 NOTE — H&P ADULT - NSICDXPASTSURGICALHX_GEN_ALL_CORE_FT
PAST SURGICAL HISTORY:  History of heart valve replacement     S/P CABG (coronary artery bypass graft)     Traumatic Amputation of Finger(s)

## 2019-09-09 NOTE — ED PROVIDER NOTE - OBJECTIVE STATEMENT
69yo M with htn, DM, cad, cabg p/w ruq pain since yesterday afternoon. prior to pain starting was nauseous then had constant ruq abd pain since yesterday radiating to back. no chest pain. + sob with pain. no fevers, noc hills, no cough  received nitro and asa by ems with some resolution of symptoms. no worsening factors   does not feel similar to anginal pain. had cath with new stent placed this year in January.   cards- dr arce

## 2019-09-09 NOTE — ED ADULT NURSE NOTE - OBJECTIVE STATEMENT
pt to er states he has upper ab pain with sob today upon arrival is awake and alert oriented x 4 resp even unlabored placed on cardiac monitor denies chest pain states pain mostly in upper left ab denies n/v

## 2019-09-09 NOTE — CONSULT NOTE ADULT - SUBJECTIVE AND OBJECTIVE BOX
[facility] Consultation    CHIEF COMPLAINT: Patient is a 68y old  Male who presents with a chief complaint of abd pain    HPI:  Patient came to the emergency room with epigastric/abdominal discomfort. Since yesterday, he has noted right-sided abdominal discomfort which is worse with pressing on it. This is continuous and different than pain he has had with prior myocardial infarction another acute coronary syndrome. He reports that dyspnea, diaphoresis, palpitations, lightheadedness, or syncope    Past medical history is remarkable for bicuspid aortic valve status post tissue aortic valve replacement, coronary artery disease, myocardial infarction, heart failure, coronary artery bypass of 2 vessels, hypertension, hyperlipidemia. He last underwent percutaneous intervention to the left circumflex artery and LAD for an acute coronary syndrome.    PAST MEDICAL & SURGICAL HISTORY:  Myocardial infarct  H/O heart bypass surgery  Valvular disease  Myocardial infarct  Diabetes mellitus  Scarlet Fever  Systolic Murmur  Other and Unspecified Hyperlipidemia  Benign Essential Hypertension  History of heart valve replacement  S/P CABG (coronary artery bypass graft)  Traumatic Amputation of Finger(s)      SOCIAL HISTORY: former tobacco    FAMILY HISTORY: FAMILY HISTORY:  No pertinent family history in first degree relatives      MEDICATIONS  (STANDING):Atorvastatin, clopidogrel, aspirin, isosorbide mononitrate, lisinopril, metoprolol      Allergies    No Known Allergies          REVIEW OF SYSTEMS:    CONSTITUTIONAL: No weakness, fevers or chills  EYES: No visual changes, No diplopia  ENMT: No throat pain , No exudate  NECK: No pain or stiffness  RESPIRATORY: No cough, wheezing, hemoptysis; No shortness of breath  CARDIOVASCULAR: No chest pain or palpitations  GASTROINTESTINAL:  abdominal pain as above, pos nausea, no vomiting, or hematemesis; No diarrhea or constipation. No melena or hematochezia.  GENITOURINARY: No dysuria, frequency or hematuria  NEUROLOGICAL: No numbness or weakness  SKIN: No itching or rash  All other review of systems is negative unless indicated above    VITAL SIGNS:   Vital Signs Last 24 Hrs  T(C): 36.7 (09 Sep 2019 13:42), Max: 36.7 (09 Sep 2019 13:42)  T(F): 98.1 (09 Sep 2019 13:42), Max: 98.1 (09 Sep 2019 13:42)  HR: 65 (09 Sep 2019 17:01) (65 - 76)  BP: 200/93 (09 Sep 2019 17:01) (184/103 - 200/93)  BP(mean): --  RR: 16 (09 Sep 2019 17:01) (16 - 16)  SpO2: 99% (09 Sep 2019 17:01) (97% - 99%)    I&O's Summary      PHYSICAL EXAM:    Constitutional: NAD, awake and alert, well-developed  Eyes:  EOMI,  Pupils round, no lesions  ENMT: no exudate or erythema  Pulmonary: Non-labored, breath sounds are clear bilaterally, No wheezing, rales or rhonchi  Cardiovascular: PMI not palpable Regular S1 and S2, no murmurs, rubs, gallops or clicks  Gastrointestinal: Bowel Sounds present, soft, RLQ tender to palpation   Lymph: No peripheral edema. No cervical lymphadenopathy.  Neurological: Alert, no focal deficits  Skin: No rashes. Changes of chronic venous stasis. No cyanosis.  Psych:  Mood & affect appropriate    LABS: All Labs Reviewed:                        15.9   9.79  )-----------( 347      ( 09 Sep 2019 14:21 )             47.6     09 Sep 2019 14:21    141    |  108    |  14     ----------------------------<  215    4.1     |  27     |  1.20     Ca    9.0        09 Sep 2019 14:21    TPro  8.4    /  Alb  3.7    /  TBili  0.4    /  DBili  x      /  AST  15     /  ALT  28     /  AlkPhos  95     09 Sep 2019 14:21      CARDIAC MARKERS ( 09 Sep 2019 14:21 )  <.015 ng/mL / x     / x     / x     / x        ECG: SR, non spec ST/T abn

## 2019-09-09 NOTE — H&P ADULT - PROBLEM SELECTOR PLAN 2
Acute, 2/2 pain with inspiration Acute, 2/2 pain with inspiration  -Continue with pain regimen Acute, likely 2/2 pain   -Continue with pain regimen  - sating well on RA

## 2019-09-09 NOTE — H&P ADULT - PROBLEM SELECTOR PLAN 4
Chronic   - Chronic   -Continue home atorvastatin Chronic   -Continue home atorvastatin 80 mg PO qhs  -Follow up AM lipid profile

## 2019-09-09 NOTE — ED ADULT NURSE NOTE - NS ED PATIENT SAFETY CONCERN
Vaccine Information Statement(s) was given today. This has been reviewed, questions answered, and verbal consent given by Patient for injection(s) and administration of Influenza (Inactivated).    1. Does the patient have a moderate to severe fever?  No  2. Has the patient had a serious reaction to a flu shot before?   No  3. Has the patient ever had [place] Syndrome within 6 weeks of a previous flu shot?  No  4. Is the patient less that 6 months of age?  No    Patient is eligible to receive the vaccine based on all questions being answered as 'No'.    Patient tolerated without incident. See immunization grid for documentation.     No

## 2019-09-09 NOTE — H&P ADULT - ASSESSMENT
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 Pt admitted for intractable abdominal pain. 69 y/o male with PMHx HTN, HLD, DM, CAD, MI s/p CABG x2 ([id number]) s/p stent January 2019, valve replacement (unable to recall which one), 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 Pt admitted for abdominal pain, 2/2 early pancreatitis vs gastroenteritis.

## 2019-09-09 NOTE — ED ADULT NURSE REASSESSMENT NOTE - NSIMPLEMENTINTERV_GEN_ALL_ED
Implemented All Fall with Harm Risk Interventions:  Beauty to call system. Call bell, personal items and telephone within reach. Instruct patient to call for assistance. Room bathroom lighting operational. Non-slip footwear when patient is off stretcher. Physically safe environment: no spills, clutter or unnecessary equipment. Stretcher in lowest position, wheels locked, appropriate side rails in place. Provide visual cue, wrist band, yellow gown, etc. Monitor gait and stability. Monitor for mental status changes and reorient to person, place, and time. Review medications for side effects contributing to fall risk. Reinforce activity limits and safety measures with patient and family. Provide visual clues: red socks.

## 2019-09-09 NOTE — H&P ADULT - PROBLEM SELECTOR PLAN 5
Chronic, elevated since presentation, may be 2/2 pain  -Pt states he has been complaint with medication   -Continue home Chronic, elevated since presentation, may be 2/2 pain  -Pt states he has been complaint with medication   -Continue home lisinopril 5mg PO qd  -Continue home metoprolol Chronic, elevated since presentation, may be 2/2 pain  -Pt states he has been complaint with medication   -Continue home lisinopril 5mg PO qd  -Continue home metoprolol 25mg PO BID

## 2019-09-09 NOTE — H&P ADULT - PROBLEM SELECTOR PLAN 7
Chronic IMPROVE VTE Individual Risk Assessment        RISK                                                          Points  [  ] Previous VTE                                                3  [  ] Thrombophilia                                             2  [  ] Lower limb paralysis                                   2        (unable to hold up >15 seconds)    [  ] Current Cancer                                             2         (within 6 months)  [  ] Immobilization > 24 hrs                              1  [  ] ICU/CCU stay > 24 hours                             1  [ 1 ] Age > 60                                                         1    IMPROVE VTE Score: 1. SCDs, ambulate as tolerated

## 2019-09-09 NOTE — CONSULT NOTE ADULT - SUBJECTIVE AND OBJECTIVE BOX
PT is a 67 yo male with hx of CABG presents with abdominal pain x 1 day.  PT states he was in his USOH when he developed abdominal pain in epigastric area.  states sharp in nature and getting worse since yesterday. Only hurts on palpation.  Denies any fever/chills.  No hx of similar pain in past.  DEnies any nausea/vomiting. States had some constipation that is resolved at thixs time.  No other complaints at this time    REVIEW OF SYSTEMS:    CONSTITUTIONAL: No weakness, fatigue, malaise, fevers or chills, no weight change, appetite change  EYES: No visual changes; No double vision,  No vertigo, eye pain  Ears: no otalgia, no otorhea, no hearing loss, tinnitus  Nose: no epistaxis, rhinorrhea, sinus pressure  Throat: no throat pain, no oral lesions  NECK: No pain or stiffness  RESPIRATORY: No cough (productive or dry), wheezing, hemoptysis; No shortness of breath  CARDIOVASCULAR: No chest pain or palpitations,    GASTROINTESTINAL: as above  NEUROLOGICAL: No numbness or weakness, headache, memory loss,   SKIN: No pruritis, rashes, lesions or new moles  Psych: No anxiety, sadness, insomnia,    Endocrine: No Heat or Cold intolerance, polydipsia, polyphagia  Heme/Lymph: no LN enlargement, no easy bruising or bleeding     PAST MEDICAL & SURGICAL HISTORY:  Myocardial infarct  H/O heart bypass surgery  Valvular disease  Myocardial infarct  Diabetes mellitus  Scarlet Fever  Systolic Murmur  Other and Unspecified Hyperlipidemia  Benign Essential Hypertension  History of heart valve replacement  S/P CABG (coronary artery bypass graft)  Traumatic Amputation of Finger(s)    Allergies    No Known Allergies    Intolerances    Home Medications:  ASA  metformin    SH-neg x3    .  VITAL SIGNS:  T(C): 36.7 (09-09-19 @ 13:42), Max: 36.7 (09-09-19 @ 13:42)  T(F): 98.1 (09-09-19 @ 13:42), Max: 98.1 (09-09-19 @ 13:42)  HR: 66 (09-09-19 @ 19:20) (65 - 76)  BP: 189/97 (09-09-19 @ 19:20) (184/103 - 200/93)  BP(mean): --  RR: 18 (09-09-19 @ 19:20) (16 - 18)  SpO2: 99% (09-09-19 @ 17:01) (97% - 99%)  Wt(kg): --    PHYSICAL EXAM:    Constitutional:  NAD, resting comfortably in bed  Head: NC/AT  Eyes: PERRL b/l  ENT: MMM  Neck: supple; no JVD or thyromegaly  Respiratory: CTA B/L   Cardiac: +S1/S2; RRR   Gastrointestinal: soft, ND, epigastric and RUQ tenderness  Back: no CVA B/L  Extremities: WWP, no clubbing or cyanosis; no peripheral edema  Musculoskeletal: NROM x4;   Vascular: 2+ radial, femoral, DP/PT pulses B/L  Dermatologic: skin warm, dry and intact; no rashes, wounds, or scars  Lymphatic: no submandibular, cervical or  LAD  Neurologic: AAOx3; CNII-XII grossly intact; no focal deficits  Psychiatric: affect and characteristics of appearance, verbalizations, behaviors are appropriate                          15.9   9.79  )-----------( 347      ( 09 Sep 2019 14:21 )             47.6   09-09    141  |  108  |  14  ----------------------------<  215<H>  4.1   |  27  |  1.20    Ca    9.0      09 Sep 2019 14:21    TPro  8.4<H>  /  Alb  3.7  /  TBili  0.4  /  DBili  x   /  AST  15  /  ALT  28  /  AlkPhos  95  09-09    < from: CT Angio Abdomen and Pelvis w/ IV Cont (09.09.19 @ 16:11) >    Question minimal edema of the pancreatic head adjacent minimal   infiltrative changes may be secondary to fatty replacement however   correlate with amylase lipase levels to exclude early acute pancreatitis.     Minimal gallbladder wall edema without radiopaque gallstones.    No evidence of aortic dissection. No aortic aneurysm.    Hepatomegaly with hepatic steatosis.        < end of copied text >      < from: US Gallbladder (09.09.19 @ 15:02) >  No sonographic evidence of gallstones.    Borderline gallbladder wall thickening, nonspecific in nature    Incidental note of hepatic steatosis        < end of copied text >        HIDA-negative

## 2019-09-09 NOTE — H&P ADULT - PROBLEM SELECTOR PLAN 8
IMPROVE VTE Individual Risk Assessment        RISK                                                          Points  [  ] Previous VTE                                                3  [  ] Thrombophilia                                             2  [  ] Lower limb paralysis                                   2        (unable to hold up >15 seconds)    [  ] Current Cancer                                             2         (within 6 months)  [  ] Immobilization > 24 hrs                              1  [  ] ICU/CCU stay > 24 hours                             1  [ 1 ] Age > 60                                                         1    IMPROVE VTE Score: 1. SCDs, ambulate as tolerated

## 2019-09-09 NOTE — ED ADULT NURSE REASSESSMENT NOTE - NS ED NURSE REASSESS COMMENT FT1
Report from Sheila POWERS RN. Pt received alert and oriented x 4, verbalizing improvement. bp elevated 189/97, MD Leonard made aware. Metoprolol succ 25 mg admin as ordered. Awaiting CE 2 results. 20 g IV noted to right upper arm.

## 2019-09-10 DIAGNOSIS — R10.10 UPPER ABDOMINAL PAIN, UNSPECIFIED: ICD-10-CM

## 2019-09-10 LAB
ALBUMIN SERPL ELPH-MCNC: 3.2 G/DL — LOW (ref 3.3–5)
ALP SERPL-CCNC: 90 U/L — SIGNIFICANT CHANGE UP (ref 40–120)
ALT FLD-CCNC: 26 U/L — SIGNIFICANT CHANGE UP (ref 12–78)
ANION GAP SERPL CALC-SCNC: 7 MMOL/L — SIGNIFICANT CHANGE UP (ref 5–17)
AST SERPL-CCNC: 16 U/L — SIGNIFICANT CHANGE UP (ref 15–37)
BILIRUB SERPL-MCNC: 0.7 MG/DL — SIGNIFICANT CHANGE UP (ref 0.2–1.2)
BUN SERPL-MCNC: 12 MG/DL — SIGNIFICANT CHANGE UP (ref 7–23)
CALCIUM SERPL-MCNC: 8.4 MG/DL — LOW (ref 8.5–10.1)
CHLORIDE SERPL-SCNC: 108 MMOL/L — SIGNIFICANT CHANGE UP (ref 96–108)
CHOLEST SERPL-MCNC: 191 MG/DL — SIGNIFICANT CHANGE UP (ref 10–199)
CO2 SERPL-SCNC: 27 MMOL/L — SIGNIFICANT CHANGE UP (ref 22–31)
CREAT SERPL-MCNC: 1.1 MG/DL — SIGNIFICANT CHANGE UP (ref 0.5–1.3)
GLUCOSE SERPL-MCNC: 144 MG/DL — HIGH (ref 70–99)
HBA1C BLD-MCNC: 7.6 % — HIGH (ref 4–5.6)
HCT VFR BLD CALC: 43.8 % — SIGNIFICANT CHANGE UP (ref 39–50)
HDLC SERPL-MCNC: 30 MG/DL — LOW
HGB BLD-MCNC: 14.9 G/DL — SIGNIFICANT CHANGE UP (ref 13–17)
LIDOCAIN IGE QN: 146 U/L — SIGNIFICANT CHANGE UP (ref 73–393)
LIPID PNL WITH DIRECT LDL SERPL: 121 MG/DL — HIGH
MCHC RBC-ENTMCNC: 30.4 PG — SIGNIFICANT CHANGE UP (ref 27–34)
MCHC RBC-ENTMCNC: 34 GM/DL — SIGNIFICANT CHANGE UP (ref 32–36)
MCV RBC AUTO: 89.4 FL — SIGNIFICANT CHANGE UP (ref 80–100)
NRBC # BLD: 0 /100 WBCS — SIGNIFICANT CHANGE UP (ref 0–0)
PLATELET # BLD AUTO: 307 K/UL — SIGNIFICANT CHANGE UP (ref 150–400)
POTASSIUM SERPL-MCNC: 3.9 MMOL/L — SIGNIFICANT CHANGE UP (ref 3.5–5.3)
POTASSIUM SERPL-SCNC: 3.9 MMOL/L — SIGNIFICANT CHANGE UP (ref 3.5–5.3)
PROT SERPL-MCNC: 7.4 G/DL — SIGNIFICANT CHANGE UP (ref 6–8.3)
RBC # BLD: 4.9 M/UL — SIGNIFICANT CHANGE UP (ref 4.2–5.8)
RBC # FLD: 13.6 % — SIGNIFICANT CHANGE UP (ref 10.3–14.5)
SODIUM SERPL-SCNC: 142 MMOL/L — SIGNIFICANT CHANGE UP (ref 135–145)
TOTAL CHOLESTEROL/HDL RATIO MEASUREMENT: 6.4 RATIO — SIGNIFICANT CHANGE UP (ref 3.4–9.6)
TRIGL SERPL-MCNC: 201 MG/DL — HIGH (ref 10–149)
TROPONIN I SERPL-MCNC: 0.02 NG/ML — SIGNIFICANT CHANGE UP (ref 0.01–0.04)
WBC # BLD: 8.4 K/UL — SIGNIFICANT CHANGE UP (ref 3.8–10.5)
WBC # FLD AUTO: 8.4 K/UL — SIGNIFICANT CHANGE UP (ref 3.8–10.5)

## 2019-09-10 PROCEDURE — 99233 SBSQ HOSP IP/OBS HIGH 50: CPT | Mod: GC

## 2019-09-10 PROCEDURE — 74183 MRI ABD W/O CNTR FLWD CNTR: CPT | Mod: 26

## 2019-09-10 RX ORDER — LANOLIN ALCOHOL/MO/W.PET/CERES
5 CREAM (GRAM) TOPICAL ONCE
Refills: 0 | Status: COMPLETED | OUTPATIENT
Start: 2019-09-10 | End: 2019-09-10

## 2019-09-10 RX ADMIN — Medication 81 MILLIGRAM(S): at 11:07

## 2019-09-10 RX ADMIN — Medication 25 MILLIGRAM(S): at 05:57

## 2019-09-10 RX ADMIN — ATORVASTATIN CALCIUM 80 MILLIGRAM(S): 80 TABLET, FILM COATED ORAL at 21:00

## 2019-09-10 RX ADMIN — CLOPIDOGREL BISULFATE 75 MILLIGRAM(S): 75 TABLET, FILM COATED ORAL at 11:07

## 2019-09-10 RX ADMIN — LISINOPRIL 5 MILLIGRAM(S): 2.5 TABLET ORAL at 05:57

## 2019-09-10 RX ADMIN — ISOSORBIDE DINITRATE 30 MILLIGRAM(S): 5 TABLET ORAL at 11:07

## 2019-09-10 RX ADMIN — Medication 5 MILLIGRAM(S): at 01:23

## 2019-09-10 NOTE — PROGRESS NOTE ADULT - PROBLEM SELECTOR PLAN 6
Chronic, on home metformin unknown dose will need to confirm in am  start insulin sliding scale , hypoglycemia protocol - Chronic, on home metformin unknown dose   - Continue insulin sliding scale , hypoglycemia protocol

## 2019-09-10 NOTE — PROGRESS NOTE ADULT - PROBLEM SELECTOR PLAN 5
Chronic, elevated since presentation, may be 2/2 pain  -Continue home lisinopril 5mg PO qd  -Continue home metoprolol 25mg PO BID - Chronic, elevated since presentation, may be 2/2 pain  - Continue home lisinopril 5mg PO qd  - Continue home metoprolol 25mg PO BID

## 2019-09-10 NOTE — PROGRESS NOTE ADULT - PROBLEM SELECTOR PLAN 3
Chronic, stable   -S/p CABG x2 (4433-5031), s/p stent in Jan 2019  -Continue home ASA 81mg PO qd  -Continue home Plavix 75mg PO qd  -Continue home isosorbide dinitrate 30mg PO qd - Chronic, stable   - S/p CABG x2 (5688-7803), s/p stent in Jan 2019  - Continue home ASA 81mg PO qd  - Continue home Plavix 75mg PO qd  - Continue home isosorbide dinitrate 30mg PO qd

## 2019-09-10 NOTE — PROGRESS NOTE ADULT - SUBJECTIVE AND OBJECTIVE BOX
pt seen  feeling a little better  -n-v  ICU Vital Signs Last 24 Hrs  T(C): 36.8 (10 Sep 2019 05:24), Max: 36.9 (09 Sep 2019 21:45)  T(F): 98.2 (10 Sep 2019 05:24), Max: 98.4 (09 Sep 2019 21:45)  HR: 67 (10 Sep 2019 13:05) (63 - 70)  BP: 92/58 (10 Sep 2019 13:05) (92/58 - 200/93)  BP(mean): --  ABP: --  ABP(mean): --  RR: 19 (10 Sep 2019 05:24) (16 - 19)  SpO2: 96% (10 Sep 2019 05:24) (96% - 99%)  gen-NAD  resp-clear  abd-soft ND, still RUQ tenderness                          14.9   8.40  )-----------( 307      ( 10 Sep 2019 08:05 )             43.8   09-10    142  |  108  |  12  ----------------------------<  144<H>  3.9   |  27  |  1.10    Ca    8.4<L>      10 Sep 2019 08:07    TPro  7.4  /  Alb  3.2<L>  /  TBili  0.7  /  DBili  x   /  AST  16  /  ALT  26  /  AlkPhos  90  09-10

## 2019-09-10 NOTE — CONSULT NOTE ADULT - PROBLEM SELECTOR RECOMMENDATION 9
imaging reviewed, neg for acute jose, ?edema of pancreatic head w adjacent infiltrative changes on ct; lipase wnl  dw sx, rec mrcp for further eval of pain  if neg, will consider egd to r/o pud  rec ppi qd, anti emetics as needed  pain control prn  monitor exam  further recs pending above

## 2019-09-10 NOTE — PROGRESS NOTE ADULT - PROBLEM SELECTOR PLAN 2
- resolved  -Acute, was likely 2/2 pain   -Continue with pain regimen  - saturating well on RA - resolved  - Acute, was likely 2/2 pain   - saturating well on RA  - Continue with pain regimen

## 2019-09-10 NOTE — PROGRESS NOTE ADULT - PROBLEM SELECTOR PLAN 1
Acute,  significant pain on exam.  could be 2/2 early pancreatitis given CT angio findings of questionable minimal edema of the pancreatic head  -both lipase values WNL  -AM metabolic panel WNL, slight diminished albumin @ 3.2, not concerning  -Will place NPO in preparation for MRCP today, will advance diet after MCRP   - NaCl IVF fluid  -Pain regimen in place  -HIDA negative for cholecystitis, aortic aneurysm & aortic dissection ruled out   -Cardiology consulted, recs appreciated. Does not feel the pain is cardiac in nature, trops negative x2, no evidence for an acute coronary syndrome  -Surgery consulted, recs appreciated  - GI consult- Cas, will continue to follow recs - Patient presented with acute significant pain on exam possibly 2/2 early pancreatitis given CT angio findings of questionable minimal edema of the pancreatic head  - HIDA negative for cholecystitis, aortic aneurysm & aortic dissection ruled out   - Cardiology consulted, recs appreciated. Does not feel the pain is cardiac in nature, trops negative x2, no evidence for an acute coronary syndrome  - lipase values WNL, today 81  - AM metabolic panel WNL, slight diminished albumin @ 3.2, not concerning  - Will place NPO in preparation for MRCP today, will advance diet after MCRP   - NaCl IVF fluid  - Pain regimen in place  - Surgery consulted, recs appreciated  - GI consult- Cas, will continue to follow recs
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
## 2019-09-10 NOTE — PROGRESS NOTE ADULT - PROBLEM SELECTOR PLAN 4
Chronic   -Continue home atorvastatin 80 mg PO qhs  -Follow up AM lipid profile - Chronic   - Continue home atorvastatin 80 mg PO qhs  - Follow up AM lipid profile

## 2019-09-10 NOTE — PROGRESS NOTE ADULT - SUBJECTIVE AND OBJECTIVE BOX
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 Pt states he was riding on his motorcycle when he started to feel "funny" - didn't eat much for the rest of the day, pt woke up this AM with "achy" RUQ abdominal pain radiating straight through to his back. He rates the pain as 3/10 at rest, 7/10 with inspiration. Pt also admits to some SOB. He states as the day has progressed he feels the pain is under his rib cage. It is reproducible with palpation.  He reports one other similar episode ~12 years back and said he never found out the cause of the pain. He states it does not feel like his prior cardiac events.  Pt also admits to increased urinary frequency for the past few nights. Denies dysuria, hesitancy, hematuria. Patient also denies headache, changes in vision, dizziness, fevers, chills, chest pain, MICHELE, N/V/D/C.      In the ED, VS T 98.1F, HR 76, /103, RR 16 SpO2 97% on RA, lipase WNL, trops negative x2  Pt received labetalol 10mg IVP x1, Toprol XL 25mg PO x1   EKG: Sinus rhythm with 1st degree AV block, left anterior fascicular block, min voltage criteria for LVH (may be normal variant), anteroseptal infarct, age undetermined   CXR: No active pulmonary disease   HIDA scan: Normal hepatobiliary scan, no radionuclide evidence of acute cholecystitis  US gallbladder: No sonographic evidence of gallstones, borderline gallbladder wall thickening, nonspecific in nature, incidental note of hepatic steatosis  CT angio chest/ CT angio abdomen pelvis: Question minimal edema of the pancreatic head adjacent minimal infiltrative changes may be secondary to fatty replacement however correlate with amylase lipase levels to exclude early acute pancreatitis. Minimal gallbladder wall edema without radiopaque gallstones. No evidence of aortic dissection. No aortic aneurysm. Hepatomegaly with hepatic steatosis. (09 Sep 2019 21:33)      ----  INTERVAL HPI/OVERNIGHT EVENTS: Pt seen and evaluated at the bedside. No acute overnight events occurred.     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  Diverticulitis  Myocardial infarct  H/O heart bypass surgery: CABG x2  Valvular disease: Valve replacement  Diabetes mellitus  Scarlet Fever  Systolic Murmur  Other and Unspecified Hyperlipidemia  Benign Essential Hypertension  History of heart valve replacement  S/P CABG (coronary artery bypass graft)  Traumatic Amputation of Finger(s)      FAMILY HISTORY:  FH: breast cancer  FH: diabetes mellitus  FH: ovarian cancer  FH: lung cancer      Allergies    No Known Allergies    Intolerances        ----  REVIEW OF SYSTEMS:  CONSTITUTIONAL: denies fever, chills, fatigue, weakness  HEENT: denies blurred vision, sore throat  SKIN: denies new lesions, rash  CARDIOVASCULAR: denies chest pain, chest pressure, palpitations  RESPIRATORY: denies shortness of breath, sputum production  GASTROINTESTINAL: denies nausea, vomiting, diarrhea, abdominal pain  GENITOURINARY: denies dysuria, discharge  NEUROLOGICAL: denies numbness, headache, focal weakness  MUSCULOSKELETAL: denies new joint pain, muscle aches  HEMATOLOGIC: denies gross bleeding, bruising  LYMPHATICS: denies enlarged lymph nodes, extremity swelling  PSYCHIATRIC: denies recent changes in anxiety, depression  ENDOCRINOLOGIC: denies sweating, cold or heat intolerance    ----  PHYSICAL EXAM:  GENERAL: patient appears well, no acute distress, appropriately interactive  EYES: sclera clear, no exudates  ENMT: oropharynx clear without erythema, moist mucous membranes  NECK: supple, soft, no thyromegaly noted  LUNGS: good air entry bilaterally, clear to auscultation, symmetric breath sounds, no wheezing or rhonchi appreciated  HEART: soft S1/S2, regular rate and rhythm, no murmurs noted, no noted edema to b/l LE  GASTROINTESTINAL: abdomen is soft, nontender, nondistended, normoactive bowel sounds, no palpable masses  INTEGUMENT: good skin turgor, appropriate for ethnicity, appears well perfused, no jaundice noted  MUSCULOSKELETAL: no clubbing or cyanosis, no obvious deformity  NEUROLOGIC: awake, alert, oriented x3, good muscle tone in 4 extremities, no obvious sensory deficits  PSYCHIATRIC: mood is good, affect is congruent with mood, linear and logical thought process  HEME/LYMPH: no palpable supraclavicular nodules, no obvious ecchymosis     T(C): 36.8 (09-10-19 @ 05:24), Max: 36.9 (09-09-19 @ 21:45)  HR: 63 (09-10-19 @ 10:49) (63 - 76)  BP: 125/75 (09-10-19 @ 10:49) (117/74 - 200/93)  RR: 19 (09-10-19 @ 05:24) (16 - 19)  SpO2: 96% (09-10-19 @ 05:24) (96% - 99%)  Wt(kg): --    ----  I&O's Summary    09 Sep 2019 07:01  -  10 Sep 2019 07:00  --------------------------------------------------------  IN: 400 mL / OUT: 0 mL / NET: 400 mL        LABS:                        14.9   8.40  )-----------( 307      ( 10 Sep 2019 08:05 )             43.8     09-10    142  |  108  |  12  ----------------------------<  144<H>  3.9   |  27  |  1.10    Ca    8.4<L>      10 Sep 2019 08:07    TPro  7.4  /  Alb  3.2<L>  /  TBili  0.7  /  DBili  x   /  AST  16  /  ALT  26  /  AlkPhos  90  09-10        CAPILLARY BLOOD GLUCOSE      POCT Blood Glucose.: 146 mg/dL (10 Sep 2019 07:07)  POCT Blood Glucose.: 134 mg/dL (09 Sep 2019 22:42)                ----  Personally reviewed:  Vital sign trends: [  ] yes    [  ] no     [  ] n/a  Laboratory results: [  ] yes    [  ] no     [  ] n/a  Radiology results: [  ] yes    [  ] no     [  ] n/a  Culture results: [  ] yes    [  ] no     [  ] n/a  Consultant recommendations: [  ] yes    [  ] no     [  ] n/a Patient is a 68y old  Male who presents with a chief complaint of Intractable pain (10 Sep 2019 11:26)      FROM ADMISSION H+P:   HPI:  69 y/o male with PMHx HTN, HLD, DM, CAD, MI s/p CABG x2 ([id number]) s/p stent January 2019, bicuspid aortic valve s/p replacement (unable to recall which one), 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 Pt states he was riding on his motorcycle when he started to feel "funny" - didn't eat much for the rest of the day, pt woke up this AM with "achy" RUQ abdominal pain radiating straight through to his back. He rates the pain as 3/10 at rest, 7/10 with inspiration. Pt also admits to some SOB. He states as the day has progressed he feels the pain is under his rib cage. It is reproducible with palpation.  He reports one other similar episode ~12 years back and said he never found out the cause of the pain. He states it does not feel like his prior cardiac events.  Pt also admits to increased urinary frequency for the past few nights. Denies dysuria, hesitancy, hematuria. Patient also denies headache, changes in vision, dizziness, fevers, chills, chest pain, MICHELE, N/V/D/C.      In the ED, VS T 98.1F, HR 76, /103, RR 16 SpO2 97% on RA, lipase WNL, trops negative x2  Pt received labetalol 10mg IVP x1, Toprol XL 25mg PO x1   EKG: Sinus rhythm with 1st degree AV block, left anterior fascicular block, min voltage criteria for LVH (may be normal variant), anteroseptal infarct, age undetermined   CXR: No active pulmonary disease   HIDA scan: Normal hepatobiliary scan, no radionuclide evidence of acute cholecystitis  US gallbladder: No sonographic evidence of gallstones, borderline gallbladder wall thickening, nonspecific in nature, incidental note of hepatic steatosis  CT angio chest/ CT angio abdomen pelvis: Question minimal edema of the pancreatic head adjacent minimal infiltrative changes may be secondary to fatty replacement however correlate with amylase lipase levels to exclude early acute pancreatitis. Minimal gallbladder wall edema without radiopaque gallstones. No evidence of aortic dissection. No aortic aneurysm. Hepatomegaly with hepatic steatosis. (09 Sep 2019 21:33)      ----  INTERVAL HPI/OVERNIGHT EVENTS: Pt seen and evaluated at the bedside. No acute overnight events occurred. Pt admits to no pain at rest but slight plain on palpation of his right upper quadrant. He tolerated his liquid diet and has appetite for a more advanced diet. He denies any nausea vomiting diarrhea, fevers, or chills.     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  Diverticulitis  Myocardial infarct  H/O heart bypass surgery: CABG x2  Valvular disease: Valve replacement  Diabetes mellitus  Scarlet Fever  Systolic Murmur  Other and Unspecified Hyperlipidemia  Benign Essential Hypertension  History of heart valve replacement  S/P CABG (coronary artery bypass graft)  Traumatic Amputation of Finger(s)      FAMILY HISTORY:  FH: breast cancer  FH: diabetes mellitus  FH: ovarian cancer  FH: lung cancer      Allergies    No Known Allergies    Intolerances        ----  REVIEW OF SYSTEMS:  CONSTITUTIONAL: denies fever, chills, fatigue, weakness  CARDIOVASCULAR: denies chest pain, chest pressure, palpitations  RESPIRATORY: denies shortness of breath, sputum production  GASTROINTESTINAL: denies nausea, vomiting, diarrhea, abdominal pain  GENITOURINARY: denies dysuria, discharge  NEUROLOGICAL: denies numbness, headache, focal weakness  MUSCULOSKELETAL: denies new joint pain, muscle aches  HEMATOLOGIC: denies gross bleeding, bruising  LYMPHATICS: denies enlarged lymph nodes, extremity swelling  PSYCHIATRIC: denies recent changes in anxiety, depression  ENDOCRINOLOGIC: denies sweating, cold or heat intolerance    ----  PHYSICAL EXAM:  GENERAL: patient appears well, no acute distress, appropriately interactive  EYES: sclera clear, no exudates  ENMT: oropharynx clear without erythema, moist mucous membranes  NECK: supple, soft, no thyromegaly noted  LUNGS: good air entry bilaterally, clear to auscultation, symmetric breath sounds, no wheezing or rhonchi appreciated  HEART: soft S1/S2, regular rate and rhythm, no murmurs noted, 2+ edema to b/l LE  GASTROINTESTINAL: abdomen is soft, tender to palpation in RUQ, nondistended, normoactive bowel sounds, no palpable masses.   INTEGUMENT: good skin turgor, appropriate for ethnicity, appears well perfused, no jaundice noted  MUSCULOSKELETAL: no clubbing or cyanosis, no obvious deformity  NEUROLOGIC: awake, alert, oriented x3, good muscle tone in 4 extremities, no obvious sensory deficits      T(C): 36.8 (09-10-19 @ 05:24), Max: 36.9 (09-09-19 @ 21:45)  HR: 63 (09-10-19 @ 10:49) (63 - 76)  BP: 125/75 (09-10-19 @ 10:49) (117/74 - 200/93)  RR: 19 (09-10-19 @ 05:24) (16 - 19)  SpO2: 96% (09-10-19 @ 05:24) (96% - 99%)  Wt(kg): --    ----  I&O's Summary    09 Sep 2019 07:01  -  10 Sep 2019 07:00  --------------------------------------------------------  IN: 400 mL / OUT: 0 mL / NET: 400 mL        LABS:                        14.9   8.40  )-----------( 307      ( 10 Sep 2019 08:05 )             43.8     09-10    142  |  108  |  12  ----------------------------<  144<H>  3.9   |  27  |  1.10    Ca    8.4<L>      10 Sep 2019 08:07    TPro  7.4  /  Alb  3.2<L>  /  TBili  0.7  /  DBili  x   /  AST  16  /  ALT  26  /  AlkPhos  90  09-10        CAPILLARY BLOOD GLUCOSE      POCT Blood Glucose.: 146 mg/dL (10 Sep 2019 07:07)  POCT Blood Glucose.: 134 mg/dL (09 Sep 2019 22:42)                ----  Personally reviewed:  Vital sign trends: [x  ] yes    [  ] no     [  ] n/a  Laboratory results: [x  ] yes    [  ] no     [  ] n/a  Radiology results: [ x] yes    [  ] no     [  ] n/a  Culture results: [  ] yes    [  ] no     [ x ] n/a  Consultant recommendations: [x  ] yes    [  ] no     [  ] n/a Patient is a 68y old  Male who presents with a chief complaint of Intractable pain (10 Sep 2019 11:26)      FROM ADMISSION H+P:   HPI:  67 y/o male with PMHx HTN, HLD, DM, CAD, MI s/p CABG x2 (0212-1053) s/p stent January 2019, bicuspid aortic valve s/p replacement (unable to recall which one), 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 Pt states he was riding on his motorcycle when he started to feel "funny" - didn't eat much for the rest of the day, pt woke up this AM with "achy" RUQ abdominal pain radiating straight through to his back. He rates the pain as 3/10 at rest, 7/10 with inspiration. Pt also admits to some SOB. He states as the day has progressed he feels the pain is under his rib cage. It is reproducible with palpation.  He reports one other similar episode ~12 years back and said he never found out the cause of the pain. He states it does not feel like his prior cardiac events.  Pt also admits to increased urinary frequency for the past few nights. Denies dysuria, hesitancy, hematuria. Patient also denies headache, changes in vision, dizziness, fevers, chills, chest pain, MICHELE, N/V/D/C.      In the ED, VS T 98.1F, HR 76, /103, RR 16 SpO2 97% on RA, lipase WNL, trops negative x2  Pt received labetalol 10mg IVP x1, Toprol XL 25mg PO x1   EKG: Sinus rhythm with 1st degree AV block, left anterior fascicular block, min voltage criteria for LVH (may be normal variant), anteroseptal infarct, age undetermined   CXR: No active pulmonary disease   HIDA scan: Normal hepatobiliary scan, no radionuclide evidence of acute cholecystitis  US gallbladder: No sonographic evidence of gallstones, borderline gallbladder wall thickening, nonspecific in nature, incidental note of hepatic steatosis  CT angio chest/ CT angio abdomen pelvis: Question minimal edema of the pancreatic head adjacent minimal infiltrative changes may be secondary to fatty replacement however correlate with amylase lipase levels to exclude early acute pancreatitis. Minimal gallbladder wall edema without radiopaque gallstones. No evidence of aortic dissection. No aortic aneurysm. Hepatomegaly with hepatic steatosis. (09 Sep 2019 21:33)      ----  INTERVAL HPI/OVERNIGHT EVENTS: Pt seen and evaluated at the bedside. No acute overnight events occurred. Pt admits to no pain at rest but slight plain on palpation of his right upper quadrant. He tolerated his liquid diet and has appetite for a more advanced diet. He denies any nausea vomiting diarrhea, fevers, or chills.     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  Diverticulitis  Myocardial infarct  H/O heart bypass surgery: CABG x2  Valvular disease: Valve replacement  Diabetes mellitus  Scarlet Fever  Systolic Murmur  Other and Unspecified Hyperlipidemia  Benign Essential Hypertension  History of heart valve replacement  S/P CABG (coronary artery bypass graft)  Traumatic Amputation of Finger(s)      FAMILY HISTORY:  FH: breast cancer  FH: diabetes mellitus  FH: ovarian cancer  FH: lung cancer      Allergies    No Known Allergies    Intolerances        ----  REVIEW OF SYSTEMS:  CONSTITUTIONAL: denies fever, chills, fatigue, weakness  CARDIOVASCULAR: denies chest pain, chest pressure, palpitations  RESPIRATORY: denies shortness of breath, sputum production  GASTROINTESTINAL: denies nausea, vomiting, diarrhea, admits to abdominal pain in RUQ when area is palpated, admits to abdominal pain in RUQ when taking deep breaths  GENITOURINARY: denies dysuria, discharge, or urinary frequency   NEUROLOGICAL: denies numbness, headache, focal weakness  MUSCULOSKELETAL: denies new joint pain, muscle aches  HEMATOLOGIC: denies gross bleeding, bruising  LYMPHATICS: denies enlarged lymph nodes, admits to chronic LE swelling      ----  PHYSICAL EXAM:  GENERAL: patient appears well, no acute distress, appropriately interactive  EYES: sclera clear, no exudates  ENMT: oropharynx clear without erythema, moist mucous membranes  LUNGS: good air entry bilaterally, clear to auscultation, symmetric breath sounds, no wheezing or rhonchi appreciated  HEART: soft S1/S2, regular rate and rhythm, no murmurs noted, 2+ edema to b/l LE  GASTROINTESTINAL: abdomen is soft, tender to palpation in RUQ, nondistended, normoactive bowel sounds, no palpable masses.   INTEGUMENT: good skin turgor, appropriate for ethnicity, appears well perfused, no jaundice noted  MUSCULOSKELETAL: no clubbing or cyanosis, no obvious deformity  NEUROLOGIC: awake, alert, oriented x3, good muscle tone in 4 extremities, no obvious sensory deficits      T(C): 36.8 (09-10-19 @ 05:24), Max: 36.9 (09-09-19 @ 21:45)  HR: 63 (09-10-19 @ 10:49) (63 - 76)  BP: 125/75 (09-10-19 @ 10:49) (117/74 - 200/93)  RR: 19 (09-10-19 @ 05:24) (16 - 19)  SpO2: 96% (09-10-19 @ 05:24) (96% - 99%)  Wt(kg): --    ----  I&O's Summary    09 Sep 2019 07:01  -  10 Sep 2019 07:00  --------------------------------------------------------  IN: 400 mL / OUT: 0 mL / NET: 400 mL        LABS:                        14.9   8.40  )-----------( 307      ( 10 Sep 2019 08:05 )             43.8     09-10    142  |  108  |  12  ----------------------------<  144<H>  3.9   |  27  |  1.10    Ca    8.4<L>      10 Sep 2019 08:07    TPro  7.4  /  Alb  3.2<L>  /  TBili  0.7  /  DBili  x   /  AST  16  /  ALT  26  /  AlkPhos  90  09-10        CAPILLARY BLOOD GLUCOSE      POCT Blood Glucose.: 146 mg/dL (10 Sep 2019 07:07)  POCT Blood Glucose.: 134 mg/dL (09 Sep 2019 22:42)                ----  Personally reviewed:  Vital sign trends: [x  ] yes    [  ] no     [  ] n/a  Laboratory results: [x  ] yes    [  ] no     [  ] n/a  Radiology results: [ x] yes    [  ] no     [  ] n/a  Culture results: [  ] yes    [  ] no     [ x ] n/a  Consultant recommendations: [x  ] yes    [  ] no     [  ] n/a Patient is a 68y old  Male who presents with a chief complaint of Intractable pain (10 Sep 2019 11:26)      FROM ADMISSION H+P:   HPI:  67 y/o male with PMHx HTN, HLD, DM, CAD, MI s/p CABG x2 ([id number]) s/p stent January 2019, bicuspid aortic valve s/p replacement (unable to recall which one), 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 Pt states he was riding on his motorcycle when he started to feel "funny" - didn't eat much for the rest of the day, pt woke up this AM with "achy" RUQ abdominal pain radiating straight through to his back. He rates the pain as 3/10 at rest, 7/10 with inspiration. Pt also admits to some SOB. He states as the day has progressed he feels the pain is under his rib cage. It is reproducible with palpation.  He reports one other similar episode ~12 years back and said he never found out the cause of the pain. He states it does not feel like his prior cardiac events.  Pt also admits to increased urinary frequency for the past few nights. Denies dysuria, hesitancy, hematuria. Patient also denies headache, changes in vision, dizziness, fevers, chills, chest pain, MICHELE, N/V/D/C.      In the ED, VS T 98.1F, HR 76, /103, RR 16 SpO2 97% on RA, lipase WNL, trops negative x2  Pt received labetalol 10mg IVP x1, Toprol XL 25mg PO x1   EKG: Sinus rhythm with 1st degree AV block, left anterior fascicular block, min voltage criteria for LVH (may be normal variant), anteroseptal infarct, age undetermined   CXR: No active pulmonary disease   HIDA scan: Normal hepatobiliary scan, no radionuclide evidence of acute cholecystitis  US gallbladder: No sonographic evidence of gallstones, borderline gallbladder wall thickening, nonspecific in nature, incidental note of hepatic steatosis  CT angio chest/ CT angio abdomen pelvis: Question minimal edema of the pancreatic head adjacent minimal infiltrative changes may be secondary to fatty replacement however correlate with amylase lipase levels to exclude early acute pancreatitis. Minimal gallbladder wall edema without radiopaque gallstones. No evidence of aortic dissection. No aortic aneurysm. Hepatomegaly with hepatic steatosis. (09 Sep 2019 21:33)      ----  INTERVAL HPI/OVERNIGHT EVENTS:   Patient seen and examined at the bedside. No acute overnight events occurred. Patient admits to no pain at rest but slight plain on palpation of his right upper quadrant that he states has improved. He tolerated his liquid diet and has appetite for a more advanced diet. He denies any nausea vomiting diarrhea, fevers, or chills.     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  Diverticulitis  Myocardial infarct  H/O heart bypass surgery: CABG x2  Valvular disease: Valve replacement  Diabetes mellitus  Scarlet Fever  Systolic Murmur  Other and Unspecified Hyperlipidemia  Benign Essential Hypertension  History of heart valve replacement  S/P CABG (coronary artery bypass graft)  Traumatic Amputation of Finger(s)      FAMILY HISTORY:  FH: breast cancer  FH: diabetes mellitus  FH: ovarian cancer  FH: lung cancer      Allergies    No Known Allergies    Intolerances        ----  REVIEW OF SYSTEMS:  CONSTITUTIONAL: denies fever, chills, fatigue, weakness  CARDIOVASCULAR: denies chest pain, chest pressure, palpitations  RESPIRATORY: denies shortness of breath, sputum production  GASTROINTESTINAL: denies nausea, vomiting, diarrhea, admits to abdominal pain in RUQ when area is palpated, admits to abdominal pain in RUQ when taking deep breaths  GENITOURINARY: denies dysuria, discharge, or urinary frequency   NEUROLOGICAL: denies numbness, headache, focal weakness  MUSCULOSKELETAL: denies new joint pain, muscle aches  HEMATOLOGIC: denies gross bleeding, bruising  LYMPHATICS: denies enlarged lymph nodes, admits to chronic LE swelling      ----  PHYSICAL EXAM:  GENERAL: patient appears well, no acute distress, appropriately interactive  EYES: sclera clear, no exudates  ENMT: oropharynx clear without erythema, moist mucous membranes  LUNGS: good air entry bilaterally, clear to auscultation, symmetric breath sounds, no wheezing or rhonchi appreciated  HEART: soft S1/S2, regular rate and rhythm, no murmurs noted, 2+ edema to b/l LE  GASTROINTESTINAL: abdomen is soft, tender to palpation in RUQ, nondistended, normoactive bowel sounds, no palpable masses.   INTEGUMENT: good skin turgor, appropriate for ethnicity, appears well perfused, no jaundice noted  MUSCULOSKELETAL: no clubbing or cyanosis, no obvious deformity  NEUROLOGIC: awake, alert, oriented x3, good muscle tone in 4 extremities, no obvious sensory deficits      T(C): 36.8 (09-10-19 @ 05:24), Max: 36.9 (09-09-19 @ 21:45)  HR: 63 (09-10-19 @ 10:49) (63 - 76)  BP: 125/75 (09-10-19 @ 10:49) (117/74 - 200/93)  RR: 19 (09-10-19 @ 05:24) (16 - 19)  SpO2: 96% (09-10-19 @ 05:24) (96% - 99%)  Wt(kg): --    ----  I&O's Summary    09 Sep 2019 07:01  -  10 Sep 2019 07:00  --------------------------------------------------------  IN: 400 mL / OUT: 0 mL / NET: 400 mL        LABS:                        14.9   8.40  )-----------( 307      ( 10 Sep 2019 08:05 )             43.8     09-10    142  |  108  |  12  ----------------------------<  144<H>  3.9   |  27  |  1.10    Ca    8.4<L>      10 Sep 2019 08:07    TPro  7.4  /  Alb  3.2<L>  /  TBili  0.7  /  DBili  x   /  AST  16  /  ALT  26  /  AlkPhos  90  09-10        CAPILLARY BLOOD GLUCOSE      POCT Blood Glucose.: 146 mg/dL (10 Sep 2019 07:07)  POCT Blood Glucose.: 134 mg/dL (09 Sep 2019 22:42)                ----  Personally reviewed:  Vital sign trends: [x  ] yes    [  ] no     [  ] n/a  Laboratory results: [x  ] yes    [  ] no     [  ] n/a  Radiology results: [ x] yes    [  ] no     [  ] n/a  Culture results: [  ] yes    [  ] no     [ x ] n/a  Consultant recommendations: [x  ] yes    [  ] no     [  ] n/a Patient is a 68y old  Male who presents with a chief complaint of Intractable pain (10 Sep 2019 11:26)      FROM ADMISSION H+P:   HPI:  67 y/o male with PMHx HTN, HLD, DM, CAD, MI s/p CABG x2 (2630-4016) s/p stent January 2019, bicuspid aortic valve s/p replacement (unable to recall which one), 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 Pt states he was riding on his motorcycle when he started to feel "funny" - didn't eat much for the rest of the day, pt woke up this AM with "achy" RUQ abdominal pain radiating straight through to his back. He rates the pain as 3/10 at rest, 7/10 with inspiration. Pt also admits to some SOB. He states as the day has progressed he feels the pain is under his rib cage. It is reproducible with palpation.  He reports one other similar episode ~12 years back and said he never found out the cause of the pain. He states it does not feel like his prior cardiac events.  Pt also admits to increased urinary frequency for the past few nights. Denies dysuria, hesitancy, hematuria. Patient also denies headache, changes in vision, dizziness, fevers, chills, chest pain, MICHELE, N/V/D/C.      ----  INTERVAL HPI/OVERNIGHT EVENTS:   Patient seen and examined at the bedside. No acute overnight events occurred. Patient admits to no pain at rest but slight plain on palpation of his right upper quadrant that he states has improved. He tolerated his liquid diet and has appetite for a more advanced diet. He denies any nausea vomiting diarrhea, fevers, or chills.     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  Diverticulitis  Myocardial infarct  H/O heart bypass surgery: CABG x2  Valvular disease: Valve replacement  Diabetes mellitus  Scarlet Fever  Systolic Murmur  Other and Unspecified Hyperlipidemia  Benign Essential Hypertension  History of heart valve replacement  S/P CABG (coronary artery bypass graft)  Traumatic Amputation of Finger(s)      FAMILY HISTORY:  FH: breast cancer  FH: diabetes mellitus  FH: ovarian cancer  FH: lung cancer      Allergies    No Known Allergies    Intolerances        ----  REVIEW OF SYSTEMS:  CONSTITUTIONAL: denies fever, chills, fatigue, weakness  CARDIOVASCULAR: denies chest pain, chest pressure, palpitations  RESPIRATORY: denies shortness of breath, sputum production  GASTROINTESTINAL: denies nausea, vomiting, diarrhea, admits to abdominal pain in RUQ when area is palpated, admits to abdominal pain in RUQ when taking deep breaths  GENITOURINARY: denies dysuria, discharge, or urinary frequency   NEUROLOGICAL: denies numbness, headache, focal weakness  MUSCULOSKELETAL: denies new joint pain, muscle aches  HEMATOLOGIC: denies gross bleeding, bruising  LYMPHATICS: denies enlarged lymph nodes, admits to chronic LE swelling  PSYCH: denies anxiety, depression      ----  PHYSICAL EXAM:  GENERAL: patient appears well, no acute distress, appropriately interactive  EYES: sclera clear, no exudates  ENMT: oropharynx clear without erythema, moist mucous membranes  LUNGS: good air entry bilaterally, clear to auscultation, symmetric breath sounds, no wheezing or rhonchi appreciated  HEART: soft S1/S2, regular rate and rhythm, no murmurs noted, 2+ edema to b/l LE  GASTROINTESTINAL: abdomen is soft, tender to palpation in RUQ, nondistended, normoactive bowel sounds, no palpable masses.   INTEGUMENT: good skin turgor, appropriate for ethnicity, appears well perfused, no jaundice noted  MUSCULOSKELETAL: no clubbing or cyanosis, no obvious deformity  NEUROLOGIC: awake, alert, oriented x3, good muscle tone in 4 extremities, no obvious sensory deficits      T(C): 36.8 (09-10-19 @ 05:24), Max: 36.9 (09-09-19 @ 21:45)  HR: 63 (09-10-19 @ 10:49) (63 - 76)  BP: 125/75 (09-10-19 @ 10:49) (117/74 - 200/93)  RR: 19 (09-10-19 @ 05:24) (16 - 19)  SpO2: 96% (09-10-19 @ 05:24) (96% - 99%)  Wt(kg): --    ----  I&O's Summary    09 Sep 2019 07:01  -  10 Sep 2019 07:00  --------------------------------------------------------  IN: 400 mL / OUT: 0 mL / NET: 400 mL        LABS:                        14.9   8.40  )-----------( 307      ( 10 Sep 2019 08:05 )             43.8     09-10    142  |  108  |  12  ----------------------------<  144<H>  3.9   |  27  |  1.10    Ca    8.4<L>      10 Sep 2019 08:07    TPro  7.4  /  Alb  3.2<L>  /  TBili  0.7  /  DBili  x   /  AST  16  /  ALT  26  /  AlkPhos  90  09-10        CAPILLARY BLOOD GLUCOSE      POCT Blood Glucose.: 146 mg/dL (10 Sep 2019 07:07)  POCT Blood Glucose.: 134 mg/dL (09 Sep 2019 22:42)                ----  Personally reviewed:  Vital sign trends: [x  ] yes    [  ] no     [  ] n/a  Laboratory results: [x  ] yes    [  ] no     [  ] n/a  Radiology results: [ x] yes    [  ] no     [  ] n/a  Culture results: [  ] yes    [  ] no     [ x ] n/a  Consultant recommendations: [x  ] yes    [  ] no     [  ] n/a

## 2019-09-11 ENCOUNTER — TRANSCRIPTION ENCOUNTER (OUTPATIENT)
Age: 69
End: 2019-09-11

## 2019-09-11 DIAGNOSIS — E78.5 HYPERLIPIDEMIA, UNSPECIFIED: ICD-10-CM

## 2019-09-11 LAB
ALBUMIN SERPL ELPH-MCNC: 2.8 G/DL — LOW (ref 3.3–5)
ALP SERPL-CCNC: 78 U/L — SIGNIFICANT CHANGE UP (ref 40–120)
ALT FLD-CCNC: 22 U/L — SIGNIFICANT CHANGE UP (ref 12–78)
ANION GAP SERPL CALC-SCNC: 5 MMOL/L — SIGNIFICANT CHANGE UP (ref 5–17)
AST SERPL-CCNC: 16 U/L — SIGNIFICANT CHANGE UP (ref 15–37)
BILIRUB DIRECT SERPL-MCNC: 0.1 MG/DL — SIGNIFICANT CHANGE UP (ref 0.05–0.2)
BILIRUB INDIRECT FLD-MCNC: 0.6 MG/DL — SIGNIFICANT CHANGE UP (ref 0.2–1)
BILIRUB SERPL-MCNC: 0.7 MG/DL — SIGNIFICANT CHANGE UP (ref 0.2–1.2)
BUN SERPL-MCNC: 14 MG/DL — SIGNIFICANT CHANGE UP (ref 7–23)
CALCIUM SERPL-MCNC: 8.1 MG/DL — LOW (ref 8.5–10.1)
CHLORIDE SERPL-SCNC: 107 MMOL/L — SIGNIFICANT CHANGE UP (ref 96–108)
CO2 SERPL-SCNC: 28 MMOL/L — SIGNIFICANT CHANGE UP (ref 22–31)
CREAT SERPL-MCNC: 0.97 MG/DL — SIGNIFICANT CHANGE UP (ref 0.5–1.3)
GLUCOSE SERPL-MCNC: 124 MG/DL — HIGH (ref 70–99)
HCT VFR BLD CALC: 40.7 % — SIGNIFICANT CHANGE UP (ref 39–50)
HGB BLD-MCNC: 13.9 G/DL — SIGNIFICANT CHANGE UP (ref 13–17)
MAGNESIUM SERPL-MCNC: 2.1 MG/DL — SIGNIFICANT CHANGE UP (ref 1.6–2.6)
MCHC RBC-ENTMCNC: 30.7 PG — SIGNIFICANT CHANGE UP (ref 27–34)
MCHC RBC-ENTMCNC: 34.2 GM/DL — SIGNIFICANT CHANGE UP (ref 32–36)
MCV RBC AUTO: 89.8 FL — SIGNIFICANT CHANGE UP (ref 80–100)
NRBC # BLD: 0 /100 WBCS — SIGNIFICANT CHANGE UP (ref 0–0)
PHOSPHATE SERPL-MCNC: 3 MG/DL — SIGNIFICANT CHANGE UP (ref 2.5–4.5)
PLATELET # BLD AUTO: 269 K/UL — SIGNIFICANT CHANGE UP (ref 150–400)
POTASSIUM SERPL-MCNC: 3.9 MMOL/L — SIGNIFICANT CHANGE UP (ref 3.5–5.3)
POTASSIUM SERPL-SCNC: 3.9 MMOL/L — SIGNIFICANT CHANGE UP (ref 3.5–5.3)
PROT SERPL-MCNC: 6.6 G/DL — SIGNIFICANT CHANGE UP (ref 6–8.3)
RBC # BLD: 4.53 M/UL — SIGNIFICANT CHANGE UP (ref 4.2–5.8)
RBC # FLD: 13.2 % — SIGNIFICANT CHANGE UP (ref 10.3–14.5)
SODIUM SERPL-SCNC: 140 MMOL/L — SIGNIFICANT CHANGE UP (ref 135–145)
WBC # BLD: 7.68 K/UL — SIGNIFICANT CHANGE UP (ref 3.8–10.5)
WBC # FLD AUTO: 7.68 K/UL — SIGNIFICANT CHANGE UP (ref 3.8–10.5)

## 2019-09-11 PROCEDURE — 99232 SBSQ HOSP IP/OBS MODERATE 35: CPT

## 2019-09-11 PROCEDURE — 99239 HOSP IP/OBS DSCHRG MGMT >30: CPT

## 2019-09-11 RX ORDER — OXYCODONE HYDROCHLORIDE 5 MG/1
10 TABLET ORAL ONCE
Refills: 0 | Status: DISCONTINUED | OUTPATIENT
Start: 2019-09-11 | End: 2019-09-11

## 2019-09-11 RX ORDER — ISOSORBIDE DINITRATE 5 MG/1
30 TABLET ORAL ONCE
Refills: 0 | Status: COMPLETED | OUTPATIENT
Start: 2019-09-11 | End: 2019-09-11

## 2019-09-11 RX ORDER — PANTOPRAZOLE SODIUM 20 MG/1
1 TABLET, DELAYED RELEASE ORAL
Qty: 30 | Refills: 0
Start: 2019-09-11 | End: 2019-10-10

## 2019-09-11 RX ADMIN — OXYCODONE HYDROCHLORIDE 10 MILLIGRAM(S): 5 TABLET ORAL at 21:43

## 2019-09-11 RX ADMIN — ATORVASTATIN CALCIUM 80 MILLIGRAM(S): 80 TABLET, FILM COATED ORAL at 21:46

## 2019-09-11 RX ADMIN — Medication 25 MILLIGRAM(S): at 17:48

## 2019-09-11 RX ADMIN — Medication 81 MILLIGRAM(S): at 11:31

## 2019-09-11 RX ADMIN — OXYCODONE HYDROCHLORIDE 10 MILLIGRAM(S): 5 TABLET ORAL at 21:23

## 2019-09-11 RX ADMIN — LISINOPRIL 5 MILLIGRAM(S): 2.5 TABLET ORAL at 05:29

## 2019-09-11 RX ADMIN — Medication 1: at 12:31

## 2019-09-11 RX ADMIN — ISOSORBIDE DINITRATE 30 MILLIGRAM(S): 5 TABLET ORAL at 19:04

## 2019-09-11 RX ADMIN — CLOPIDOGREL BISULFATE 75 MILLIGRAM(S): 75 TABLET, FILM COATED ORAL at 11:31

## 2019-09-11 RX ADMIN — Medication 25 MILLIGRAM(S): at 05:29

## 2019-09-11 NOTE — DISCHARGE NOTE NURSING/CASE MANAGEMENT/SOCIAL WORK - PATIENT PORTAL LINK FT
You can access the FollowMyHealth Patient Portal offered by [facility] by registering at the following website: [email or web address]. By joining [facility]’s FollowMyHealth portal, you will also be able to view your health information using other applications (apps) compatible with our system.

## 2019-09-11 NOTE — PROVIDER CONTACT NOTE (OTHER) - ACTION/TREATMENT ORDERED:
Metoprolol given and BP re-checked and was still high manually. Imdur 30mg to be given
isosorbide was d/c

## 2019-09-11 NOTE — DISCHARGE NOTE PROVIDER - PROVIDER TOKENS
PROVIDER:[TOKEN:[[id number]],FOLLOWUP:[1 week]],PROVIDER:[TOKEN:[[id number]],FOLLOWUP:[1 week]]

## 2019-09-11 NOTE — DISCHARGE NOTE NURSING/CASE MANAGEMENT/SOCIAL WORK - NSDCFUADDAPPT_GEN_ALL_CORE_FT
Follow up with Gastroenterologist closely in 1 week for outpatient endoscopy  follow up with primary doctor in 1 week

## 2019-09-11 NOTE — PROGRESS NOTE ADULT - PROBLEM SELECTOR PLAN 1
- Patient presented with acute significant pain on exam possibly 2/2 early pancreatitis given CT angio findings of questionable minimal edema of the pancreatic head  - HIDA negative for cholecystitis, aortic aneurysm & aortic dissection ruled out   - Cardiology consulted, recs appreciated. Does not feel the pain is cardiac in nature, trops negative x2, no evidence for an acute coronary syndrome  - lipase values WNL, today 81  - AM metabolic panel WNL, slight diminished albumin @ 3.2, not concerning  - MRCP shows mild acute uncomplicated pancreatitis, indeterminate etiology.  - NaCl IVF fluid  - Patient now complaining worsening RUQ abd pain. Pain regimen in place  - Surgery consulted, recs appreciated  - GI consult- Cas, will continue to follow recs

## 2019-09-11 NOTE — PROGRESS NOTE ADULT - PROBLEM SELECTOR PLAN 6
- Chronic, on home metformin unknown dose   - Continue insulin sliding scale , hypoglycemia protocol

## 2019-09-11 NOTE — DISCHARGE NOTE PROVIDER - NSDCCPCAREPLAN_GEN_ALL_CORE_FT
PRINCIPAL DISCHARGE DIAGNOSIS  Diagnosis: Acute pancreatitis  Assessment and Plan of Treatment: You came to the hospital with RUQ abdominal pain most likely secondary to acute pancreatitis.   - Follow up with GI to complete an endoscopy outpatient.      SECONDARY DISCHARGE DIAGNOSES  Diagnosis: Diabetes mellitus  Assessment and Plan of Treatment: You have history of diabetes mellitus type 2. Your hemoglobin A1c is 7.6.   - Continue your home medication for diabetes type 2.   - Follow up with your PCP in a week.    Diagnosis: Benign Essential Hypertension  Assessment and Plan of Treatment: You have a history of hypertension.   - Continue your home medication of metoprolol and lisinopril   - Follow up with your PCP in a week.    Diagnosis: CAD (coronary artery disease)  Assessment and Plan of Treatment: You have a history of coronary artery disease  - Continue your home medication of aspirin, plavix, and isosorbide dinitrate.  - Follow up with your PCP in a week.    Diagnosis: Hyperlipidemia  Assessment and Plan of Treatment: You have a history of hyperlipidemia.  - Continue your home medication of atorvastatin   - Follow up with your PCP in a week. PRINCIPAL DISCHARGE DIAGNOSIS  Diagnosis: Acute pancreatitis  Assessment and Plan of Treatment: You came to the hospital with RUQ abdominal pain and found to have acute pancreatitis.   - Follow up with GI closely to complete an endoscopy outpatient  - follow up with primary doctor in 1 week      SECONDARY DISCHARGE DIAGNOSES  Diagnosis: Hyperlipidemia  Assessment and Plan of Treatment: You have a history of hyperlipidemia.  - Continue your home medication of atorvastatin   - Follow up with your PCP in a week.    Diagnosis: CAD (coronary artery disease)  Assessment and Plan of Treatment: You have a history of coronary artery disease  - Continue your home medication of aspirin, plavix, and isosorbide dinitrate.  - Follow up with your PCP in a week.    Diagnosis: Benign Essential Hypertension  Assessment and Plan of Treatment: You have a history of hypertension.   - Continue your home medication of metoprolol and lisinopril   - Follow up with your PCP in a week.    Diagnosis: Diabetes mellitus  Assessment and Plan of Treatment: You have history of diabetes mellitus type 2. Your hemoglobin A1c is 7.6.   - Continue your home medication for diabetes type 2.   - Follow up with your PCP in a week. PRINCIPAL DISCHARGE DIAGNOSIS  Diagnosis: Acute pancreatitis  Assessment and Plan of Treatment: You came to the hospital with RUQ abdominal pain and found to have acute pancreatitis. Unclear cause  - Follow up with GI within 1 week to complete an endoscopy EGD +/- EUS as outpatient  - follow up with primary doctor in 1 week      SECONDARY DISCHARGE DIAGNOSES  Diagnosis: Duodenitis  Assessment and Plan of Treatment: you were started on acid blocker  see GI as outpatient for EGD within 1 week    Diagnosis: Hyperlipidemia  Assessment and Plan of Treatment: You have a history of hyperlipidemia.  - Continue your home medication of atorvastatin   - Follow up with your PCP in a week.    Diagnosis: CAD (coronary artery disease)  Assessment and Plan of Treatment: You have a history of coronary artery disease  - Continue your home medication of aspirin, plavix, and isosorbide dinitrate.  - Follow up with your PCP in a week.    Diagnosis: Benign Essential Hypertension  Assessment and Plan of Treatment: You have a history of hypertension.   - Continue your home medication of metoprolol and lisinopril   - Follow up with your PCP in a week.    Diagnosis: Diabetes mellitus  Assessment and Plan of Treatment: You have history of diabetes mellitus type 2. Your hemoglobin A1c is 7.6.   - Continue your home medication for diabetes type 2.   - Follow up with your PCP in a week. PRINCIPAL DISCHARGE DIAGNOSIS  Diagnosis: Acute pancreatitis  Assessment and Plan of Treatment: You came to the hospital with RUQ abdominal pain and found to have acute pancreatitis. Unclear cause  - Follow up with GI within 1 week to complete an endoscopy EGD +/- EUS as outpatient  - follow up with primary doctor in 1 week      SECONDARY DISCHARGE DIAGNOSES  Diagnosis: Duodenitis  Assessment and Plan of Treatment: you were started on acid blocker  see GI as outpatient for EGD within 1 week    Diagnosis: Hyperlipidemia  Assessment and Plan of Treatment: You have a history of hyperlipidemia.  - Continue your home medication of atorvastatin   - Follow up with your PCP in a week.    Diagnosis: CAD (coronary artery disease)  Assessment and Plan of Treatment: You have a history of coronary artery disease  - Continue your home medication of aspirin, plavix,  Stop isosorbide dinitrate.  - Follow up with your PCP in a week and cardiologist    Diagnosis: Benign Essential Hypertension  Assessment and Plan of Treatment: You have a history of hypertension.   - Continue your home medication of metoprolol and lisinopril   - Follow up with your PCP in a week.  - Stop taking isosorbide, follow up with PCP and cardiology    Diagnosis: Diabetes mellitus  Assessment and Plan of Treatment: You have history of diabetes mellitus type 2. Your hemoglobin A1c is 7.6.   - Continue your home medication for diabetes type 2.   - Follow up with your PCP in a week.

## 2019-09-11 NOTE — PROGRESS NOTE ADULT - SUBJECTIVE AND OBJECTIVE BOX
Patient is a 68y old  Male who presents with a chief complaint of Intractable pain (12 Sep 2019 12:36)      SUBJECTIVE / OVERNIGHT EVENTS:  Patient was seen and examined in the morning. At the time patient stated improvement of right upper quadrant pain. Around 17:48 patients BP was elevated at 178/100. He was also complaining of severe pain in the right upper quadrant. He described it as the same pain present on admission but now is worsening. He denies fever, chills, nausea, vomiting, diarrhea or constipation. Patient was given isosorbide dinitrate for high blood pressure.         MEDICATIONS  (STANDING):  aspirin enteric coated 81 milliGRAM(s) Oral daily  atorvastatin 80 milliGRAM(s) Oral at bedtime  clopidogrel Tablet 75 milliGRAM(s) Oral daily  dextrose 5%. 1000 milliLiter(s) (50 mL/Hr) IV Continuous <Continuous>  dextrose 50% Injectable 12.5 Gram(s) IV Push once  dextrose 50% Injectable 25 Gram(s) IV Push once  dextrose 50% Injectable 25 Gram(s) IV Push once  insulin lispro (HumaLOG) corrective regimen sliding scale   SubCutaneous three times a day before meals  lisinopril 5 milliGRAM(s) Oral daily  metoprolol tartrate 25 milliGRAM(s) Oral two times a day  sodium chloride 0.9%. 1000 milliLiter(s) (50 mL/Hr) IV Continuous <Continuous>    MEDICATIONS  (PRN):  acetaminophen   Tablet .. 650 milliGRAM(s) Oral every 4 hours PRN Mild Pain (1 - 3)  dextrose 40% Gel 15 Gram(s) Oral once PRN Blood Glucose LESS THAN 70 milliGRAM(s)/deciliter  glucagon  Injectable 1 milliGRAM(s) IntraMuscular once PRN Glucose LESS THAN 70 milligrams/deciliter  morphine  - Injectable 2 milliGRAM(s) IV Push every 4 hours PRN Moderate Pain (4 - 6)  morphine  - Injectable 4 milliGRAM(s) IV Push every 4 hours PRN Severe Pain (7 - 10)      Vital Signs Last 24 Hrs  T(C): 36.5 (12 Sep 2019 04:47), Max: 37.3 (11 Sep 2019 20:27)  T(F): 97.7 (12 Sep 2019 04:47), Max: 99.1 (11 Sep 2019 20:27)  HR: 57 (12 Sep 2019 06:28) (57 - 91)  BP: 112/68 (12 Sep 2019 06:28) (112/68 - 180/98)  BP(mean): --  RR: 17 (12 Sep 2019 04:47) (17 - 18)  SpO2: 97% (12 Sep 2019 04:47) (96% - 97%)  CAPILLARY BLOOD GLUCOSE      POCT Blood Glucose.: 137 mg/dL (12 Sep 2019 12:22)  POCT Blood Glucose.: 113 mg/dL (12 Sep 2019 08:15)  POCT Blood Glucose.: 172 mg/dL (11 Sep 2019 21:29)  POCT Blood Glucose.: 119 mg/dL (11 Sep 2019 16:46)    I&O's Summary    11 Sep 2019 07:01  -  12 Sep 2019 07:00  --------------------------------------------------------  IN: 1140 mL / OUT: 0 mL / NET: 1140 mL    CONSTITUTIONAL: denies fever, chills, fatigue, weakness  HEENT: denies blurred visions, sore throat  SKIN: denies new lesions  CARDIOVASCULAR: denies chest pain, chest pressure, palpitations  RESPIRATORY: denies shortness of breath, sputum production  GASTROINTESTINAL: admits to abdominal pain in RUQ that radiates to back, denies nausea, vomiting, diarrhea  GENITOURINARY: denies dysuria  NEUROLOGICAL: denies numbness, headache, focal weakness  MUSCULOSKELETAL: denies new joint pain, muscle aches  LYMPHATICS: denies enlarged lymph nodes, admits to lower extremity swelling  PSYCHIATRIC: denies recent changes in anxiety, depression    PHYSICAL EXAM:  GENERAL: patient appears well, no acute distress, appropriately interactive  HEAD:  Atraumatic, Normocephalic  EYES: EOMI, PERRLA, conjunctiva and sclera clear  NECK: Supple, No JVD  CHEST/LUNG: Clear to auscultation bilaterally; No wheeze  HEART: Regular rate and rhythm; No murmurs, rubs, or gallops  ABDOMEN:  abdomen is soft, tender to palpation in RUQ, nondistended, normoactive bowel sounds, no palpable masses  EXTREMITIES:  2+ Peripheral Pulses, 2+ edema to b/l LE No clubbing, cyanosis  PSYCH: AAOx3  NEUROLOGY: non-focal      LABS:                        13.9   7.68  )-----------( 269      ( 11 Sep 2019 08:34 )             40.7     09-11    140  |  107  |  14  ----------------------------<  124<H>  3.9   |  28  |  0.97    Ca    8.1<L>      11 Sep 2019 08:34  Phos  3.0     09-11  Mg     2.1     09-11    TPro  6.6  /  Alb  2.8<L>  /  TBili  0.7  /  DBili  .10  /  AST  16  /  ALT  22  /  AlkPhos  78  09-11                  RADIOLOGY & ADDITIONAL TESTS:    Imaging Personally Reviewed:    Consultant(s) Notes Reviewed:      Care Discussed with Consultants/Other Providers:

## 2019-09-11 NOTE — DISCHARGE NOTE PROVIDER - NSDCFUADDAPPT_GEN_ALL_CORE_FT
Follow up with Gastroenterologist closely in 1 week for outpatient endoscopy  follow up with primary doctor in 1 week Follow up with Gastroenterologist closely in 1 week for outpatient endoscopy  follow up with primary doctor in 1 week  Follow up with Cardiologist in 1 week

## 2019-09-11 NOTE — PROGRESS NOTE ADULT - SUBJECTIVE AND OBJECTIVE BOX
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 1000 milliLiter(s) (50 mL/Hr) IV Continuous <Continuous>  dextrose 50% Injectable 12.5 Gram(s) IV Push once  dextrose 50% Injectable 25 Gram(s) IV Push once  dextrose 50% Injectable 25 Gram(s) IV Push once  insulin lispro (HumaLOG) corrective regimen sliding scale   SubCutaneous three times a day before meals  lisinopril 5 milliGRAM(s) Oral daily  metoprolol tartrate 25 milliGRAM(s) Oral two times a day  sodium chloride 0.9%. 1000 milliLiter(s) (50 mL/Hr) IV Continuous <Continuous>    MEDICATIONS  (PRN):  acetaminophen   Tablet .. 650 milliGRAM(s) Oral every 4 hours PRN Mild Pain (1 - 3)  dextrose 40% Gel 15 Gram(s) Oral once PRN Blood Glucose LESS THAN 70 milliGRAM(s)/deciliter  glucagon  Injectable 1 milliGRAM(s) IntraMuscular once PRN Glucose LESS THAN 70 milligrams/deciliter  morphine  - Injectable 2 milliGRAM(s) IV Push every 4 hours PRN Moderate Pain (4 - 6)  morphine  - Injectable 4 milliGRAM(s) IV Push every 4 hours PRN Severe Pain (7 - 10)    Allergies    No Known Allergies    Intolerances      Vital Signs Last 24 Hrs  T(C): 37.1 (11 Sep 2019 04:36), Max: 37.1 (11 Sep 2019 04:36)  T(F): 98.7 (11 Sep 2019 04:36), Max: 98.7 (11 Sep 2019 04:36)  HR: 67 (11 Sep 2019 04:36) (60 - 67)  BP: 123/73 (11 Sep 2019 04:36) (92/58 - 123/75)  BP(mean): --  RR: 17 (11 Sep 2019 04:36) (17 - 18)  SpO2: 99% (11 Sep 2019 04:36) (96% - 99%)  I&O's Summary    10 Sep 2019 07:01  -  11 Sep 2019 07:00  --------------------------------------------------------  IN: 700 mL / OUT: 0 mL / NET: 700 mL        PHYSICAL EXAM:  TELE:   Constitutional: NAD, awake and alert, well-developed  HEENT: Moist Mucous Membranes, Anicteric  Pulmonary: Non-labored, breath sounds are clear bilaterally, No wheezing, rales or rhonchi  Cardiovascular: Regular, S1 and S2, No murmurs, rubs, gallops or clicks  Gastrointestinal: Bowel Sounds present, soft, nontender.   Lymph: No peripheral edema. No lymphadenopathy.  Skin: No visible rashes or ulcers.  Psych:  Mood & affect appropriate  LABS: All Labs Reviewed:                        13.9   7.68  )-----------( 269      ( 11 Sep 2019 08:34 )             40.7                         14.9   8.40  )-----------( 307      ( 10 Sep 2019 08:05 )             43.8                         15.9   9.79  )-----------( 347      ( 09 Sep 2019 14:21 )             47.6     11 Sep 2019 08:34    140    |  107    |  14     ----------------------------<  124    3.9     |  28     |  0.97   10 Sep 2019 08:07    142    |  108    |  12     ----------------------------<  144    3.9     |  27     |  1.10   09 Sep 2019 14:21    141    |  108    |  14     ----------------------------<  215    4.1     |  27     |  1.20     Ca    8.1        11 Sep 2019 08:34  Ca    8.4        10 Sep 2019 08:07  Ca    9.0        09 Sep 2019 14:21  Phos  3.0       11 Sep 2019 08:34  Mg     2.1       11 Sep 2019 08:34    TPro  6.6    /  Alb  2.8    /  TBili  0.7    /  DBili  .10    /  AST  16     /  ALT  22     /  AlkPhos  78     11 Sep 2019 08:34  TPro  7.4    /  Alb  3.2    /  TBili  0.7    /  DBili  x      /  AST  16     /  ALT  26     /  AlkPhos  90     10 Sep 2019 08:07  TPro  8.4    /  Alb  3.7    /  TBili  0.4    /  DBili  x      /  AST  15     /  ALT  28     /  AlkPhos  95     09 Sep 2019 14:21    CARDIAC MARKERS ( 10 Sep 2019 01:45 )  .018 ng/mL / x     / x     / x     / x      CARDIAC MARKERS ( 09 Sep 2019 19:52 )  .018 ng/mL / x     / x     / x     / x      CARDIAC MARKERS ( 09 Sep 2019 14:21 )  <.015 ng/mL / x     / x     / x     / x        < from: Cardiac Cath Lab - Adult (19 @ 10:26) >    [place] [address]  [phone]  Cath Lab Report -- Comprehensive Report  Patient: IVANNA JETT  Study date: 2019  Account number: [id number]  MR number: [id number]  : 1950  Gender: Male  Race: W  Case Physician(s):  FIORDALIZA Mast M.D.  Fellow:  Neeru Mercado M.D.  Referring Physician:  Carlos Helms M.D.  INDICATIONS: Unstable angina - CCS4.  HISTORY: h/o CABG , LIMA to LAD, SVG to OM-1   RCA known to be small, nondominat and  No history of previous myocardial  infarction. The patient has hypertension and medication-treated  dyslipidemia.  PROCEDURE:  --  --  Left coronary angiography.  --  LIMA graft angiography.  --  Ascending aortography.  --  Diagnostic myocardial fractional flow reserve.  --  Sonosite - Diagnostic.  --  Sheath Exchange for Intervention.  --  Intervention on OM1: drug-eluting stent, balloon angioplasty.  TECHNIQUE: The risks and alternatives of the procedures and conscious  sedation were explained to the patient and informed consent was obtained.  Cardiac catheterization performed electively. Coronary intervention  performed electively.  Local anesthetic given. Left radial artery access. Local anesthetic given.  A 6 Fr. X 7cm Prelude Radial Kit was inserted in the vessel, utilizing the  modified Seldinger technique. Right femoral artery access. Local  anesthetic given. A 4fr Sheath [place] was inserted in the vessel,  utilizing the modified Seldinger technique. angiography. The vessel was  injected utilizing a catheter. Left coronary artery angiography. The  vessel was injected utilizing a 4 Fr. JL 3.5 catheter and positioned in  the vessel ostia under fluoroscopic guidance. Angiography was performed in  multiple projections using hand-injection of contrast. Left internal  mammary graft angiography. The vessel was injected utilizing a 4 Fr IM  catheter and positioned in the vessel ostia under fluoroscopic guidance.  Angiography was performed in multiple projections using hand-injection of  contrast. Ascending aortography. A 4 Fr Mod Pig Angled 5 Sideholes  catheter was placed and 30 ml contrast was injected. Imaging was performed  using an [place] projection. Myocardial (fractional) flow reserve in the  lesion in the mid circumflex. The vessel was entered with a 6 Fr. EBU 3.5  Launcher guiding catheter. Flow reserve was measured using a 0.014"  pressure-monitoring Verrata PLUS Wire J Tip guide-wire. Based on the  results of this study, the lesion was judged to be non-significant and no  intervention was performed. Sonosite - Diagnostic. RADIATION EXPOSURE:  34.5 min. A successful drug-eluting stent with balloon angioplasty was  performed on the 99 % lesion in the 1st obtuse marginal. Following  intervention there was an excellent angiographic appearance with a 1 %  residual stenosis. Vessel setup was performed. A 6 Fr. EBU 3.5 Launcher  guiding catheter was used to intubate the vessel. Vessel setup was  performed. A 190cm BMW UniversalII wire was used to cross the lesion.  Vessel setup was performed. A 190cm BMW Universal II wire was used to  cross the lesion. Vessel setup was performed. A Asahi Jim Blue 180cm wire  was used to cross the lesion. Vessel setup was performed. A Asahi  Extension wire was used to cross the lesion. Balloon angioplasty was  performed, using a 2.0 X 15 Sprinter OTW balloon, with 2 inflations and a  maximum inflation pressure of 12 polina. A 3.5 X 38 Resolute Lobo  drug-eluting stent was placed across the lesion and deployed at a maximum  inflation pressure of 12 polina. Sheath Exchange for Intervention.  CONTRAST GIVEN: 180 ml Optiray. 75 ml Optiray.  MEDICATIONS GIVEN: Midazolam, 1 mg, IV. Fentanyl, 25 mcg, IV. Nicardipine  (Cardene), 200 mcg, intracoronary. Nitroglycerin, 100 mcg, intracoronary.  Heparin, 5000 units, IV. Heparin, 3000 units, IV. Heparin, 5000 units, IV.  2% Lidocaine, 3 ml, subcutaneously. Cardene, 400 mcg. 0.9% Normal saline,  250 ml, IV. 2% Lidocaine, 10 ml, subcutaneously. 0.9% Normal saline, 150  ml, IV.  VENTRICLES: No left ventriculogram was performed.  CORONARY VESSELS: The coronary circulation is left dominant.  LM:   --  LM: The left anterior descending and circumflex arteries arose  anomalously from separate ostia.  LAD:   --  LAD: The distal vessel was supplied by extensive retrograde flow  from the graft(s) to the mid LAD.  --  Proximal LAD: There was a tubular 80 % stenosis at a site with no prior  intervention. The lesion was eccentric. There was ANASTASIA grade 2 flow  through the vessel (partial perfusion).  CX:   --  Mid circumflex: There was a discrete 40 % stenosis at a site with  no prior intervention. The lesion was eccentric. There was ANASTASIA grade 3  flow through the vessel (brisk flow). iFR negative 0.98  --  OM1: There was a diffuse 99 % stenosis at a site with no prior  intervention. The lesion was eccentric. There was ANASTASIA grade 2 flow  through the vessel (partial perfusion) and a large vascular territory  distal to the lesion. This is a likelyculprit for the patient's clinical  presentation. An intervention was performed.  RCA:   --  RCA: This vessel was not injected.  GRAFTS:   --  Graft to the LAD: The graft was a medium sized LIMA. Distal  vessel angiography showed mild diffuse disease.  AORTA: Ascending aorta: Normal. The segment was moderately dilated.  COMPLICATIONS: There were no complications.  DIAGNOSTIC IMPRESSIONS: Successful PCI to severe stenosis of OM-1 using  3.5mm Lobo GUY  Patent LIMA to LAD  DIAGNOSTIC RECOMMENDATIONS: DAPT x 12 months  Aggressive risk factor modification  INTERVENTIONAL IMPRESSIONS: Successful PCI to severe stenosis of OM-1 using  3.5mm Lobo GUY  Patent LIMA to LAD  INTERVENTIONAL RECOMMENDATIONS: DAPT x 12 months  Aggressive risk factor modification  Prepared and signed by  Sandee Corrales M.D.  Signed 2019 14:01:09  HEMODYNAMIC TABLES  Pressures:  Baseline  Pressures:  - HR: 52  Pressures:  - Rhythm:  Pressures:  -- Aortic Pressure (S/D/M): 101/65/80  Pressures:  Intervention  Pressures:  - HR: 50  Pressures:  - Rhythm:  Pressures:  -- Aortic Pressure (S/D/M): 117/66/86  Outputs:  Baseline  Outputs:  -- CALCULATIONS: Age in years: 68.35  Outputs:  -- CALCULATIONS: Body Surface Area: 2.41  Outputs:  -- CALCULATIONS: Height in cm: 180.00  Outputs:  -- CALCULATIONS: Sex: Male  Outputs:  -- CALCULATIONS: Weight in k.70  Outputs:  -- OUTPUTS: O2 consumption: 301.36  Outputs:  -- OUTPUTS: Vo2 Indexed: 125.00  Outputs:  Intervention  Outputs:  -- OUTPUTS: O2 consumption: 301.36  Outputs:  -- OUTPUTS: Vo2 Indexed: 125.00    < end of copied text >    < from: CT Angio Chest w/ IV Cont (19 @ 16:12) >    EXAM:  CT ANGIO CHEST (W)AW IC                          EXAM:  CT ANGIO ABD PELV (W)AW IC                            PROCEDURE DATE:  2019          INTERPRETATION:  CLINICAL INFORMATION: Chest pain radiating to back    COMPARISON: CT chest 5/10/2015    PROCEDURE:   CT Angiography of the Chest, Abdomen and Pelvis.   Precontrast imaging was performed through the chest followed by arterial   phase imaging of the chest, abdomen and pelvis.  Intravenous contrast: 90 ml Omnipaque 350. 10 ml discarded.  Oral contrast: None.  Sagittal and coronal reformats were performed as well as 3D (MIP)   reconstructions.    FINDINGS:    CHEST:     LUNGS AND LARGE AIRWAYS: Patent central airways. No pulmonary nodules.  PLEURA: No pleural effusion.  VESSELS: Prior valve replacement.  Atherosclerotic changes of the aorta   and coronary vasculature. No aortic aneurysm. No evidence of aortic   dissection. No periaortic hematoma. Prior CABG.  HEART: Heart size is normal. No pericardial effusion.  MEDIASTINUM AND SADIA: No lymphadenopathy.  CHEST WALL AND LOWER NECK: Within normal limits.    ABDOMEN AND PELVIS:    LIVER: Hepatomegaly with hepatic steatosis. No focal hepatic masses.  BILE DUCTS: Normal caliber.  GALLBLADDER: Question minimal gallbladder wall edema. No definite   radiopaque gallstones identified.  SPLEEN: Within normal limits.  PANCREAS: Question minimal edema of the pancreatic head adjacent minimal   infiltrative changes may be secondary to fatty replacement however   correlate with amylase lipase levels to exclude early acute pancreatitis.  ADRENALS: Within normal limits.  KIDNEYS/URETERS: Within normal limits.    BLADDER: Within normal limits.  REPRODUCTIVE ORGANS: Within normal limits.    BOWEL: No bowel obstruction. Appendix is within normal limits. Colonic   diverticulosis..  PERITONEUM: No ascites.  VESSELS: Atherosclerotic changes of the aorta. No aortic dissection. No   aortic aneurysm. No perinephric hematoma.  RETROPERITONEUM/LYMPH NODES: No lymphadenopathy.    ABDOMINAL WALL: Within normal limits.  BONES: Degenerative changes. Prior sternotomy.     IMPRESSION:     Question minimal edema of the pancreatic head adjacent minimal   infiltrative changes may be secondary to fatty replacement however   correlate with amylase lipase levels to exclude early acute pancreatitis.     Minimal gallbladder wall edema without radiopaque gallstones.    No evidence of aortic dissection. No aortic aneurysm.    Hepatomegaly with hepatic steatosis.    JANET RUTHERFORD M.D.,ATTENDING RADIOLOGIST  This document has been electronically signed. Sep  9 2019  4:25PM     < end of copied text >    < from: 12 Lead ECG (19 @ 14:07) >    Ventricular Rate 67 BPM    Atrial Rate 67 BPM    P-R Interval 216 ms    QRS Duration 114 ms    Q-T Interval 430 ms    QTC Calculation(Bezet) 454 ms    R Axis 221 degrees    T Axis 76 degrees    Diagnosis Line *** Suspect arm lead reversal, interpretation assumes no reversal  Normal sinus rhythm  Poor R wave progression  Confirmed by MILAN ELLIS ([id number]) on 9/10/2019 12:46:10 PM    < end of copied text > [facility] -- Alee Mike, Maria A Garcia, Jayant Burrell Savella  Office # [phone]    Follow Up:  CAD s/p PCI, Preop Eval    Subjective/Observations: Seen and evaluated, denies N/V.  Admits to have abdominal pain significantly improved.  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 1000 milliLiter(s) (50 mL/Hr) IV Continuous <Continuous>  dextrose 50% Injectable 12.5 Gram(s) IV Push once  dextrose 50% Injectable 25 Gram(s) IV Push once  dextrose 50% Injectable 25 Gram(s) IV Push once  insulin lispro (HumaLOG) corrective regimen sliding scale   SubCutaneous three times a day before meals  lisinopril 5 milliGRAM(s) Oral daily  metoprolol tartrate 25 milliGRAM(s) Oral two times a day  sodium chloride 0.9%. 1000 milliLiter(s) (50 mL/Hr) IV Continuous <Continuous>    MEDICATIONS  (PRN):  acetaminophen   Tablet .. 650 milliGRAM(s) Oral every 4 hours PRN Mild Pain (1 - 3)  dextrose 40% Gel 15 Gram(s) Oral once PRN Blood Glucose LESS THAN 70 milliGRAM(s)/deciliter  glucagon  Injectable 1 milliGRAM(s) IntraMuscular once PRN Glucose LESS THAN 70 milligrams/deciliter  morphine  - Injectable 2 milliGRAM(s) IV Push every 4 hours PRN Moderate Pain (4 - 6)  morphine  - Injectable 4 milliGRAM(s) IV Push every 4 hours PRN Severe Pain (7 - 10)    Allergies    No Known Allergies    Intolerances    Vital Signs Last 24 Hrs  T(C): 37.1 (11 Sep 2019 04:36), Max: 37.1 (11 Sep 2019 04:36)  T(F): 98.7 (11 Sep 2019 04:36), Max: 98.7 (11 Sep 2019 04:36)  HR: 67 (11 Sep 2019 04:36) (60 - 67)  BP: 123/73 (11 Sep 2019 04:36) (92/58 - 123/75)  BP(mean): --  RR: 17 (11 Sep 2019 04:36) (17 - 18)  SpO2: 99% (11 Sep 2019 04:36) (96% - 99%)  I&O's Summary    10 Sep 2019 07:01  -  11 Sep 2019 07:00  --------------------------------------------------------  IN: 700 mL / OUT: 0 mL / NET: 700 mL    PHYSICAL EXAM:  TELE: Not on tele  Constitutional: NAD, awake and alert, obese  HEENT: Moist Mucous Membranes, Anicteric  Pulmonary: Non-labored, breath sounds are clear but diminished bilaterally, No wheezing, rales or rhonchi  Cardiovascular: Regular, S1 and S2, + murmurs.  No rubs, gallops or clicks  Gastrointestinal: Bowel Sounds present, soft, nontender.   Lymph: Chronic LLE edema. No lymphadenopathy.  Skin: No visible rashes or ulcers.  Psych:  Mood & affect appropriate  LABS: All Labs Reviewed:                        13.9   7.68  )-----------( 269      ( 11 Sep 2019 08:34 )             40.7                         14.9   8.40  )-----------( 307      ( 10 Sep 2019 08:05 )             43.8                         15.9   9.79  )-----------( 347      ( 09 Sep 2019 14:21 )             47.6     11 Sep 2019 08:34    140    |  107    |  14     ----------------------------<  124    3.9     |  28     |  0.97   10 Sep 2019 08:07    142    |  108    |  12     ----------------------------<  144    3.9     |  27     |  1.10   09 Sep 2019 14:21    141    |  108    |  14     ----------------------------<  215    4.1     |  27     |  1.20     Ca    8.1        11 Sep 2019 08:34  Ca    8.4        10 Sep 2019 08:07  Ca    9.0        09 Sep 2019 14:21  Phos  3.0       11 Sep 2019 08:34  Mg     2.1       11 Sep 2019 08:34    TPro  6.6    /  Alb  2.8    /  TBili  0.7    /  DBili  .10    /  AST  16     /  ALT  22     /  AlkPhos  78     11 Sep 2019 08:34  TPro  7.4    /  Alb  3.2    /  TBili  0.7    /  DBili  x      /  AST  16     /  ALT  26     /  AlkPhos  90     10 Sep 2019 08:07  TPro  8.4    /  Alb  3.7    /  TBili  0.4    /  DBili  x      /  AST  15     /  ALT  28     /  AlkPhos  95     09 Sep 2019 14:21    CARDIAC MARKERS ( 10 Sep 2019 01:45 )  .018 ng/mL / x     / x     / x     / x      CARDIAC MARKERS ( 09 Sep 2019 19:52 )  .018 ng/mL / x     / x     / x     / x      CARDIAC MARKERS ( 09 Sep 2019 14:21 )  <.015 ng/mL / x     / x     / x     / x        < from: Cardiac Cath Lab - Adult (19 @ 10:26) >    [place] [address]  [phone]  Cath Lab Report -- Comprehensive Report  Patient: IVANNA JETT  Study date: 2019  Account number: [id number]  MR number: [id number]  : 1950  Gender: Male  Race: W  Case Physician(s):  FIORDALIZA Mast M.D.  Fellow:  Neeru Mercado M.D.  Referring Physician:  Carlos Helms M.D.  INDICATIONS: Unstable angina - CCS4.  HISTORY: h/o CABG , LIMA to LAD, SVG to OM-1   RCA known to be small, nondominat and  No history of previous myocardial  infarction. The patient has hypertension and medication-treated  dyslipidemia.  PROCEDURE:  --  --  Left coronary angiography.  --  LIMA graft angiography.  --  Ascending aortography.  --  Diagnostic myocardial fractional flow reserve.  --  Sonosite - Diagnostic.  --  Sheath Exchange for Intervention.  --  Intervention on OM1: drug-eluting stent, balloon angioplasty.  TECHNIQUE: The risks and alternatives of the procedures and conscious  sedation were explained to the patient and informed consent was obtained.  Cardiac catheterization performed electively. Coronary intervention  performed electively.  Local anesthetic given. Left radial artery access. Local anesthetic given.  A 6 Fr. X 7cm Prelude Radial Kit was inserted in the vessel, utilizing the  modified Seldinger technique. Right femoral artery access. Local  anesthetic given. A 4fr Sheath [place] was inserted in the vessel,  utilizing the modified Seldinger technique. angiography. The vessel was  injected utilizing a catheter. Left coronary artery angiography. The  vessel was injected utilizing a 4 Fr. JL 3.5 catheter and positioned in  the vessel ostia under fluoroscopic guidance. Angiography was performed in  multiple projections using hand-injection of contrast. Left internal  mammary graft angiography. The vessel was injected utilizing a 4 Fr IM  catheter and positioned in the vessel ostia under fluoroscopic guidance.  Angiography was performed in multiple projections using hand-injection of  contrast. Ascending aortography. A 4 Fr Mod Pig Angled 5 Sideholes  catheter was placed and 30 ml contrast was injected. Imaging was performed  using an [place] projection. Myocardial (fractional) flow reserve in the  lesion in the mid circumflex. The vessel was entered with a 6 Fr. EBU 3.5  Launcher guiding catheter. Flow reserve was measured using a 0.014"  pressure-monitoring Verrata PLUS Wire J Tip guide-wire. Based on the  results of this study, the lesion was judged to be non-significant and no  intervention was performed. Sonosite - Diagnostic. RADIATION EXPOSURE:  34.5 min. A successful drug-eluting stent with balloon angioplasty was  performed on the 99 % lesion in the 1st obtuse marginal. Following  intervention there was an excellent angiographic appearance with a 1 %  residual stenosis. Vessel setup was performed. A 6 Fr. EBU 3.5 Launcher  guiding catheter was used to intubate the vessel. Vessel setup was  performed. A 190cm BMW UniversalII wire was used to cross the lesion.  Vessel setup was performed. A 190cm BMW Universal II wire was used to  cross the lesion. Vessel setup was performed. A Sympoz (dba Craftsy) Jim Blue 180cm wire  was used to cross the lesion. Vessel setup was performed. A Sympoz (dba Craftsy)  Extension wire was used to cross the lesion. Balloon angioplasty was  performed, using a 2.0 X 15 Sprinter OTW balloon, with 2 inflations and a  maximum inflation pressure of 12 polina. A 3.5 X 38 Resolute Lobo  drug-eluting stent was placed across the lesion and deployed at a maximum  inflation pressure of 12 polina. Sheath Exchange for Intervention.  CONTRAST GIVEN: 180 ml Optiray. 75 ml Optiray.  MEDICATIONS GIVEN: Midazolam, 1 mg, IV. Fentanyl, 25 mcg, IV. Nicardipine  (Cardene), 200 mcg, intracoronary. Nitroglycerin, 100 mcg, intracoronary.  Heparin, 5000 units, IV. Heparin, 3000 units, IV. Heparin, 5000 units, IV.  2% Lidocaine, 3 ml, subcutaneously. Cardene, 400 mcg. 0.9% Normal saline,  250 ml, IV. 2% Lidocaine, 10 ml, subcutaneously. 0.9% Normal saline, 150  ml, IV.  VENTRICLES: No left ventriculogram was performed.  CORONARY VESSELS: The coronary circulation is left dominant.  LM:   --  LM: The left anterior descending and circumflex arteries arose  anomalously from separate ostia.  LAD:   --  LAD: The distal vessel was supplied by extensive retrograde flow  from the graft(s) to the mid LAD.  --  Proximal LAD: There was a tubular 80 % stenosis at a site with no prior  intervention. The lesion was eccentric. There was ANASTASIA grade 2 flow  through the vessel (partial perfusion).  CX:   --  Mid circumflex: There was a discrete 40 % stenosis at a site with  no prior intervention. The lesion was eccentric. There was ANASTASIA grade 3  flow through the vessel (brisk flow). iFR negative 0.98  --  OM1: There was a diffuse 99 % stenosis at a site with no prior  intervention. The lesion was eccentric. There was ANASTASIA grade 2 flow  through the vessel (partial perfusion) and a large vascular territory  distal to the lesion. This is a likelyculprit for the patient's clinical  presentation. An intervention was performed.  RCA:   --  RCA: This vessel was not injected.  GRAFTS:   --  Graft to the LAD: The graft was a medium sized LIMA. Distal  vessel angiography showed mild diffuse disease.  AORTA: Ascending aorta: Normal. The segment was moderately dilated.  COMPLICATIONS: There were no complications.  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  Signed 2019 14:01:09  HEMODYNAMIC TABLES  Pressures:  Baseline  Pressures:  - HR: 52  Pressures:  - Rhythm:  Pressures:  -- Aortic Pressure (S/D/M): 101/65/80  Pressures:  Intervention  Pressures:  - HR: 50  Pressures:  - Rhythm:  Pressures:  -- Aortic Pressure (S/D/M): 117/66/86  Outputs:  Baseline  Outputs:  -- CALCULATIONS[age]  Outputs:  -- CALCULATIONS: Body Surface Area: 2.41  Outputs:  -- CALCULATIONS: Height in cm: 180.00  Outputs:  -- CALCULATIONS: Sex: Male  Outputs:  -- CALCULATIONS: Weight in k.70  Outputs:  -- OUTPUTS: O2 consumption: 301.36  Outputs:  -- OUTPUTS: Vo2 Indexed: 125.00  Outputs:  Intervention  Outputs:  -- OUTPUTS: O2 consumption: 301.36  Outputs:  -- OUTPUTS: Vo2 Indexed: 125.00    < end of copied text >    < from: CT Angio Chest w/ IV Cont (19 @ 16:12) >    EXAM:  CT ANGIO CHEST (W)AW IC                          EXAM:  CT ANGIO ABD PELV (W)AW IC                            PROCEDURE DATE:  2019          INTERPRETATION:  CLINICAL INFORMATION: Chest pain radiating to back    COMPARISON: CT chest 5/10/2015    PROCEDURE:   CT Angiography of the Chest, Abdomen and Pelvis.   Precontrast imaging was performed through the chest followed by arterial   phase imaging of the chest, abdomen and pelvis.  Intravenous contrast: 90 ml Omnipaque 350. 10 ml discarded.  Oral contrast: None.  Sagittal and coronal reformats were performed as well as 3D (MIP)   reconstructions.    FINDINGS:    CHEST:     LUNGS AND LARGE AIRWAYS: Patent central airways. No pulmonary nodules.  PLEURA: No pleural effusion.  VESSELS: Prior valve replacement.  Atherosclerotic changes of the aorta   and coronary vasculature. No aortic aneurysm. No evidence of aortic   dissection. No periaortic hematoma. Prior CABG.  HEART: Heart size is normal. No pericardial effusion.  MEDIASTINUM AND SADIA: No lymphadenopathy.  CHEST WALL AND LOWER NECK: Within normal limits.    ABDOMEN AND PELVIS:    LIVER: Hepatomegaly with hepatic steatosis. No focal hepatic masses.  BILE DUCTS: Normal caliber.  GALLBLADDER: Question minimal gallbladder wall edema. No definite   radiopaque gallstones identified.  SPLEEN: Within normal limits.  PANCREAS: Question minimal edema of the pancreatic head adjacent minimal   infiltrative changes may be secondary to fatty replacement however   correlate with amylase lipase levels to exclude early acute pancreatitis.  ADRENALS: Within normal limits.  KIDNEYS/URETERS: Within normal limits.    BLADDER: Within normal limits.  REPRODUCTIVE ORGANS: Within normal limits.    BOWEL: No bowel obstruction. Appendix is within normal limits. Colonic   diverticulosis..  PERITONEUM: No ascites.  VESSELS: Atherosclerotic changes of the aorta. No aortic dissection. No   aortic aneurysm. No perinephric hematoma.  RETROPERITONEUM/LYMPH NODES: No lymphadenopathy.    ABDOMINAL WALL: Within normal limits.  BONES: Degenerative changes. Prior sternotomy.     IMPRESSION:     Question minimal edema of the pancreatic head adjacent minimal   infiltrative changes may be secondary to fatty replacement however   correlate with amylase lipase levels to exclude early acute pancreatitis.     Minimal gallbladder wall edema without radiopaque gallstones.    No evidence of aortic dissection. No aortic aneurysm.    Hepatomegaly with hepatic steatosis.    JNAET RUTHERFORD M.D.,ATTENDING RADIOLOGIST  This document has been electronically signed. Sep  9 2019  4:25PM     < end of copied text >    < from: 12 Lead ECG (19 @ 14:07) >    Ventricular Rate 67 BPM    Atrial Rate 67 BPM    P-R Interval 216 ms    QRS Duration 114 ms    Q-T Interval 430 ms    QTC Calculation(Bezet) 454 ms    R Axis 221 degrees    T Axis 76 degrees    Diagnosis Line *** Suspect arm lead reversal, interpretation assumes no reversal  Normal sinus rhythm  Poor R wave progression  Confirmed by MILAN ELLIS ([id number]) on 9/10/2019 12:46:10 PM    < end of copied text >

## 2019-09-11 NOTE — CHART NOTE - NSCHARTNOTEFT_GEN_A_CORE
Reports resolution of abd pain, wants to go home  Tolerated low fat diet with breakfast and lunch, no n/v,  minimal RUQ tenderness, nondistended  lft wnl, bun/creatinine wnl, lipase negative x 2  Ct and MRI with mild acute pancreatitis and possible duodenitis  HIDA negative        Idiopathic Pancreatitis: no stones, lft wnl, denies alc use, triglycerides not remarkably elevated,   IGG, MARCIA pending, outpatient f/u  discussed with GI stable for discharge home with outpatient EGD +/- EUS as outpatient, pt aware  continue cardiac meds  discharge home today with outpatient cards, GI, PCP f/u  spent 40 min on discharge process time

## 2019-09-11 NOTE — PROGRESS NOTE ADULT - PROBLEM SELECTOR PLAN 5
- Chronic, elevated since presentation, may be 2/2 pain, today elevated at 178/100  - 1 dose of isosorbide dinitrate orders, cont to monitor BP   - Continue home lisinopril 5mg PO qd  - Continue home metoprolol 25mg PO BID

## 2019-09-11 NOTE — DISCHARGE NOTE PROVIDER - CARE PROVIDER_API CALL
Enrique Ruiz (DO)  Family Medicine  [address] [facility] [address], [address]  [place] [address]  Phone: [phone]  Fax: [phone]  Follow Up Time: 1 week    Davion Grossman (DO)  Gastroenterology; Internal Medicine  [address] [place] [id number]  Phone: [phone]  Fax: [phone]  Follow Up Time: 1 week

## 2019-09-11 NOTE — PROGRESS NOTE ADULT - PROBLEM SELECTOR PLAN 7
IMPROVE VTE Individual Risk Assessment        RISK                                                          Points  [  ] Previous VTE                                                3  [  ] Thrombophilia                                             2  [  ] Lower limb paralysis                                   2        (unable to hold up >15 seconds)    [  ] Current Cancer                                             2         (within 6 months)  [  ] Immobilization > 24 hrs                              1  [  ] ICU/CCU stay > 24 hours                             1  [ 1 ] Age > 60                                                         1    IMPROVE VTE Score: 1. SCDs, ambulate as tolerated
IMPROVE VTE Individual Risk Assessment        RISK                                                          Points  [  ] Previous VTE                                                3  [  ] Thrombophilia                                             2  [  ] Lower limb paralysis                                   2        (unable to hold up >15 seconds)    [  ] Current Cancer                                             2         (within 6 months)  [  ] Immobilization > 24 hrs                              1  [  ] ICU/CCU stay > 24 hours                             1  [ 1 ] Age > 60                                                         1    IMPROVE VTE Score: 1. SCDs, ambulate as tolerated

## 2019-09-11 NOTE — CHART NOTE - NSCHARTNOTEFT_GEN_A_CORE
Called by RN to evaluate patient with pain. Patient experiencing severe pain in right upper quadrant. Pain was present on admission and got slightly better but is now worsening again. Most likely due to pancreatitis. Patient written for morphine, however he feels it does not work for him. He doesn't know of any pain medications that have worked in the past. Will give oxycodone 10mg now and then reevaluate.     Vital Signs Last 24 Hrs  T(C): 36.9 (11 Sep 2019 13:26), Max: 37.1 (11 Sep 2019 04:36)  T(F): 98.4 (11 Sep 2019 13:26), Max: 98.7 (11 Sep 2019 04:36)  HR: 58 (11 Sep 2019 13:26) (58 - 67)  BP: 180/98 (11 Sep 2019 18:42) (123/73 - 180/98)  BP(mean): --  RR: 17 (11 Sep 2019 13:26) (17 - 17)  SpO2: 98% (11 Sep 2019 13:26) (98% - 99%) Called by RN to evaluate patient with pain. Patient experiencing severe pain in right upper quadrant. Pain was present on admission and got slightly better but is now worsening again. Most likely due to pancreatitis. Patient written for morphine, however he feels it does not work for him. He doesn't know of any pain medications that have worked in the past. Will give oxycodone 10mg now and then reevaluate.     Vital Signs Last 24 Hrs  T(C): 36.9 (11 Sep 2019 13:26), Max: 37.1 (11 Sep 2019 04:36)  T(F): 98.4 (11 Sep 2019 13:26), Max: 98.7 (11 Sep 2019 04:36)  HR: 58 (11 Sep 2019 13:26) (58 - 67)  BP: 180/98 (11 Sep 2019 18:42) (123/73 - 180/98)  BP(mean): --  RR: 17 (11 Sep 2019 13:26) (17 - 17)  SpO2: 98% (11 Sep 2019 13:26) (98% - 99%)      Physical Exam:  Gen: well appearing, NAD  Cardio: regular rate and rhythm, +s1s2, no murmurs, rubs, or gallops  Pulm: CTA b/l, no wheezes, rales or rhonchi  Abdomen: soft, tenderness in right upper quadrant, nondistended, +BS x4 quadrants, no guarding

## 2019-09-11 NOTE — PROGRESS NOTE ADULT - PROBLEM SELECTOR PLAN 3
- Chronic, stable   - S/p CABG x2 (8451-5635), s/p stent in Jan 2019  - Continue home ASA 81mg PO qd  - Continue home Plavix 75mg PO qd  - Continue home isosorbide dinitrate 30mg PO qd

## 2019-09-11 NOTE — PROGRESS NOTE ADULT - SUBJECTIVE AND OBJECTIVE BOX
pt seen  feeling better  MRCP yesterday  ICU Vital Signs Last 24 Hrs  T(C): 37.1 (11 Sep 2019 04:36), Max: 37.1 (11 Sep 2019 04:36)  T(F): 98.7 (11 Sep 2019 04:36), Max: 98.7 (11 Sep 2019 04:36)  HR: 67 (11 Sep 2019 04:36) (60 - 67)  BP: 123/73 (11 Sep 2019 04:36) (92/58 - 123/75)  BP(mean): --  ABP: --  ABP(mean): --  RR: 17 (11 Sep 2019 04:36) (17 - 18)  SpO2: 99% (11 Sep 2019 04:36) (96% - 99%)  gen-NAD  resp-clear  abd-soft mild RUQ tenderness                        13.9   7.68  )-----------( 269      ( 11 Sep 2019 08:34 )             40.7

## 2019-09-11 NOTE — DISCHARGE NOTE PROVIDER - HOSPITAL COURSE
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 Pt states he was riding on his motorcycle when he started to feel "funny" - didn't eat much for the rest of the day, pt woke up this AM with "achy" RUQ abdominal pain radiating straight through to his back. He rates the pain as 3/10 at rest, 7/10 with inspiration. Pt also admits to some SOB. He states as the day has progressed he feels the pain is under his rib cage. It is reproducible with palpation.  He reports one other similar episode ~12 years back and said he never found out the cause of the pain. He states it does not feel like his prior cardiac events.  Pt also admits to increased urinary frequency for the past few nights. Denies dysuria, hesitancy, hematuria. Patient also denies headache, changes in vision, dizziness, fevers, chills, chest pain, MICHELE, N/V/D/C.          In the ED, VS T 98.1F, HR 76, /103, RR 16 SpO2 97% on RA, lipase WNL, trops negative x2    Pt received labetalol 10mg IVP x1, Toprol XL 25mg PO x1     EKG: Sinus rhythm with 1st degree AV block, left anterior fascicular block, min voltage criteria for LVH (may be normal variant), anteroseptal infarct, age undetermined     CXR: No active pulmonary disease     HIDA scan: Normal hepatobiliary scan, no radionuclide evidence of acute cholecystitis    US gallbladder: No sonographic evidence of gallstones, borderline gallbladder wall thickening, nonspecific in nature, incidental note of hepatic steatosis    CT angio chest/ CT angio abdomen pelvis: Question minimal edema of the pancreatic head adjacent minimal infiltrative changes may be secondary to fatty replacement however correlate with amylase lipase levels to exclude early acute pancreatitis. Minimal gallbladder wall edema without radiopaque gallstones. No evidence of aortic dissection. No aortic aneurysm. Hepatomegaly with hepatic steatosis. (09 Sep 2019 21:33)            ---    HOSPITAL COURSE:     69 y/o male with PMHx HTN, HLD, DM, CAD, MI s/p CABG x2 ([id number]) s/p stent placement January 2019, valve replacement (unable to recall which one), traumatic amputation of right 1st and 2nd digits, diverticulitis, Scarlet fever presents to ED with RUQ abdominal pain and pain with inspiration of 1 days duration. Patient was admitted for abdominal pain secondary to acute pancreatitis. Ct angio showed questionable minimal edema of the pancreatic head. HIDA was performed and was negative for cholecystitis, aortic aneurysm and aortic dissection. Patients pain was not cardiac in natures, trops were negative x2. Lipase values were within normal limits at 81. MRCP was performed showing mild acute uncomplicated pancreatitis of indeterminate etiology with trace pericholecystic fluid. There were no biliary dilatation or common duct stone. Surgery was consulted, Dr. Clark, suggesting no acute surgical intervention needed. GI was consulted, Dr. Grossman, suggesting patient follow up outpatient to complete EGD to rule out possible peptic ulcer disease.         Patient improved clinically throughout hospital course. Patient seen and examined on day of discharge.        Vital Signs Last 24 Hrs    T(C): 37.1 (11 Sep 2019 04:36), Max: 37.1 (11 Sep 2019 04:36)    T(F): 98.7 (11 Sep 2019 04:36), Max: 98.7 (11 Sep 2019 04:36)    HR: 67 (11 Sep 2019 04:36) (60 - 67)    BP: 123/73 (11 Sep 2019 04:36) (92/58 - 123/75)    BP(mean): --    RR: 17 (11 Sep 2019 04:36) (17 - 18)    SpO2: 99% (11 Sep 2019 04:36) (96% - 99%)        Physical Exam:    General: Well developed, well nourished, in no acute distress, appropriately interactive    HEENT: NCAT, PERRLA, EOMI bl, moist mucous membranes     Neck: Supple, nontender    Neurology: A&Ox3, nonfocal, CN II-XII grossly intact, sensation intact, no gait abnormalities     Respiratory: CTA B/L, No wheezing, rales, or rhonchi    CV: RRR, S1/S2 present, no murmurs, rubs, or gallops, + chronic 2+ pitting edema of b/l LE    Abdominal: Soft, nontender, non-distended, normoactive bowel sounds    Extremities: No C/C/E, peripheral pulses present    MSK: No clubbing or cyanosis, no obvious deformity    Skin: warm, dry, normal color, no obvious rash         Patient is medically stable for discharge to ____ with outpatient follow up.        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 Pt states he was riding on his motorcycle when he started to feel "funny" - didn't eat much for the rest of the day, pt woke up this AM with "achy" RUQ abdominal pain radiating straight through to his back. He rates the pain as 3/10 at rest, 7/10 with inspiration. Pt also admits to some SOB. He states as the day has progressed he feels the pain is under his rib cage. It is reproducible with palpation.  He reports one other similar episode ~12 years back and said he never found out the cause of the pain. He states it does not feel like his prior cardiac events.  Pt also admits to increased urinary frequency for the past few nights. Denies dysuria, hesitancy, hematuria. Patient also denies headache, changes in vision, dizziness, fevers, chills, chest pain, MICHELE, N/V/D/C.          In the ED, VS T 98.1F, HR 76, /103, RR 16 SpO2 97% on RA, lipase WNL, trops negative x2    Pt received labetalol 10mg IVP x1, Toprol XL 25mg PO x1     EKG: Sinus rhythm with 1st degree AV block, left anterior fascicular block, min voltage criteria for LVH (may be normal variant), anteroseptal infarct, age undetermined     CXR: No active pulmonary disease     HIDA scan: Normal hepatobiliary scan, no radionuclide evidence of acute cholecystitis    US gallbladder: No sonographic evidence of gallstones, borderline gallbladder wall thickening, nonspecific in nature, incidental note of hepatic steatosis    CT angio chest/ CT angio abdomen pelvis: Question minimal edema of the pancreatic head adjacent minimal infiltrative changes may be secondary to fatty replacement however correlate with amylase lipase levels to exclude early acute pancreatitis. Minimal gallbladder wall edema without radiopaque gallstones. No evidence of aortic dissection. No aortic aneurysm. Hepatomegaly with hepatic steatosis. (09 Sep 2019 21:33)            ---    HOSPITAL COURSE:     69 y/o male with PMHx HTN, HLD, DM, CAD, MI s/p CABG x2 ([id number]) s/p stent placement January 2019, valve replacement (unable to recall which one), traumatic amputation of right 1st and 2nd digits, diverticulitis, Scarlet fever presents to ED with RUQ abdominal pain and pain with inspiration of 1 days duration. Patient was admitted for abdominal pain secondary to acute pancreatitis. Ct angio showed questionable minimal edema of the pancreatic head. HIDA was performed and was negative for cholecystitis, aortic aneurysm and aortic dissection. Patients pain was not cardiac in nature, trops were negative x2. Lipase values were within normal limits at 81. MRCP was performed showing mild acute uncomplicated pancreatitis of indeterminate etiology with trace pericholecystic fluid. There were no biliary dilatation or common duct stone. Surgery was consulted, Dr. Clark, suggesting no acute surgical intervention needed. GI was consulted, Dr. Grossman, suggesting patient follow up outpatient to complete EGD with EUS to rule out possible peptic ulcer disease vs malignancy.    Patient improved clinically throughout hospital course. Patient seen and examined on day of discharge.        T(C): 36.9 (09-11-19 @ 13:26), Max: 37.1 (09-11-19 @ 04:36)    HR: 58 (09-11-19 @ 13:26) (58 - 67)    BP: 150/82 (09-11-19 @ 13:26) (95/59 - 150/82)    RR: 17 (09-11-19 @ 13:26) (17 - 17)    SpO2: 98% (09-11-19 @ 13:26) (98% - 99%)    Wt(kg): --        Physical Exam:    General: Well developed, well nourished, in no acute distress, appropriately interactive    HEENT: NCAT, PERRLA, EOMI bl, moist mucous membranes     Neck: Supple, nontender    Neurology: A&Ox3, nonfocal, CN II-XII grossly intact, sensation intact, no gait abnormalities     Respiratory: CTA B/L, No wheezing, rales, or rhonchi    CV: RRR, S1/S2 present, no murmurs, rubs, or gallops, + chronic 2+ pitting edema of b/l LE    Abdominal: Soft, nontender, non-distended, normoactive bowel sounds    Extremities: No C/C/E, peripheral pulses present    MSK: No clubbing or cyanosis, no obvious deformity    Skin: warm, dry, normal color, no obvious rash         Patient is medically stable for discharge to home with outpatient follow up.        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 Pt states he was riding on his motorcycle when he started to feel "funny" - didn't eat much for the rest of the day, pt woke up this AM with "achy" RUQ abdominal pain radiating straight through to his back. He rates the pain as 3/10 at rest, 7/10 with inspiration. Pt also admits to some SOB. He states as the day has progressed he feels the pain is under his rib cage. It is reproducible with palpation.  He reports one other similar episode ~12 years back and said he never found out the cause of the pain. He states it does not feel like his prior cardiac events.  Pt also admits to increased urinary frequency for the past few nights. Denies dysuria, hesitancy, hematuria. Patient also denies headache, changes in vision, dizziness, fevers, chills, chest pain, MICHELE, N/V/D/C.          In the ED, VS T 98.1F, HR 76, /103, RR 16 SpO2 97% on RA, lipase WNL, trops negative x2    Pt received labetalol 10mg IVP x1, Toprol XL 25mg PO x1     EKG: Sinus rhythm with 1st degree AV block, left anterior fascicular block, min voltage criteria for LVH (may be normal variant), anteroseptal infarct, age undetermined     CXR: No active pulmonary disease     HIDA scan: Normal hepatobiliary scan, no radionuclide evidence of acute cholecystitis    US gallbladder: No sonographic evidence of gallstones, borderline gallbladder wall thickening, nonspecific in nature, incidental note of hepatic steatosis    CT angio chest/ CT angio abdomen pelvis: Question minimal edema of the pancreatic head adjacent minimal infiltrative changes may be secondary to fatty replacement however correlate with amylase lipase levels to exclude early acute pancreatitis. Minimal gallbladder wall edema without radiopaque gallstones. No evidence of aortic dissection. No aortic aneurysm. Hepatomegaly with hepatic steatosis. (09 Sep 2019 21:33)            ---    HOSPITAL COURSE:     67 y/o male with PMHx HTN, HLD, DM, CAD, MI s/p CABG x2 ([id number]) s/p stent placement January 2019, valve replacement (unable to recall which one), traumatic amputation of right 1st and 2nd digits, diverticulitis, Scarlet fever presents to ED with RUQ abdominal pain and pain with inspiration of 1 days duration. Patient was admitted for abdominal pain secondary to acute pancreatitis. Ct angio showed questionable minimal edema of the pancreatic head. HIDA was performed and was negative for cholecystitis, aortic aneurysm and aortic dissection. Patients pain was not cardiac in nature, trops were negative x2. Lipase values were within normal limits at 81. MRCP was performed showing mild acute uncomplicated pancreatitis of indeterminate etiology with trace pericholecystic fluid and possible mild duodenitis. GI rec starting PPI. There were no biliary dilatation or common duct stone. LFTS wnl, triglycerides not very elevated. Surgery was consulted, Dr. Clark, suggesting no acute surgical intervention needed. IGG levels pending, outpatient f/u r/o autoimmune. GI was consulted, Dr. Grossman, suggesting patient follow up outpatient to complete EGD with EUS to rule out possible peptic ulcer disease vs unlikely malignancy given idiopathic pancreatitis (denies alc use, no stones).     Patient improved clinically throughout hospital course, tolerating regular diet, good bun/creatinine and improvementin pain.  Patient seen and examined on day of discharge.        T(C): 36.9 (09-11-19 @ 13:26), Max: 37.1 (09-11-19 @ 04:36)    HR: 58 (09-11-19 @ 13:26) (58 - 67)    BP: 150/82 (09-11-19 @ 13:26) (95/59 - 150/82)    RR: 17 (09-11-19 @ 13:26) (17 - 17)    SpO2: 98% (09-11-19 @ 13:26) (98% - 99%)    Wt(kg): --        Physical Exam:    General: Well developed, well nourished, in no acute distress, appropriately interactive    HEENT: NCAT, PERRLA, EOMI bl, moist mucous membranes     Neck: Supple, nontender    Neurology: A&Ox3, nonfocal, CN II-XII grossly intact, sensation intact, no gait abnormalities     Respiratory: CTA B/L, No wheezing, rales, or rhonchi    CV: RRR, S1/S2 present, no murmurs, rubs, or gallops, + chronic 2+ pitting edema of b/l LE    Abdominal: Soft, nontender, non-distended, normoactive bowel sounds    Extremities: No C/C/E, peripheral pulses present    MSK: No clubbing or cyanosis, no obvious deformity    Skin: warm, dry, normal color, no obvious rash         Patient is medically stable for discharge to home with outpatient GI, cards PCP followup        ---    CONSULTANTS:     Surgery    Gastroenterology FROM ADMISSION H+P:     HPI:    69 y/o male with PMHx HTN, HLD, DM, CAD, MI s/p CABG x2 ([id number]) s/p stent January 2019, bicuspid aortic valve s/p replacement (unable to recall which one), 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 Pt states he was riding on his motorcycle when he started to feel "funny" - didn't eat much for the rest of the day, pt woke up this AM with "achy" RUQ abdominal pain radiating straight through to his back. He rates the pain as 3/10 at rest, 7/10 with inspiration. Pt also admits to some SOB. He states as the day has progressed he feels the pain is under his rib cage. It is reproducible with palpation.  He reports one other similar episode ~12 years back and said he never found out the cause of the pain. He states it does not feel like his prior cardiac events.  Pt also admits to increased urinary frequency for the past few nights. Denies dysuria, hesitancy, hematuria. Patient also denies headache, changes in vision, dizziness, fevers, chills, chest pain, MICHELE, N/V/D/C.          In the ED, VS T 98.1F, HR 76, /103, RR 16 SpO2 97% on RA, lipase WNL, trops negative x2    Pt received labetalol 10mg IVP x1, Toprol XL 25mg PO x1     EKG: Sinus rhythm with 1st degree AV block, left anterior fascicular block, min voltage criteria for LVH (may be normal variant), anteroseptal infarct, age undetermined     CXR: No active pulmonary disease     HIDA scan: Normal hepatobiliary scan, no radionuclide evidence of acute cholecystitis    US gallbladder: No sonographic evidence of gallstones, borderline gallbladder wall thickening, nonspecific in nature, incidental note of hepatic steatosis    CT angio chest/ CT angio abdomen pelvis: Question minimal edema of the pancreatic head adjacent minimal infiltrative changes may be secondary to fatty replacement however correlate with amylase lipase levels to exclude early acute pancreatitis. Minimal gallbladder wall edema without radiopaque gallstones. No evidence of aortic dissection. No aortic aneurysm. Hepatomegaly with hepatic steatosis. (09 Sep 2019 21:33)            ---    HOSPITAL COURSE:     69 y/o male with PMHx HTN, HLD, DM, CAD, MI s/p CABG x2 ([id number]) s/p stent placement January 2019, valve replacement (unable to recall which one), traumatic amputation of right 1st and 2nd digits, diverticulitis, Scarlet fever presents to ED with RUQ abdominal pain and pain with inspiration of 1 days duration. Patient was admitted for abdominal pain secondary to acute pancreatitis. Ct angio showed questionable minimal edema of the pancreatic head. HIDA was performed and was negative for cholecystitis, aortic aneurysm and aortic dissection. Patients pain was not cardiac in nature, trops were negative x2. Lipase values were within normal limits at 81. MRCP was performed showing mild acute uncomplicated pancreatitis of indeterminate etiology with trace pericholecystic fluid and possible mild duodenitis. GI rec starting PPI. There were no biliary dilatation or common duct stone. LFTS wnl, triglycerides not very elevated. Surgery was consulted, Dr. Clark, suggesting no acute surgical intervention needed. IGG levels pending, outpatient f/u r/o autoimmune. GI was consulted, Dr. Grossman, suggesting patient follow up outpatient to complete EGD with EUS to rule out possible peptic ulcer disease vs unlikely malignancy given idiopathic pancreatitis (denies alc use, no stones). Isosorbide held.    Patient improved clinically throughout hospital course, tolerating regular diet, good bun/creatinine and improvement in pain.  Patient seen and examined on day of discharge.        T(C): 36.5 (09-12-19 @ 04:47), Max: 37.3 (09-11-19 @ 20:27)    HR: 57 (09-12-19 @ 06:28) (57 - 91)    BP: 112/68 (09-12-19 @ 06:28) (112/68 - 180/98)    RR: 17 (09-12-19 @ 04:47) (17 - 18)    SpO2: 97% (09-12-19 @ 04:47) (96% - 98%)    Wt(kg): --        Physical Exam:    General: Well developed, well nourished, in no acute distress, appropriately interactive    HEENT: NCAT, PERRLA, EOMI bl, moist mucous membranes     Neck: Supple, nontender    Neurology: A&Ox3, nonfocal, CN II-XII grossly intact, sensation intact, no gait abnormalities     Respiratory: CTA B/L, No wheezing, rales, or rhonchi    CV: RRR, S1/S2 present, no murmurs, rubs, or gallops, + chronic 2+ pitting edema of b/l LE    Abdominal: Soft, nontender, non-distended, normoactive bowel sounds    Extremities: No C/C/E, peripheral pulses present    MSK: No clubbing or cyanosis, no obvious deformity    Skin: warm, dry, normal color, no obvious rash         Patient is medically stable for discharge to home with outpatient GI, cards PCP followup        ---    CONSULTANTS:     Surgery    Gastroenterology

## 2019-09-12 VITALS
HEART RATE: 69 BPM | OXYGEN SATURATION: 96 % | TEMPERATURE: 99 F | RESPIRATION RATE: 18 BRPM | SYSTOLIC BLOOD PRESSURE: 145 MMHG | DIASTOLIC BLOOD PRESSURE: 86 MMHG

## 2019-09-12 DIAGNOSIS — K85.90 ACUTE PANCREATITIS WITHOUT NECROSIS OR INFECTION, UNSPECIFIED: ICD-10-CM

## 2019-09-12 DIAGNOSIS — I10 ESSENTIAL (PRIMARY) HYPERTENSION: ICD-10-CM

## 2019-09-12 DIAGNOSIS — K29.80 DUODENITIS WITHOUT BLEEDING: ICD-10-CM

## 2019-09-12 LAB — IGG4 SER-MCNC: 120 MG/DL — SIGNIFICANT CHANGE UP (ref 2.4–121)

## 2019-09-12 PROCEDURE — 99232 SBSQ HOSP IP/OBS MODERATE 35: CPT

## 2019-09-12 PROCEDURE — 99232 SBSQ HOSP IP/OBS MODERATE 35: CPT | Mod: GC

## 2019-09-12 RX ORDER — ISOSORBIDE DINITRATE 5 MG/1
1 TABLET ORAL
Qty: 0 | Refills: 0 | DISCHARGE

## 2019-09-12 RX ADMIN — Medication 81 MILLIGRAM(S): at 11:55

## 2019-09-12 RX ADMIN — Medication 25 MILLIGRAM(S): at 17:38

## 2019-09-12 RX ADMIN — CLOPIDOGREL BISULFATE 75 MILLIGRAM(S): 75 TABLET, FILM COATED ORAL at 11:55

## 2019-09-12 NOTE — PROGRESS NOTE ADULT - SUBJECTIVE AND OBJECTIVE BOX
[facility] -- Alee Mike, Jose, Maria A, Jayant Burrell Savella  Office # [phone]      Follow Up:    CAD s/p PCI , Preop eval/Post follow up   Subjective/Observations:   No events overnight resting comfortably in bed.  No complaints of chest pain, dyspnea, or palpitations reported. No signs of orthopnea or PND.     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 1000 milliLiter(s) (50 mL/Hr) IV Continuous <Continuous>  dextrose 50% Injectable 12.5 Gram(s) IV Push once  dextrose 50% Injectable 25 Gram(s) IV Push once  dextrose 50% Injectable 25 Gram(s) IV Push once  insulin lispro (HumaLOG) corrective regimen sliding scale   SubCutaneous three times a day before meals  lisinopril 5 milliGRAM(s) Oral daily  metoprolol tartrate 25 milliGRAM(s) Oral two times a day  sodium chloride 0.9%. 1000 milliLiter(s) (50 mL/Hr) IV Continuous <Continuous>    MEDICATIONS  (PRN):  acetaminophen   Tablet .. 650 milliGRAM(s) Oral every 4 hours PRN Mild Pain (1 - 3)  dextrose 40% Gel 15 Gram(s) Oral once PRN Blood Glucose LESS THAN 70 milliGRAM(s)/deciliter  glucagon  Injectable 1 milliGRAM(s) IntraMuscular once PRN Glucose LESS THAN 70 milligrams/deciliter  morphine  - Injectable 2 milliGRAM(s) IV Push every 4 hours PRN Moderate Pain (4 - 6)  morphine  - Injectable 4 milliGRAM(s) IV Push every 4 hours PRN Severe Pain (7 - 10)      Allergies    No Known Allergies    Intolerances        Vital Signs Last 24 Hrs  T(C): 36.5 (12 Sep 2019 04:47), Max: 37.3 (11 Sep 2019 20:27)  T(F): 97.7 (12 Sep 2019 04:47), Max: 99.1 (11 Sep 2019 20:27)  HR: 57 (12 Sep 2019 06:28) (57 - 91)  BP: 112/68 (12 Sep 2019 06:28) (112/68 - 180/98)  BP(mean): --  RR: 17 (12 Sep 2019 04:47) (17 - 18)  SpO2: 97% (12 Sep 2019 04:47) (96% - 98%)    I&O's Summary    11 Sep 2019 07:01  -  12 Sep 2019 07:00  --------------------------------------------------------  IN: 1140 mL / OUT: 0 mL / NET: 1140 mL          PHYSICAL EXAM:  TELE: Off tele   Constitutional: NAD, awake and alert, well-developed  HEENT: Moist Mucous Membranes, Anicteric  Pulmonary: Non-labored, breath sounds are clear bilaterally, No wheezing, crackles or rhonchi  Cardiovascular: Regular, S1 and S2 nl, No murmurs, rubs, gallops or clicks  Gastrointestinal: Bowel Sounds present, soft, nontender.   Lymph: No lymphadenopathy. No peripheral edema.  Skin: No visible rashes or ulcers.  Psych:  Mood & affect appropriate    LABS: All Labs Reviewed:                        13.9   7.68  )-----------( 269      ( 11 Sep 2019 08:34 )             40.7                         14.9   8.40  )-----------( 307      ( 10 Sep 2019 08:05 )             43.8                         15.9   9.79  )-----------( 347      ( 09 Sep 2019 14:21 )             47.6     11 Sep 2019 08:34    140    |  107    |  14     ----------------------------<  124    3.9     |  28     |  0.97   10 Sep 2019 08:07    142    |  108    |  12     ----------------------------<  144    3.9     |  27     |  1.10   09 Sep 2019 14:21    141    |  108    |  14     ----------------------------<  215    4.1     |  27     |  1.20     Ca    8.1        11 Sep 2019 08:34  Ca    8.4        10 Sep 2019 08:07  Ca    9.0        09 Sep 2019 14:21  Phos  3.0       11 Sep 2019 08:34  Mg     2.1       11 Sep 2019 08:34    TPro  6.6    /  Alb  2.8    /  TBili  0.7    /  DBili  .10    /  AST  16     /  ALT  22     /  AlkPhos  78     11 Sep 2019 08:34  TPro  7.4    /  Alb  3.2    /  TBili  0.7    /  DBili  x      /  AST  16     /  ALT  26     /  AlkPhos  90     10 Sep 2019 08:07  TPro  8.4    /  Alb  3.7    /  TBili  0.4    /  DBili  x      /  AST  15     /  ALT  28     /  AlkPhos  95     09 Sep 2019 14:21       from: Cardiac Cath Lab - Adult (19 @ 10:26) >    [place] [address]  [phone]  Cath Lab Report -- Comprehensive Report  Patient: IVANNA JETT  Study date: 2019  Account number: [id number]  MR number: [id number]  : 1950  Gender: Male  Race: W  Case Physician(s):  FIORDALIZA Mast M.D.  Fellow:  Neeru Mercado M.D.  Referring Physician:  Carlos Helms M.D.  INDICATIONS: Unstable angina - CCS4.  HISTORY: h/o CABG , LIMA to LAD, SVG to OM-1   RCA known to be small, nondominat and  No history of previous myocardial  infarction. The patient has hypertension and medication-treated  dyslipidemia.  PROCEDURE:  --  --  Left coronary angiography.  --  LIMA graft angiography.  --  Ascending aortography.  --  Diagnostic myocardial fractional flow reserve.  --  Sonosite - Diagnostic.  --  Sheath Exchange for Intervention.  --  Intervention on OM1: drug-eluting stent, balloon angioplasty.  TECHNIQUE: The risks and alternatives of the procedures and conscious  sedation were explained to the patient and informed consent was obtained.  Cardiac catheterization performed electively. Coronary intervention  performed electively.  Local anesthetic given. Left radial artery access. Local anesthetic given.  A 6 Fr. X 7cm Prelude Radial Kit was inserted in the vessel, utilizing the  modified Seldinger technique. Right femoral artery access. Local  anesthetic given. A 4fr [place] was inserted in the vessel,  utilizing the modified Seldinger technique. angiography. The vessel was  injected utilizing a catheter. Left coronary artery angiography. The  vessel was injected utilizing a 4 Fr. JL 3.5 catheter and positioned in  the vessel ostia under fluoroscopic guidance. Angiography was performed in  multiple projections using hand-injection of contrast. Left internal  mammary graft angiography. The vessel was injected utilizing a 4 Fr IM  catheter and positioned in the vessel ostia under fluoroscopic guidance.  Angiography was performed in multiple projections using hand-injection of  contrast. Ascending aortography. A 4 Fr Mod Pig Angled 5 Sideholes  catheter was placed and 30 ml contrast was injected. Imaging was performed  using an [place]ic projection. Myocardial (fractional) flow reserve in the  lesion in the mid circumflex. The vessel was entered with a 6 Fr. EBU 3.5  Launcher guiding catheter. Flow reserve was measured using a 0.014"  pressure-monitoring Verrata PLUS Wire J Tip guide-wire. Based on the  results of this study, the lesion was judged to be non-significant and no  intervention was performed. Sonosite - Diagnostic. RADIATION EXPOSURE:  34.5 min. A successful drug-eluting stent with balloon angioplasty was  performed on the 99 % lesion in the 1st obtuse marginal. Following  intervention there was an excellent angiographic appearance with a 1 %  residual stenosis. Vessel setup was performed. A 6 Fr. EBU 3.5 Launcher  guiding catheter was used to intubate the vessel. Vessel setup was  performed. A 190cm BMW UniversalII wire was used to cross the lesion.  Vessel setup was performed. A 190cm BMW Universal II wire was used to  cross the lesion. Vessel setup was performed. A Asahi Jim Blue 180cm wire  was used to cross the lesion. Vessel setup was performed. A Asahi  Extension wire was used to cross the lesion. Balloon angioplasty was  performed, using a 2.0 X 15 Sprinter OTW balloon, with 2 inflations and a  maximum inflation pressure of 12 polina. A 3.5 X 38 Resolute Hanna  drug-eluting stent was placed across the lesion and deployed at a maximum  inflation pressure of 12 polina. Sheath Exchange for Intervention.  CONTRAST GIVEN: 180 ml Optiray. 75 ml Optiray.  MEDICATIONS GIVEN: Midazolam, 1 mg, IV. Fentanyl, 25 mcg, IV. Nicardipine  (Cardene), 200 mcg, intracoronary. Nitroglycerin, 100 mcg, intracoronary.  Heparin, 5000 units, IV. Heparin, 3000 units, IV. Heparin, 5000 units, IV.  2% Lidocaine, 3 ml, subcutaneously. Cardene, 400 mcg. 0.9% Normal saline,  250 ml, IV. 2% Lidocaine, 10 ml, subcutaneously. 0.9% Normal saline, 150  ml, IV.  VENTRICLES: No left ventriculogram was performed.  CORONARY VESSELS: The coronary circulation is left dominant.  LM:   --  LM: The left anterior descending and circumflex arteries arose  anomalously from separate ostia.  LAD:   --  LAD: The distal vessel was supplied by extensive retrograde flow  from the graft(s) to the mid LAD.  --  Proximal LAD: There was a tubular 80 % stenosis at a site with no prior  intervention. The lesion was eccentric. There was ANASTASIA grade 2 flow  through the vessel (partial perfusion).  CX:   --  Mid circumflex: There was a discrete 40 % stenosis at a site with  no prior intervention. The lesion was eccentric. There was ANASTASIA grade 3  flow through the vessel (brisk flow). iFR negative 0.98  --  OM1: There was a diffuse 99 % stenosis at a site with no prior  intervention. The lesion was eccentric. There was ANASTASIA grade 2 flow  through the vessel (partial perfusion) and a large vascular territory  distal to the lesion. This is a likelyculprit for the patient's clinical  presentation. An intervention was performed.  RCA:   --  RCA: This vessel was not injected.  GRAFTS:   --  Graft to the LAD: The graft was a medium sized LIMA. Distal  vessel angiography showed mild diffuse disease.  AORTA: Ascending aorta: Normal. The segment was moderately dilated.  COMPLICATIONS: There were no complications.  DIAGNOSTIC IMPRESSIONS: Successful PCI to severe stenosis of OM-1 using  3.5mm Hanna GUY  Patent LIMA to LAD  DIAGNOSTIC RECOMMENDATIONS: DAPT x 12 months  Aggressive risk factor modification  INTERVENTIONAL IMPRESSIONS: Successful PCI to severe stenosis of OM-1 using  3.5mm Hanna GUY  Patent LIMA to LAD  INTERVENTIONAL RECOMMENDATIONS: DAPT x 12 months  Aggressive risk factor modification  Prepared and signed by  Sandee Corrales M.D.  Signed 2019 14:01:09  HEMODYNAMIC TABLES  Pressures:  Baseline  Pressures:  - HR: 52  Pressures:  - Rhythm:  Pressures:  -- Aortic Pressure (S/D/M): 101/65/80  Pressures:  Intervention  Pressures:  - HR: 50  Pressures:  - Rhythm:  Pressures:  -- Aortic Pressure (S/D/M): 117/66/86  Outputs:  Baseline  Outputs:  -- CALCULATIONS[age]  Outputs:  -- CALCULATIONS: Body Surface Area: 2.41  Outputs:  -- CALCULATIONS: Height in cm: 180.00  Outputs:  -- CALCULATIONS: Sex: Male  Outputs:  -- CALCULATIONS: Weight in k.70  Outputs:  -- OUTPUTS: O2 consumption: 301.36  Outputs:  -- OUTPUTS: Vo2 Indexed: 125.00  Outputs:  Intervention  Outputs:  -- OUTPUTS: O2 consumption: 301.36  Outputs:  -- OUTPUTS: Vo2 Indexed: 125.00    < end of copied text >    < from: CT Angio Chest w/ IV Cont (19 @ 16:12) >    EXAM:  CT ANGIO CHEST (W)AW IC                          EXAM:  CT ANGIO ABD PELV (W)AW IC                            PROCEDURE DATE:  2019          INTERPRETATION:  CLINICAL INFORMATION: Chest pain radiating to back    COMPARISON: CT chest 5/10/2015    PROCEDURE:   CT Angiography of the Chest, Abdomen and Pelvis.   Precontrast imaging was performed through the chest followed by arterial   phase imaging of the chest, abdomen and pelvis.  Intravenous contrast: 90 ml Omnipaque 350. 10 ml discarded.  Oral contrast: None.  Sagittal and coronal reformats were performed as well as 3D (MIP)   reconstructions.    FINDINGS:    CHEST:     LUNGS AND LARGE AIRWAYS: Patent central airways. No pulmonary nodules.  PLEURA: No pleural effusion.  VESSELS: Prior valve replacement.  Atherosclerotic changes of the aorta   and coronary vasculature. No aortic aneurysm. No evidence of aortic   dissection. No periaortic hematoma. Prior CABG.  HEART: Heart size is normal. No pericardial effusion.  MEDIASTINUM AND SADIA: No lymphadenopathy.  CHEST WALL AND LOWER NECK: Within normal limits.    ABDOMEN AND PELVIS:    LIVER: Hepatomegaly with hepatic steatosis. No focal hepatic masses.  BILE DUCTS: Normal caliber.  GALLBLADDER: Question minimal gallbladder wall edema. No definite   radiopaque gallstones identified.  SPLEEN: Within normal limits.  PANCREAS: Question minimal edema of the pancreatic head adjacent minimal   infiltrative changes may be secondary to fatty replacement however   correlate with amylase lipase levels to exclude early acute pancreatitis.  ADRENALS: Within normal limits.  KIDNEYS/URETERS: Within normal limits.    BLADDER: Within normal limits.  REPRODUCTIVE ORGANS: Within normal limits.    BOWEL: No bowel obstruction. Appendix is within normal limits. Colonic   diverticulosis..  PERITONEUM: No ascites.  VESSELS: Atherosclerotic changes of the aorta. No aortic dissection. No   aortic aneurysm. No perinephric hematoma.  RETROPERITONEUM/LYMPH NODES: No lymphadenopathy.    ABDOMINAL WALL: Within normal limits.  BONES: Degenerative changes. Prior sternotomy.     IMPRESSION:     Question minimal edema of the pancreatic head adjacent minimal   infiltrative changes may be secondary to fatty replacement however   correlate with amylase lipase levels to exclude early acute pancreatitis.     Minimal gallbladder wall edema without radiopaque gallstones.    No evidence of aortic dissection. No aortic aneurysm.    Hepatomegaly with hepatic steatosis.    JANET RUTHERFORD M.D.,ATTENDING RADIOLOGIST  This document has been electronically signed. Sep  9 2019  4:25PM     < end of copied text >    < from: 12 Lead ECG (19 @ 14:07) >    Ventricular Rate 67 BPM    Atrial Rate 67 BPM    P-R Interval 216 ms    QRS Duration 114 ms    Q-T Interval 430 ms    QTC Calculation(Bezet) 454 ms    R Axis 221 degrees    T Axis 76 degrees    Diagnosis Line *** Suspect arm lead reversal, interpretation assumes no reversal  Normal sinus rhythm  Poor R wave progression  Confirmed by MILAN ELLIS ([id number]) on 9/10/2019 12:46:10 PM           Sandip Trotter [facility]   Cardiology

## 2019-09-12 NOTE — PROGRESS NOTE ADULT - REASON FOR ADMISSION
Intractable pain

## 2019-09-12 NOTE — PROGRESS NOTE ADULT - PROBLEM SELECTOR PROBLEM 1
Acute pancreatitis
Acute pancreatitis
Right upper quadrant abdominal pain
Right upper quadrant abdominal pain

## 2019-09-12 NOTE — PROGRESS NOTE ADULT - ASSESSMENT
67 y/o male with PMHx HTN, HLD, DM, CAD, MI s/p CABG x2 ([id number]) s/p stent January 2019, valve replacement (unable to recall which one), traumatic amputation of right 4th and 5th digits, diverticulitis, Scarlet fever presents to ED with RUQ abdominal pain admitted for acute pancreatitis
67 y/o male with PMHx HTN, HLD, DM, CAD, MI s/p CABG x2 ([id number]) s/p stent January 2019, bicuspid aortic valve s/p replacement (unable to recall which one), traumatic amputation of right 4th and 5th digits, diverticulitis, Scarlet fever presents to ED with RUQ abdominal pain, found  to have pancreatitis    Abdominal Pain  - MRCP done showing mild acute uncomplicated pancreatitis.  No obstruction noted  - He has no anginal symptoms.  His abdominal pan is GI in nature  - Surgery eval noted.  No surgical intervention planned at this time  - GI following, plan for EGD, inpatient vs outpatient?  - In the event EGD is to be done as inpatient, patient is considered optimized form cardiac standpoint    CAD s/p CABG/PCI  - He has no anginal symptoms.  Troponins x 3 negative  - Continue DAPT  - Continue BB, ACEI, and high-intensity statin    DM2  - YviA7s=6.8  - Continue FS AC and HS  - Other care per Primary    HTN  - BP stable.  Continue ACEI at same dose for now.  If persistently low, may need to hold    HLD  - Continue statin    DVT ppx  - Per Primary    Sandip Trotter [facility]  Cardiology   Spectra #[phone]
67 yo RUQ tenderness,  slowly improving.  MRCP with pancreatitis    no acute surgical intervention    poss EGD as per GI
67 yo with epigastric/ruq pain. Slight edema on CT of pancreas     -consider MRCP     -gi consult for possible EGD???
67 yo with resolving abdominal pain         cleared for d/c      likely benefit from egd/eus
67 y/o male with PMHx HTN, HLD, DM, CAD, MI s/p CABG x2 ([id number]) s/p stent January 2019, bicuspid aortic valve s/p replacement (unable to recall which one), traumatic amputation of right 4th and 5th digits, diverticulitis, Scarlet fever presents to ED with RUQ abdominal pain, found  to have pancreatitis    Abdominal Pain  - MRCP done showing mild acute uncomplicated pancreatitis.  No obstruction noted  - He has no anginal symptoms.  His abdominal pan is GI in nature  - Surgery eval noted.  No surgical intervention planned at this time  - GI following, plan for EGD, inpatient vs outpatient?  - In the event EGD is to be done as inpatient, patient is considered optimized form cardiac standpoint    CAD s/p CABG/PCI  - He has no anginal symptoms.  Troponins x 3 negative  - Continue DAPT  - Continue BB, ACEI, and high-intensity statin    DM2  - QqtJ6o=1.8  - Continue FS AC and HS  - Other care per Primary    HTN  - BP stable, at times at 90's.  Continue ACEI at same dose for now.  If persistently low, may need to hold    HLD  - Continue statin    DVT ppx  - Per Primary    Janis Shaver [facility]  Cardiology  Spectra: [phone]
69 y/o male with PMHx HTN, HLD, DM, CAD, MI s/p CABG x2 ([phone]) s/p stent January 2019, valve replacement (unable to recall which one), 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 Pt admitted for abdominal pain, 2/2 early pancreatitis vs gastroenteritis.
67 y/o male with PMHx HTN, HLD, DM, CAD, MI s/p CABG x2 ([id number]) s/p stent January 2019, valve replacement (unable to recall which one), 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 Pt admitted for abdominal pain, 2/2 early pancreatitis vs gastroenteritis.

## 2019-09-12 NOTE — PROGRESS NOTE ADULT - SUBJECTIVE AND OBJECTIVE BOX
[facility]  Kj Martines PA-C  [address] [place] [address]  [phone]      INTERVAL HPI/OVERNIGHT EVENTS:    feels well  denies abdominal pain  tolerated diet    MEDICATIONS  (STANDING):  aspirin enteric coated 81 milliGRAM(s) Oral daily  atorvastatin 80 milliGRAM(s) Oral at bedtime  clopidogrel Tablet 75 milliGRAM(s) Oral daily  dextrose 5%. 1000 milliLiter(s) (50 mL/Hr) IV Continuous <Continuous>  dextrose 50% Injectable 12.5 Gram(s) IV Push once  dextrose 50% Injectable 25 Gram(s) IV Push once  dextrose 50% Injectable 25 Gram(s) IV Push once  insulin lispro (HumaLOG) corrective regimen sliding scale   SubCutaneous three times a day before meals  lisinopril 5 milliGRAM(s) Oral daily  metoprolol tartrate 25 milliGRAM(s) Oral two times a day  sodium chloride 0.9%. 1000 milliLiter(s) (50 mL/Hr) IV Continuous <Continuous>    MEDICATIONS  (PRN):  acetaminophen   Tablet .. 650 milliGRAM(s) Oral every 4 hours PRN Mild Pain (1 - 3)  dextrose 40% Gel 15 Gram(s) Oral once PRN Blood Glucose LESS THAN 70 milliGRAM(s)/deciliter  glucagon  Injectable 1 milliGRAM(s) IntraMuscular once PRN Glucose LESS THAN 70 milligrams/deciliter  morphine  - Injectable 2 milliGRAM(s) IV Push every 4 hours PRN Moderate Pain (4 - 6)  morphine  - Injectable 4 milliGRAM(s) IV Push every 4 hours PRN Severe Pain (7 - 10)      Allergies    No Known Allergies    Intolerances        ROS:   General:  No wt loss, fevers, chills, night sweats, fatigue,   Eyes:  Good vision, no reported pain  ENT:  No sore throat, pain, runny nose, dysphagia  CV:  No pain, palpitations, hypo/hypertension  Resp:  No dyspnea, cough, tachypnea, wheezing  GI:  No pain, No nausea, No vomiting, No diarrhea, No constipation, No weight loss, No fever, No pruritis, No rectal bleeding, No tarry stools, No dysphagia,  :  No pain, bleeding, incontinence, nocturia  Muscle:  No pain, weakness  Neuro:  No weakness, tingling, memory problems  Psych:  No fatigue, insomnia, mood problems, depression  Endocrine:  No polyuria, polydipsia, cold/heat intolerance  Heme:  No petechiae, ecchymosis, easy bruisability  Skin:  No rash, tattoos, scars, edema      PHYSICAL EXAM:   Vital Signs:  Vital Signs Last 24 Hrs  T(C): 36.9 (12 Sep 2019 14:15), Max: 37.3 (11 Sep 2019 20:27)  T(F): 98.4 (12 Sep 2019 14:15), Max: 99.1 (11 Sep 2019 20:27)  HR: 69 (12 Sep 2019 14:15) (57 - 91)  BP: 123/72 (12 Sep 2019 14:15) (112/68 - 180/98)  BP(mean): --  RR: 16 (12 Sep 2019 14:15) (16 - 18)  SpO2: 97% (12 Sep 2019 14:15) (96% - 97%)  Daily     Daily     GENERAL:  Appears stated age, well-groomed, well-nourished, no distress  HEENT:  NC/AT,  conjunctivae clear and pink, no thyromegaly, nodules, adenopathy, no JVD, sclera -anicteric  CHEST:  Full & symmetric excursion, no increased effort, breath sounds clear  HEART:  Regular rhythm, S1, S2, no murmur/rub/S3/S4, no abdominal bruit, no edema  ABDOMEN:  Soft, non-tender, non-distended, normoactive bowel sounds,  no masses ,no hepato-splenomegaly, no signs of chronic liver disease  EXTEREMITIES:  no cyanosis,clubbing or edema  SKIN:  No rash/erythema/ecchymoses/petechiae/wounds/abscess/warm/dry  NEURO:  Alert, oriented, no asterixis, no tremor, no encephalopathy      LABS:                        13.9   7.68  )-----------( 269      ( 11 Sep 2019 08:34 )             40.7     09-11    140  |  107  |  14  ----------------------------<  124<H>  3.9   |  28  |  0.97    Ca    8.1<L>      11 Sep 2019 08:34  Phos  3.0     09-11  Mg     2.1     09-11    TPro  6.6  /  Alb  2.8<L>  /  TBili  0.7  /  DBili  .10  /  AST  16  /  ALT  22  /  AlkPhos  78  09-11          RADIOLOGY & ADDITIONAL TESTS:

## 2019-09-12 NOTE — PROGRESS NOTE ADULT - ATTENDING COMMENTS
The patient was personally seen and examined, in addition to being examined and evaluated by NP.  All elements of the note were edited where appropriate.
I personally saw and examined the patient in detail.  I have spoken to the above provider regarding the assessment and plan of care.  I reviewed the above assessment and plan of care, and agree.  I have made changes in the body of the note where appropriate.
I personally conducted a physical examination of the patient. I personally gathered the patient's history. I edited the above listed findings which were prepared by the listed resident physician. I personally discussed the plan of care with the patient. The questions and concerns were addressed to the best of my ability. The patient is in agreement with the listed treatment plan.     - acute pancreatitis. clear liquid diet. if tolerates would suggest advance diet to low fat in AM and d/c planning. unknown what provoked acute pancreatitis. autoimmune workup ordered.

## 2019-09-12 NOTE — PROGRESS NOTE ADULT - PROBLEM SELECTOR PLAN 5
dvt ppx: ambulatory/venodynes  Dispo: discharge home later today pending further GI recs and if tolerating regular diet  spent 44 min on discharge process time

## 2019-09-12 NOTE — PROGRESS NOTE ADULT - PROBLEM SELECTOR PLAN 4
elevated yesterday evening due to pain, now wnl  c/w metoprolol and lisinopril 5mg daily  can hold isosorbide given bp wnl

## 2019-09-12 NOTE — PROGRESS NOTE ADULT - PROBLEM SELECTOR PLAN 1
Idiopathic Pancreatitis: no stones, lft wnl, denies alc use, triglycerides not remarkably elevated,   -IGG, MARCIA pending, outpatient f/u  -had some abd pain yesterday which now resolved, readvance diet to regular per surgery  -will discuss with GI for further recs  -needs outpatient EGD +/- EUS as outpatient, pt aware

## 2019-09-12 NOTE — PROGRESS NOTE ADULT - SUBJECTIVE AND OBJECTIVE BOX
pt seen  episode of pain last night, better this morning  ICU Vital Signs Last 24 Hrs  T(C): 36.5 (12 Sep 2019 04:47), Max: 37.3 (11 Sep 2019 20:27)  T(F): 97.7 (12 Sep 2019 04:47), Max: 99.1 (11 Sep 2019 20:27)  HR: 57 (12 Sep 2019 06:28) (57 - 91)  BP: 112/68 (12 Sep 2019 06:28) (112/68 - 180/98)  BP(mean): --  ABP: --  ABP(mean): --  RR: 17 (12 Sep 2019 04:47) (17 - 18)  SpO2: 97% (12 Sep 2019 04:47) (96% - 98%)  gen-NAD  resp-clear  abd-soft NT/ND                          13.9   7.68  )-----------( 269      ( 11 Sep 2019 08:34 )             40.7

## 2019-09-12 NOTE — PROGRESS NOTE ADULT - SUBJECTIVE AND OBJECTIVE BOX
Patient is a 68y old  Male who presents with a chief complaint of Intractable pain (12 Sep 2019 12:36)      SUBJECTIVE / OVERNIGHT EVENTS: Yesterday evening, had increasing RUQ pain with high BP, changed to clears, now this AM with no abd pain, per surgery advaced to regular diet. Patient denies any abd pain, n/v, f/c, currently. wants to go home later today if continues to feel well    MEDICATIONS  (STANDING):  aspirin enteric coated 81 milliGRAM(s) Oral daily  atorvastatin 80 milliGRAM(s) Oral at bedtime  clopidogrel Tablet 75 milliGRAM(s) Oral daily  dextrose 5%. 1000 milliLiter(s) (50 mL/Hr) IV Continuous <Continuous>  dextrose 50% Injectable 12.5 Gram(s) IV Push once  dextrose 50% Injectable 25 Gram(s) IV Push once  dextrose 50% Injectable 25 Gram(s) IV Push once  insulin lispro (HumaLOG) corrective regimen sliding scale   SubCutaneous three times a day before meals  lisinopril 5 milliGRAM(s) Oral daily  metoprolol tartrate 25 milliGRAM(s) Oral two times a day  sodium chloride 0.9%. 1000 milliLiter(s) (50 mL/Hr) IV Continuous <Continuous>    MEDICATIONS  (PRN):  acetaminophen   Tablet .. 650 milliGRAM(s) Oral every 4 hours PRN Mild Pain (1 - 3)  dextrose 40% Gel 15 Gram(s) Oral once PRN Blood Glucose LESS THAN 70 milliGRAM(s)/deciliter  glucagon  Injectable 1 milliGRAM(s) IntraMuscular once PRN Glucose LESS THAN 70 milligrams/deciliter  morphine  - Injectable 2 milliGRAM(s) IV Push every 4 hours PRN Moderate Pain (4 - 6)  morphine  - Injectable 4 milliGRAM(s) IV Push every 4 hours PRN Severe Pain (7 - 10)      Vital Signs Last 24 Hrs  T(C): 36.5 (12 Sep 2019 04:47), Max: 37.3 (11 Sep 2019 20:27)  T(F): 97.7 (12 Sep 2019 04:47), Max: 99.1 (11 Sep 2019 20:27)  HR: 57 (12 Sep 2019 06:28) (57 - 91)  BP: 112/68 (12 Sep 2019 06:28) (112/68 - 180/98)  BP(mean): --  RR: 17 (12 Sep 2019 04:47) (17 - 18)  SpO2: 97% (12 Sep 2019 04:47) (96% - 97%)  CAPILLARY BLOOD GLUCOSE      POCT Blood Glucose.: 137 mg/dL (12 Sep 2019 12:22)  POCT Blood Glucose.: 113 mg/dL (12 Sep 2019 08:15)  POCT Blood Glucose.: 172 mg/dL (11 Sep 2019 21:29)  POCT Blood Glucose.: 119 mg/dL (11 Sep 2019 16:46)    I&O's Summary    11 Sep 2019 07:01  -  12 Sep 2019 07:00  --------------------------------------------------------  IN: 1140 mL / OUT: 0 mL / NET: 1140 mL        PHYSICAL EXAM:  GENERAL: NAD, well-developed  HEAD:  Atraumatic, Normocephalic  NECK: Supple, No JVD  CHEST/LUNG: Clear to auscultation bilaterally; No wheeze  HEART: Regular rate and rhythm  ABDOMEN: Soft, minimal RUQ tenderness Nondistended; Bowel sounds present  EXTREMITIES:  2+ Peripheral Pulses, No clubbing, cyanosis, or edema  PSYCH: AAOx3  NEUROLOGY: non-focal    LABS:                        13.9   7.68  )-----------( 269      ( 11 Sep 2019 08:34 )             40.7     09-11    140  |  107  |  14  ----------------------------<  124<H>  3.9   |  28  |  0.97    Ca    8.1<L>      11 Sep 2019 08:34  Phos  3.0     09-11  Mg     2.1     09-11    TPro  6.6  /  Alb  2.8<L>  /  TBili  0.7  /  DBili  .10  /  AST  16  /  ALT  22  /  AlkPhos  78  09-11                  RADIOLOGY & ADDITIONAL TESTS:    Imaging Personally Reviewed:    Consultant(s) Notes Reviewed:  all    Care Discussed with Consultants/Other Providers: surgery

## 2019-09-15 LAB — ANA TITR SER: NEGATIVE — SIGNIFICANT CHANGE UP

## 2019-10-01 PROCEDURE — 83735 ASSAY OF MAGNESIUM: CPT

## 2019-10-01 PROCEDURE — 78226 HEPATOBILIARY SYSTEM IMAGING: CPT

## 2019-10-01 PROCEDURE — 71275 CT ANGIOGRAPHY CHEST: CPT

## 2019-10-01 PROCEDURE — 76705 ECHO EXAM OF ABDOMEN: CPT

## 2019-10-01 PROCEDURE — 96374 THER/PROPH/DIAG INJ IV PUSH: CPT

## 2019-10-01 PROCEDURE — 80053 COMPREHEN METABOLIC PANEL: CPT

## 2019-10-01 PROCEDURE — A9579: CPT

## 2019-10-01 PROCEDURE — 84484 ASSAY OF TROPONIN QUANT: CPT

## 2019-10-01 PROCEDURE — 71045 X-RAY EXAM CHEST 1 VIEW: CPT

## 2019-10-01 PROCEDURE — A9537: CPT

## 2019-10-01 PROCEDURE — 82787 IGG 1 2 3 OR 4 EACH: CPT

## 2019-10-01 PROCEDURE — 74183 MRI ABD W/O CNTR FLWD CNTR: CPT

## 2019-10-01 PROCEDURE — 80076 HEPATIC FUNCTION PANEL: CPT

## 2019-10-01 PROCEDURE — 99285 EMERGENCY DEPT VISIT HI MDM: CPT | Mod: 25

## 2019-10-01 PROCEDURE — 80048 BASIC METABOLIC PNL TOTAL CA: CPT

## 2019-10-01 PROCEDURE — 84100 ASSAY OF PHOSPHORUS: CPT

## 2019-10-01 PROCEDURE — 85027 COMPLETE CBC AUTOMATED: CPT

## 2019-10-01 PROCEDURE — 80061 LIPID PANEL: CPT

## 2019-10-01 PROCEDURE — 86038 ANTINUCLEAR ANTIBODIES: CPT

## 2019-10-01 PROCEDURE — 83036 HEMOGLOBIN GLYCOSYLATED A1C: CPT

## 2019-10-01 PROCEDURE — 93005 ELECTROCARDIOGRAM TRACING: CPT

## 2019-10-01 PROCEDURE — 82962 GLUCOSE BLOOD TEST: CPT

## 2019-10-01 PROCEDURE — 83690 ASSAY OF LIPASE: CPT

## 2019-10-01 PROCEDURE — 74174 CTA ABD&PLVS W/CONTRAST: CPT

## 2019-10-01 PROCEDURE — 36415 COLL VENOUS BLD VENIPUNCTURE: CPT

## 2019-11-29 NOTE — DISCHARGE NOTE ADULT - NS MD DC FALL RISK RISK
For information on Fall & Injury Prevention, visit [email or web address]
Initial (On Arrival)

## 2019-12-11 PROBLEM — S68.119A COMPLETE TRAUMATIC METACARPOPHALANGEAL AMPUTATION OF UNSPECIFIED FINGER, INITIAL ENCOUNTER: Chronic | Status: ACTIVE | Noted: 2019-09-09

## 2019-12-11 PROBLEM — K57.92 DIVERTICULITIS OF INTESTINE, PART UNSPECIFIED, WITHOUT PERFORATION OR ABSCESS WITHOUT BLEEDING: Chronic | Status: ACTIVE | Noted: 2019-09-09

## 2019-12-12 ENCOUNTER — OTHER (OUTPATIENT)
Age: 69
End: 2019-12-12

## 2019-12-13 ENCOUNTER — OUTPATIENT (OUTPATIENT)
Dept: OUTPATIENT SERVICES | Facility: HOSPITAL | Age: 69
LOS: 1 days | End: 2019-12-13
Payer: MEDICARE

## 2019-12-13 DIAGNOSIS — Z95.4 PRESENCE OF OTHER HEART-VALVE REPLACEMENT: Chronic | ICD-10-CM

## 2019-12-13 DIAGNOSIS — Z95.1 PRESENCE OF AORTOCORONARY BYPASS GRAFT: Chronic | ICD-10-CM

## 2019-12-13 PROCEDURE — 82962 GLUCOSE BLOOD TEST: CPT

## 2019-12-17 NOTE — H&P ADULT - GAIT/BALANCE
[facility]  HISTORY AND PHYSICAL    Patient: Benny Richard Date: 12/17/2019   female, 26 year old  Admit Date: 12/16/2019   Attending: Pennie Merino MD      DATE OF SERVICE 12/17/2019    PRIMARY CARE PROVIDER:  Brock Lea PA-C     ATTENDING PHYSICIAN    Pennie Merino MD    CHIEF COMPLAINT    Chief Complaint   Patient presents with   • Rectal Bleeding   • Constipation   • Nausea   • Vomiting       HPI    Benny Richard is a 26 year old female with history significant for asthma, diabetes mellitus type 2, thrombocytosis, autonomic dysfunction, bipolar, borderline personality, depression, as well as history of frequent ingestion and insertion of foreign bodies. She presents today 12/16/2019 through the emergency department from home with 1 week history of constipation and cramping lower abdominal pain.  Constipation resolved today while visiting the urgent care clinic when she had a large loose bowel movement with some blood.  She felt lightheaded and short of breath after the bowel movement and notes that she has been feeling lightheaded more often the past few weeks both at rest and with activity. She also has been feeling nauseous for the past week with decreased appetite, occasional dry heaves, and emesis x3 on 12/15/2019. She has also had an intermittent cough for the past month occasionally productive of yellow to green phlegm. She denies recent infection, fever, chills, sweats, body aches, colds, chest pain, unplanned weight loss, headaches, dizziness, confusion, memory problems or recent falls.    To care has complex psychiatric history that includes multiple ED visits for suicidal ideation and she has a long history of ingesting non food objects and inserting foreign bodies through her urethra.  In the past 5 years she has had at least 10 cystoscopies for the removal of foreign bodies from her bladder and has also had multiple EGDs for removal of foreign bodies from  her digestive tract.  She denies recent ingestion or insertion of foreign bodies.  She notes that the last ingestion of non food item occurred 4 months ago and involved to swallowing a single AA battery that she did passed per rectum.  However, she notes that the battery casing had ruptured before it passed.  She notes that last insertion of foreign body in her urethra was a cap from a makeup pen about 2 months ago that was removed here by a surgeon.  She denies any thoughts of harming herself at this time, or thoughts of ingesting or inserting any foreign bodies.      PAST MEDICAL & SURGICAL HISTORY    Past Medical History:   Diagnosis Date   • Asthma    • Depression    • Diabetes mellitus (CMS/HCC)    • Essential (primary) hypertension    • Gastroesophageal reflux disease    • H/O suicide attempt     multiple   • Iron deficiency anemia    • Postural orthostatic tachycardia syndrome     caused by autonomic dysfunction    • Seizures (CMS/HCC)    • Wrist fracture      Past Surgical History:   Procedure Laterality Date   • Colonoscopy diagnostic  04/25/2012   • Colonoscopy diagnostic  04/25/2012   • Cystoscopy  10/22/2019    cystoscopy with foreign body removal    • Esophagogastroduodenoscopy      foreign body removal   • Esophagogastroduodenoscopy transoral flex w/bx single or mult  07/20/2013    has had multiple   • Foreign body removal  06/30/2017    rectal foreign body   • Fracture surgery     • Knee surgery  09/14/2012    left   • Knee surgery Left 08/12/2019    with patellofemoral reconstruction   • Nasal hemorrhage control  07/05/2013   • Removal of foreign body      multiple   • Removal of foreign body  03/14/2017   • Wrist fracture surgery      left       CURRENT MEDICATIONS- medication list and allergies personally reviewed in epic  Medications Prior to Admission   Medication Sig Dispense Refill   • methylPREDNISolone (MEDROL, BRUCE,) 4 MG tablet follow package directions 21 tablet 0   • albuterol 108 (90  Base) MCG/ACT inhaler Inhale 2 puffs into the lungs every 4 hours as needed (Shortness of breath). 1 Inhaler 1   • azithromycin (ZITHROMAX) 250 MG tablet Take 1 tablet by mouth daily. 2 tabs po today, then 1 tab daily until gone. 6 tablet 0   • benzonatate (TESSALON PERLES) 100 MG capsule Take 2 capsules by mouth 3 times daily as needed for Cough. 30 capsule 0   • albuterol (ACCUNEB) 0.63 MG/3ML nebulizer solution Take 3 mLs by nebulization every 6 hours as needed for Wheezing. 375 mL 12   • QUEtiapine (SEROQUEL) 25 MG tablet Take 25 mg by mouth daily as needed (Mood Swings).     • clozapine (CLOZARIL) 50 MG tablet Take 50 mg by mouth nightly. Take with 200 mg dose to = 250 mg     • fluoxetine (PROZAC) 40 MG capsule Take 80 mg by mouth daily.     • atropine (ISOPTO ATROPINE) 1 % ophthalmic solution Place 3 drops under the tongue at bedtime.     • omeprazole (PRILOSEC) 40 MG capsule Take 40 mg by mouth daily.     • haloperidol (HALDOL) 2 MG tablet Take 3 mg by mouth at bedtime.     • ondansetron (ZOFRAN ODT) 4 MG disintegrating tablet Place 4 mg onto the tongue every 8 hours as needed for Nausea.     • metoPROLOL tartrate (LOPRESSOR) 25 MG tablet Take 12.5 mg by mouth 2 times daily.      • loratadine (CLARITIN) 10 MG tablet Take 10 mg by mouth daily.     • senna (SENOKOT) 8.6 MG tablet Take 1 tablet by mouth 2 times daily.     • ferrous sulfate 325 (65 FE) MG tablet Take 325 mg by mouth daily (with breakfast).     • cloZAPine (CLOZARIL) 100 MG tablet Take 200 mg by mouth nightly. Take with 50 mg tablet to = 250 mg     • Lactobacillus Acid-Pectin (ACIDOPHILUS/CITRUS PECTIN PO) Take 1 capsule by mouth daily.     • Cholecalciferol (VITAMIN D3) 2000 units Tab Take 2,000 Units by mouth daily. Take at noon     • metFORMIN (GLUCOPHAGE) 500 MG tablet Take 500 mg by mouth daily (with breakfast).      • MedroxyPROGESTERone Acetate 150 MG/ML Suspension Prefilled Syringe Inject 150 mg into the muscle every 3 months.     •  nalTREXone (REVIA) 50 MG tablet Take 50 mg by mouth nightly.      • fludrocortisone (FLORINEF) 0.1 MG tablet Take 0.2 mg by mouth daily. Takes at noon  Dose: 2 tablets (=0.2 mg)     • midodrine (PROAMATINE) 5 MG tablet Take 15 mg by mouth 3 times daily.      • docusate sodium (COLACE) 100 MG capsule Take 100 mg by mouth nightly as needed for Constipation.      • fluticasone-salmeterol (ADVAIR) 500-50 MCG/DOSE AEROSOL POWDER, BREATH ACTIVATED Inhale 1 puff into the lungs two times daily. Noon and bedtime     • sodium chloride (OCEAN NASAL SPRAY) 0.65 % nasal spray Spray 2 sprays in each nostril 4 times daily as needed (Dryness).      • diphenhydrAMINE (BENADRYL) 25 MG capsule Take 25 mg by mouth every 6 hours as needed for Itching (agitation).     • HYDROcodone-acetaminophen (NORCO) 5-325 MG per tablet Take 1 tablet by mouth every 4 hours as needed for Pain. 10 tablet 0   • acetaminophen (TYLENOL) 325 MG tablet Take 650 mg by mouth every 8 hours as needed for Pain.          ALLERGIES  ALLERGIES:   Allergen Reactions   • Codeine ANAPHYLAXIS   • Cucumbers [Cucumber] ANAPHYLAXIS   • Fish ANAPHYLAXIS   • Seafood   (Food) ANAPHYLAXIS   • Shellfish Allergy   (Food Or Med) ANAPHYLAXIS   • Toradol SEIZURES and ANAPHYLAXIS   • Tramadol SEIZURES   • Contrast Media RASH   • Etodolac Other (See Comments)     unknown   • Iodine   (Environmental Or Med) RASH   • Piatt Other (See Comments)       SOCIAL HISTORY    Social History     Tobacco Use   • Smoking status: Never Smoker   • Smokeless tobacco: Never Used   Substance Use Topics   • Alcohol use: No     Frequency: Never     Drinks per session: 1 or 2     Binge frequency: Never   • Drug use: No       FAMILY HISTORY    Family History   Problem Relation Age of Onset   • Diabetes Mother    • Heart disease Mother    • Asthma Mother    • Diabetes Father    • Heart disease Father        REVIEW OF SYSTEMS (Bold is positive)    Otherwise 13 systems were reviewed and found to be negative  except for what is mentioned in the HPI.    PHYSICAL EXAM (Bold is pertinent finding):  Vital 24 Hour Range   Temperature Temp  Min: 97.1 °F (36.2 °C)  Max: 98.3 °F (36.8 °C)   Pulse Pulse  Min: 92  Max: 154   Respiratory Resp  Min: 16  Max: 22   Blood Pressure BP  Min: 123/86  Max: 151/72   Pulse Oximetry SpO2  Min: 95 %  Max: 99 %     Visit Vitals  /79 (BP Location: RUE - Right upper extremity, Patient Position: Semi-Chatman's)   Pulse 115   Temp 98.1 °F (36.7 °C) (Oral)   Resp 16   Ht 5' 4\" (1.626 m)   Wt 119.7 kg   SpO2 96%   BMI 45.30 kg/m²     General:  26 year old year-old female, cooperative, in no apparent distress  HEENT:  EOM intact, PERRL, no scleral icterus or conjunctival pallor, no nasal discharge, oropharynx without erythema or exudate  Neck:  Trachea midline, supple, absent jugular venous distension.  Cardiovascular:  Rate and rhythm regular, normal S1 S2, absent murmurs, absent rubs or gallops.   Pulmonary:  Absent retractions or increased work of breathing, lung fields clear to auscultation bilaterally except for diminished breath sounds in bilateral bases.  Abdomen:  Obese, normoactive bowel sounds all 4 quadrants, absent bruit, soft, mild diffuse upper quadrant tenderness and moderate diffuse lower quadrant tenderness, absent guarding, absent rebound tenderness, absent hepatosplenomegaly, absent hernias.  :  Absent suprapubic or costovertebral angle tenderness  Extremities:  Absent clubbing. Absent cyanosis. Absent edema. Prominent bilateral lower leg venous congestion. Peripheral pulses symmetric 2+ bilateral radial, 2+ bilateral post-tibial and 2+ bilateral dorsalis pedis. Capillary refill brisk <2 seconds  Musculoskeletal:  Absent deformities. Range of motion grossly normal in all major joints of bilateral upper and lower extremities.  Skin:  Warm, dry, and intact. Absent rashes. Absent jaundice. Absent other lesions  Neuro:  Cranial nerves 2-12 grossly intact. Absent gross sensory  deficits in upper and lower extremities. Absent motor drift bilateral upper extremities. Absent motor drift bilateral lower extremities.  Psych:  Somnolent, but easily awakened by voice. When awake she is oriented x4 (person, place, time and situation). Mood dysphoric, stable. Affect shallow, at times disinterested. Communicates appropriate judgement and insight.      LABS      Recent Labs   Lab 12/17/19  0505 12/16/19  1648   SODIUM  --  135   POTASSIUM  --  4.0   CHLORIDE  --  97*   CO2  --  24   BUN  --  16   CREATININE  --  0.73   GLUCOSE  --  434*   WBC 19.9* 21.7*   HGB 11.8* 13.2   HCT 37.2 40.9   * 679*       Hemoglobin A1C (%)   Date Value   01/31/2015 5.1       TSH (mcUnits/mL)   Date Value   07/31/2015 3.170       Recent Labs   Lab 12/17/19  0137   RAPDTR <0.02     Results for orders placed or performed during the hospital encounter of 01/25/15   Blood Culture   Result Value Ref Range    Specimen Description BLOOD, PERIPHERAL HAND, LEFT     SPECIAL REQUESTS RECEIVED AEROBIC BOTTLE ONLY     CULTURE NO GROWTH 5 DAYS.     REPORT STATUS 02/07/2015 FINAL       Results for orders placed or performed in visit on 06/13/18   URINE CULTURE   Result Value Ref Range    Specimen Description URINE, CLEAN CATCH/MIDSTREAM     CULTURE       >100,000 CFU/mL MULTIPLE ORGANISMS ISOLATED WITH NO PREDOMINANT TYPE, CONSISTENT WITH CONTAMINATION. CONSIDER RECOLLECTION.    REPORT STATUS 11/29/2018 FINAL        IMAGING:  CT ANGIOGRAM CHEST PE IMAGING- 3D   Final Result   IMPRESSION:       1. No evidence of central or segmental pulmonary embolus.   2. Moderate dependent atelectasis and hazy groundglass opacity. These   findings are nonspecific but can be seen with reactive airways disease. No   dense focal consolidation or effusion.         XR CHEST AP OR PA - PORTABLE   Final Result   IMPRESSION : No acute disease                  CT ABDOMEN PELVIS WO CONTRAST   Final Result   IMPRESSION:  No acute abnormality is seen.       Bowel loops appear normal in diameter without evidence of obstruction.      Left renal pelvis is more prominent when compared to the prior examination   although there is no caliectasis. The findings may represent a mild UPJ   stenosis of questionable significance.                 CODE STATUS- Full Resuscitation    ASSESSMENT & PLAN:  Sepsis without septic shock (CMS/[facility]): Sepsis criteria met. Unkown source of infection. Patient has history of foreign body ingestion and urethral insertion, but denies recent ingestion/insertion and CT abd/pelvis and chest do not reveal any foreign bodies. Temperature 97.1°F, heart rate 144 BPM, respiratory rate 20, and WBC equals 21,700.  Lactic acid level equals 3.3. Repeat lactic acid equals 2.5. Blood cultures ×2 drawn. UA resulted as negative for signs of infection. IV antibiotic therapy initiated with Zosyn and vancomycin. 2 L normal saline bolus delivered in the ED. Septic shock perfusion assessment completed. Patient does meet criteria for severe sepsis but not septic shock. Patient does not meet criteria for fluid resuscitation.   Generalized abdominal pain, Rectal bleedin week history of cramping bilateral lower abdominal pain and constipation.  Constipation resolved today prior to arrival with large loose bowel movement with some blood.  Cramping abdominal pain continues.  Urine pregnancy is negative.  CT abdomen pelvis absent evidence of acute inflammation or obstruction,  shows normal stool and bowel gas pattern, and is absent abdominal or pelvic masses.  Left renal pelvis slightly enlarged with mild left ureteral pelvic junction stenosis, but absent stones.  Stool culture, stool leukocytes and stool occult blood studies ordered.  Hx of foreign body insertion:  Long history insertion of foreign bodies into urethra and ingestion of non food foreign bodies.  Ten cystoscopies with foreign body extraction performed the last 5 years and also multiple EGDs with foreign  body extraction over same time period.  Last foreign body ingestion was approximately 4 months ago and included a single AA battery that she reports passing per rectum.  However she noted that battery casing had ruptured prior to passing.  Last urethral foreign body insertion was in October 2019 and was extracted via cystoscopy on 10/22/2019.  She denies any foreign body ingestion or insertion subsequent to episodes noted above.  Uncontrolled type 2 diabetes mellitus with hyperglycemia (CMS/[facility]):  Home point of care blood glucose monitoring performed daily in a.m. with results typically 150-200.  Takes metformin daily. Blood glucose upon admission equals 434. Last hemoglobin A1C on 01/31/2015 equals 5.1.  Point of care blood glucose checks four times daily with meals and at bedtime (q.i.d.a.c.&h.s.) ordered. Sliding scale insulin aspart q.i.d.a.c.&h.s. ordered. Metformin held due to elevated lactic acid and administration of IV contrast solution. Repeat hemoglobin A1C ordered. Consistent carbohydrate diet ordered.  Moderate persistent asthma without complication: Breathing unlabored and lungs clear to auscultation upon exam. Normal room air oxygenation. Continuation of scheduled Advair as prior to admission ordered with formulary substitution. Duoneb nebulizer treatments q6 hour PRN ordered. RT consult ordered.  Depression, Borderline personality disorder (CMS/HCC)gestion, Bipolar affective disorder (CMS/[facility]), PTSD (post-traumatic stress disorder): Mood and affect appear to be at baseline. She denies suicidal ideation or intent to hurt herself. Continuation of clozapine, fluoxetine, quetiapine, and haloperidol as prior to admission ordered.  Thrombocytosis (CMS/[facility]): Upon arrival platelets equal 679. This is essentially her baseline that has been 500-600 for the past 3 years.  Will continue to monitor.  CBC ordered for a.m.  • DVT Prophylaxis: Lovenox (dose adjusted for calculated CrCl) and SCDs     Disposition:  Admitted to inpatient Discharge to home/self-care expected.  Is the patient expected to require a two midnight stay in the hospital? Yes.   I certify that I expect inpatient services for greater than two midnights are medically necessary for this patient.      The patient's treatment plan was discussed with patient and family, RN, and the attending physician.  Please see H&P and MD Progress Notes for additional information about the patient's course of treatment.    I personally spent 90 minutes today in the care and management of this patient.    Gwyn Carroll DNP, FNP-BC, APNP  [facility]  12/17/2019  5:26 AM     Hospitalist Co-Management Note:    Patient seen and examined by me in follow up for Sepsis (CMS/[facility]).  I have discussed this case in detail with Gwyn Carroll NP and I agree with the plan of care.    PE:    Visit Vitals  /79 (BP Location: RUE - Right upper extremity, Patient Position: Semi-Chatman's)   Pulse 115   Temp 98.1 °F (36.7 °C) (Oral)   Resp 16   Ht 5' 4\" (1.626 m)   Wt 119.7 kg   SpO2 96%   BMI 45.30 kg/m²       General - Alert and oriented ×3. Answers questions appropriately.  Nontoxic-appearing.  HEENT - Pupils equal. Sclerae are anicteric. Oropharyngeal mucous membranes are moist. Neck is soft and supple.   Cardiovascular - tachy, no rubs, gallops, or clicks. Nondisplaced PMI.  No heart murmur.  Pulmonary - Good respiratory effort. No accessory muscle use.   Lungs - Clear to auscultation bilaterally. No wheezes, rales, or rhonchi.   Abdomen - Soft, nontender,nondistended. Bowel sounds are normoactive. No guarding or rebound tenderness. No Hepatosplenomegaly.  Extremities - Neg calf tenderness bilaterally.  No pretibial edema bilaterally, prominent varicose veins bilat.  Capillary refill is brisk. Radial pulse 2+ and dorsalis pedis pulses 2+ bilaterally  Skin- No rashes or lesions.   Neurologic - No motor drift upper extremities, No motor drift lower extremities. Cranial nerves  II-XII are intact.     Assessment/Plan:      Principal Problem:    Sepsis without septic shock (CMS/HCC):  Unclear source with CT scan of the abdomen and pelvis showing no acute changes.  CT angiogram of the chest is negative for PE or infiltrate.  Initial lactic acid level equals 3.3 with heart rate 120 and [place] blood [place] 59[address] without hypotension, normal procalcitonin level.  Urinalysis unremarkable.  Blood cultures drawn in the emergency room and she received fluid resuscitation.  Started on Zosyn and vancomycin which will be continued.  Order echocardiogram.  History of foreign body ingestion, urethral insertion of objects, swallowing batteries, self injecting into IVs.  Makes verbal contract that she will not do this but still needs to be watched very carefully.  Active Problems:    Bipolar affective disorder (CMS/HCC), Depression, Borderline personality disorder (CMS/HCC), PTSD (post-traumatic stress disorder) :  Denies depression or suicidal ideation currently.  Has had inpatient psychiatric stays in the past.  She will continue on fluoxetine, clozapine, Seroquel, Haldol as prior to admission.   Hx of foreign body ingestion:  Last hospitalized at [facility] September of 2019 for foreign body ingestion, battery.  Underwent colonoscopy which was unremarkable.   Order sitter at bedside.  Sinus tachycardia with abnormal EKG:  Heart rate remains 120 despite 2 L IV fluid in the emergency room.  EKG shows nonspecific ST segment depression V3 through V5.  She is kept on telemetry monitoring and order echocardiogram.  Troponin x2 is normal.    Thrombocytosis (CMS/HCC):  Chart review shows that she has had elevated platelet level going back to at least 2009, range of 400,000. Currently 679,000.  I do not see a Hematology evaluation.  Unclear whether this is reactive.  Will continue to monitor.    Moderate persistent asthma without complication:  Normal room air oxygenation.  She will continue on inhalers and  nebulizers as prior to admission.    Uncontrolled type 2 diabetes mellitus with hyperglycemia (CMS/[facility]):  Initial blood glucose equals 434 without acidosis.  Maintained on metformin prior to admission.  Moderate carbohydrate ADA diet.  Fingerstick blood sugar checks and sliding scale insulin 4 times daily.  Monitor for hyper or hypoglycemia.  Order hemoglobin A1c.          I personally spent 65 minutes today in the care and management of this patient.      Pennie Merino MD  12/17/2019  5:51 AM           stable

## 2020-01-03 ENCOUNTER — OUTPATIENT (OUTPATIENT)
Dept: OUTPATIENT SERVICES | Facility: HOSPITAL | Age: 70
LOS: 1 days | End: 2020-01-03
Payer: MEDICARE

## 2020-01-03 ENCOUNTER — RESULT REVIEW (OUTPATIENT)
Age: 70
End: 2020-01-03

## 2020-01-03 DIAGNOSIS — K92.2 GASTROINTESTINAL HEMORRHAGE, UNSPECIFIED: ICD-10-CM

## 2020-01-03 DIAGNOSIS — Z95.4 PRESENCE OF OTHER HEART-VALVE REPLACEMENT: Chronic | ICD-10-CM

## 2020-01-03 DIAGNOSIS — K85.90 ACUTE PANCREATITIS WITHOUT NECROSIS OR INFECTION, UNSPECIFIED: ICD-10-CM

## 2020-01-03 DIAGNOSIS — Z95.1 PRESENCE OF AORTOCORONARY BYPASS GRAFT: Chronic | ICD-10-CM

## 2020-01-03 LAB — GLUCOSE BLDC GLUCOMTR-MCNC: 127 MG/DL — HIGH (ref 70–99)

## 2020-01-03 PROCEDURE — 88312 SPECIAL STAINS GROUP 1: CPT | Mod: 26

## 2020-01-03 PROCEDURE — 45380 COLONOSCOPY AND BIOPSY: CPT | Mod: PT

## 2020-01-03 PROCEDURE — 88312 SPECIAL STAINS GROUP 1: CPT

## 2020-01-03 PROCEDURE — 88305 TISSUE EXAM BY PATHOLOGIST: CPT | Mod: 26

## 2020-01-03 PROCEDURE — 82962 GLUCOSE BLOOD TEST: CPT

## 2020-01-03 PROCEDURE — 88305 TISSUE EXAM BY PATHOLOGIST: CPT

## 2020-01-03 PROCEDURE — 43239 EGD BIOPSY SINGLE/MULTIPLE: CPT

## 2020-01-03 PROCEDURE — 43259 EGD US EXAM DUODENUM/JEJUNUM: CPT

## 2020-01-06 LAB — SURGICAL PATHOLOGY STUDY: SIGNIFICANT CHANGE UP

## 2020-01-24 DIAGNOSIS — K85.90 ACUTE PANCREATITIS WITHOUT NECROSIS OR INFECTION, UNSPECIFIED: ICD-10-CM

## 2020-03-04 ENCOUNTER — APPOINTMENT (OUTPATIENT)
Dept: CARDIOLOGY | Facility: CLINIC | Age: 70
End: 2020-03-04
Payer: MEDICARE

## 2020-03-04 ENCOUNTER — NON-APPOINTMENT (OUTPATIENT)
Age: 70
End: 2020-03-04

## 2020-03-04 VITALS
WEIGHT: 260 LBS | OXYGEN SATURATION: 97 % | DIASTOLIC BLOOD PRESSURE: 101 MMHG | HEIGHT: 71 IN | BODY MASS INDEX: 36.4 KG/M2 | SYSTOLIC BLOOD PRESSURE: 163 MMHG | HEART RATE: 77 BPM

## 2020-03-04 DIAGNOSIS — Q23.1 CONGENITAL INSUFFICIENCY OF AORTIC VALVE: ICD-10-CM

## 2020-03-04 PROCEDURE — 99213 OFFICE O/P EST LOW 20 MIN: CPT

## 2020-03-04 PROCEDURE — 93000 ELECTROCARDIOGRAM COMPLETE: CPT

## 2020-03-11 NOTE — DISCUSSION/SUMMARY
[FreeTextEntry1] : Mr. Batres is a 67 y/o man with Hchol, prior tobacco use, bicuspid AV stenosis, NSTEMI, s/p 2V CABG and AVR(t)  \par \par s/p recent PCI to Lcx at [facility] for ACS  \par BP elevated - titrate ACe-I\par Check labs\par \par f/u in 3-6 mo\par

## 2020-03-11 NOTE — HISTORY OF PRESENT ILLNESS
[FreeTextEntry1] : Jimbo Batres is returning to the office to follow-up - \par Feels well. No complaints\par No CP\par No LE edema\par BP stable\par compliant with meds\par

## 2020-03-17 LAB
ALBUMIN SERPL ELPH-MCNC: 4.3 G/DL
ALP BLD-CCNC: 106 U/L
ALT SERPL-CCNC: 15 U/L
ANION GAP SERPL CALC-SCNC: 17 MMOL/L
AST SERPL-CCNC: 18 U/L
BILIRUB SERPL-MCNC: 0.3 MG/DL
BUN SERPL-MCNC: 14 MG/DL
CALCIUM SERPL-MCNC: 9.3 MG/DL
CHLORIDE SERPL-SCNC: 104 MMOL/L
CHOLEST SERPL-MCNC: 229 MG/DL
CHOLEST/HDLC SERPL: 6.1 RATIO
CO2 SERPL-SCNC: 22 MMOL/L
CREAT SERPL-MCNC: 1.11 MG/DL
ESTIMATED AVERAGE GLUCOSE: 180 MG/DL
GLUCOSE SERPL-MCNC: 168 MG/DL
HBA1C MFR BLD HPLC: 7.9 %
HDLC SERPL-MCNC: 38 MG/DL
LDLC SERPL CALC-MCNC: 139 MG/DL
POTASSIUM SERPL-SCNC: 5.2 MMOL/L
PROT SERPL-MCNC: 7.5 G/DL
SODIUM SERPL-SCNC: 143 MMOL/L
TRIGL SERPL-MCNC: 263 MG/DL
TSH SERPL-ACNC: 4.3 UIU/ML

## 2020-04-23 NOTE — H&P ADULT - NSHPPHYSICALEXAM_GEN_ALL_CORE
Vital Signs Last 24 Hrs  T(C): 36.9 (09 Sep 2019 21:45), Max: 36.9 (09 Sep 2019 21:45)  T(F): 98.4 (09 Sep 2019 21:45), Max: 98.4 (09 Sep 2019 21:45)  HR: 70 (09 Sep 2019 21:45) (65 - 76)  BP: 189/84 (09 Sep 2019 21:45) (184/103 - 200/93)  RR: 16 (09 Sep 2019 21:45) (16 - 18)  SpO2: 98% (09 Sep 2019 21:45) (97% - 99%)    General: Well developed, well nourished, NAD  HEENT: NC/AT, EOMI b/l, moist mucous membranes   Neurology: AAOx3, motor strength grossly intact, no obvious sensory deficits   Respiratory: Lungs CTA b/l, no wheezing, rhonchi or rubs  CV: RRR, +S1/S2, no murmurs, rubs or gallops  Abdominal: Abdomen soft, +TTP RUQ, RLQ. +Mildly distended. +BS x4 quadrants   Extremities: No C/C/E, 2+ peripheral pulses  MSK: +Right UE 4th and 5th digit traumatic amputation   Skin: Warm, dry, normal color
Abdomen soft, non-tender, no guarding.

## 2020-06-17 NOTE — OCCUPATIONAL THERAPY INITIAL EVALUATION ADULT - PATIENT PROFILE REVIEW, REHAB EVAL
yes
yes
Socioeconomic History    Marital status:      Spouse name: Not on file    Number of children: Not on file    Years of education: Not on file    Highest education level: Not on file   Occupational History    Occupation:    Social Needs    Financial resource strain: Not on file    Food insecurity     Worry: Not on file     Inability: Not on file   [employer] needs     Medical: Not on file     Non-medical: Not on file   Tobacco Use    Smoking status: Never Smoker    Smokeless tobacco: Never Used   Substance and Sexual Activity    Alcohol use: No    Drug use: No    Sexual activity: Not Currently     Partners: Female   Lifestyle    Physical activity     Days per week: Not on file     Minutes per session: Not on file    Stress: Not on file   Relationships    Social connections     Talks on phone: Not on file     Gets together: Not on file     Attends Voodoo service: Not on file     Active member of club or organization: Not on file     Attends meetings of clubs or organizations: Not on file     Relationship status: Not on file    Intimate partner violence     Fear of current or ex partner: Not on file     Emotionally abused: Not on file     Physically abused: Not on file     Forced sexual activity: Not on file   Other Topics Concern    Not on file   Social History Narrative    Not on file       Review ofSystems:  Review of Systems   Constitutional: Negative for fever. Respiratory: Negative for shortness of breath. Gastrointestinal: Negative for constipation and diarrhea. Genitourinary: Positive for difficulty urinating. Skin: Negative for wound. Neurological: Positive for weakness. Physical Exam:   /68   Pulse 70   Ht 6' 1\" (1.854 m) Comment: pt reported  SpO2 98%   BMI 32.40 kg/m²      Physical Exam  Constitutional:       Appearance: Normal appearance. Comments: In wheelchair   HENT:      Head: Normocephalic and atraumatic.    Eyes:

## 2020-07-08 ENCOUNTER — APPOINTMENT (OUTPATIENT)
Dept: CARDIOLOGY | Facility: CLINIC | Age: 70
End: 2020-07-08
Payer: MEDICARE

## 2020-07-08 ENCOUNTER — NON-APPOINTMENT (OUTPATIENT)
Age: 70
End: 2020-07-08

## 2020-07-08 VITALS — HEART RATE: 58 BPM | DIASTOLIC BLOOD PRESSURE: 83 MMHG | SYSTOLIC BLOOD PRESSURE: 150 MMHG | OXYGEN SATURATION: 98 %

## 2020-07-08 DIAGNOSIS — I25.2 OLD MYOCARDIAL INFARCTION: ICD-10-CM

## 2020-07-08 DIAGNOSIS — E78.5 HYPERLIPIDEMIA, UNSPECIFIED: ICD-10-CM

## 2020-07-08 DIAGNOSIS — I25.10 ATHEROSCLEROTIC HEART DISEASE OF NATIVE CORONARY ARTERY W/OUT ANGINA PECTORIS: ICD-10-CM

## 2020-07-08 PROCEDURE — 99213 OFFICE O/P EST LOW 20 MIN: CPT

## 2020-07-08 PROCEDURE — 93000 ELECTROCARDIOGRAM COMPLETE: CPT

## 2020-07-08 NOTE — PHYSICAL EXAM
[General Appearance - Well Developed] : well developed [Normal Appearance] : normal appearance [Well Groomed] : well groomed [General Appearance - Well Nourished] : well nourished [No Deformities] : no deformities [General Appearance - In No Acute Distress] : no acute distress [Normal Conjunctiva] : the conjunctiva exhibited no abnormalities [Eyelids - No Xanthelasma] : the eyelids demonstrated no xanthelasmas [Normal Oral Mucosa] : normal oral mucosa [No Oral Pallor] : no oral pallor [No Oral Cyanosis] : no oral cyanosis [Normal Jugular Venous A Waves Present] : normal jugular venous A waves present [Normal Jugular Venous V Waves Present] : normal jugular venous V waves present [No Jugular Venous Cardenas A Waves] : no jugular venous cardenas A waves [Respiration, Rhythm And Depth] : normal respiratory rhythm and effort [Exaggerated Use Of Accessory Muscles For Inspiration] : no accessory muscle use [Bibasilar Rales/Crackles] : bibasilar rales [Scattered Wheezes] : scattered wheezing [Decreased Breath Sounds] : decreased breath sounds [Systolic grade ___/6] : A grade [unfilled]/6 systolic murmur was heard. [Heart Sounds] : normal S1 and S2 [Heart Rate And Rhythm] : heart rate and rhythm were normal [Abdomen Tenderness] : non-tender [Abdomen Soft] : soft [Abnormal Walk] : normal gait [Abdomen Mass (___ Cm)] : no abdominal mass palpated [Gait - Sufficient For Exercise Testing] : the gait was sufficient for exercise testing [Nail Clubbing] : no clubbing of the fingernails [Petechial Hemorrhages (___cm)] : no petechial hemorrhages [Cyanosis, Localized] : no localized cyanosis [Skin Color & Pigmentation] : normal skin color and pigmentation [] : no ischemic changes [No Skin Ulcers] : no skin ulcer [Skin Lesions] : no skin lesions [No Venous Stasis] : no venous stasis [No Xanthoma] : no  xanthoma was observed [Oriented To Time, Place, And Person] : oriented to person, place, and time [Affect] : the affect was normal [Mood] : the mood was normal [No Anxiety] : not feeling anxious

## 2020-07-14 NOTE — HISTORY OF PRESENT ILLNESS
[FreeTextEntry1] : Jimbo Batres is returning to the office to follow-up - planning on hip surgery\par \par Feels well. \par No CP\par Minor LE edema\par compliant with meds\par

## 2020-07-14 NOTE — DISCUSSION/SUMMARY
[FreeTextEntry1] : Mr. Batres is a 68 y/o man with Hchol, prior tobacco use, bicuspid AV stenosis, NSTEMI, s/p 2V CABG and AVR(t)  - recent OM PCI with [facility]Y in 1/28/19.\par \par He is stable from a cardiac perspective. \par He may stop his plavix and remain on asa for his upcoming surgery.\par His Plavix maybe stopped 7-10 days prior to the procedure.\par He is at an acceptable risk to proceed with the planned orthopedic procedure. No further testing is required.\par \par \par  \par  \par \par

## 2020-07-29 ENCOUNTER — OUTPATIENT (OUTPATIENT)
Dept: OUTPATIENT SERVICES | Facility: HOSPITAL | Age: 70
LOS: 1 days | End: 2020-07-29
Payer: MEDICARE

## 2020-07-29 ENCOUNTER — APPOINTMENT (OUTPATIENT)
Dept: CV DIAGNOSTICS | Facility: HOSPITAL | Age: 70
End: 2020-07-29

## 2020-07-29 ENCOUNTER — APPOINTMENT (OUTPATIENT)
Dept: CV DIAGNOSITCS | Facility: HOSPITAL | Age: 70
End: 2020-07-29

## 2020-07-29 DIAGNOSIS — Z95.1 PRESENCE OF AORTOCORONARY BYPASS GRAFT: Chronic | ICD-10-CM

## 2020-07-29 DIAGNOSIS — Z95.4 PRESENCE OF OTHER HEART-VALVE REPLACEMENT: Chronic | ICD-10-CM

## 2020-07-29 DIAGNOSIS — I25.10 ATHEROSCLEROTIC HEART DISEASE OF NATIVE CORONARY ARTERY WITHOUT ANGINA PECTORIS: ICD-10-CM

## 2020-07-29 PROCEDURE — 93016 CV STRESS TEST SUPVJ ONLY: CPT

## 2020-07-29 PROCEDURE — C8929: CPT

## 2020-07-29 PROCEDURE — A9500: CPT

## 2020-07-29 PROCEDURE — 93306 TTE W/DOPPLER COMPLETE: CPT | Mod: 26

## 2020-07-29 PROCEDURE — 71046 X-RAY EXAM CHEST 2 VIEWS: CPT | Mod: 26

## 2020-07-29 PROCEDURE — 93017 CV STRESS TEST TRACING ONLY: CPT

## 2020-07-29 PROCEDURE — 71046 X-RAY EXAM CHEST 2 VIEWS: CPT

## 2020-07-29 PROCEDURE — 78452 HT MUSCLE IMAGE SPECT MULT: CPT | Mod: 26

## 2020-07-29 PROCEDURE — 93018 CV STRESS TEST I&R ONLY: CPT

## 2020-07-29 PROCEDURE — 78452 HT MUSCLE IMAGE SPECT MULT: CPT

## 2020-08-11 ENCOUNTER — APPOINTMENT (OUTPATIENT)
Dept: CV DIAGNOSTICS | Facility: HOSPITAL | Age: 70
End: 2020-08-11

## 2021-04-05 ENCOUNTER — RX RENEWAL (OUTPATIENT)
Age: 71
End: 2021-04-05

## 2021-04-05 RX ORDER — LISINOPRIL 20 MG/1
20 TABLET ORAL DAILY
Qty: 90 | Refills: 3 | Status: ACTIVE | COMMUNITY
Start: 2021-04-05 | End: 1900-01-01

## 2021-06-14 NOTE — ED ADULT NURSE NOTE - NS ED PATIENT SAFETY CONCERN
Bactrim Pregnancy And Lactation Text: This medication is Pregnancy Category D and is known to cause fetal risk.  It is also excreted in breast milk. Sarecycline Pregnancy And Lactation Text: This medication is Pregnancy Category D and not consider safe during pregnancy. It is also excreted in breast milk. Benzoyl Peroxide Counseling: Patient counseled that medicine may cause skin irritation and bleach clothing.  In the event of skin irritation, the patient was advised to reduce the amount of the drug applied or use it less frequently.   The patient verbalized understanding of the proper use and possible adverse effects of benzoyl peroxide.  All of the patient's questions and concerns were addressed. Use Enhanced Medication Counseling?: No Tazorac Pregnancy And Lactation Text: This medication is not safe during pregnancy. It is unknown if this medication is excreted in breast milk. Dapsone Counseling: I discussed with the patient the risks of dapsone including but not limited to hemolytic anemia, agranulocytosis, rashes, methemoglobinemia, kidney failure, peripheral neuropathy, headaches, GI upset, and liver toxicity.  Patients who start dapsone require monitoring including baseline LFTs and weekly CBCs for the first month, then every month thereafter.  The patient verbalized understanding of the proper use and possible adverse effects of dapsone.  All of the patient's questions and concerns were addressed. No Topical Retinoid counseling:  Patient advised to apply a pea-sized amount only at bedtime and wait 30 minutes after washing their face before applying.  If too drying, patient may add a non-comedogenic moisturizer. The patient verbalized understanding of the proper use and possible adverse effects of retinoids.  All of the patient's questions and concerns were addressed. Topical Sulfur Applications Counseling: Topical Sulfur Counseling: Patient counseled that this medication may cause skin irritation or allergic reactions.  In the event of skin irritation, the patient was advised to reduce the amount of the drug applied or use it less frequently.   The patient verbalized understanding of the proper use and possible adverse effects of topical sulfur application.  All of the patient's questions and concerns were addressed. Spironolactone Counseling: Patient advised regarding risks of diarrhea, abdominal pain, hyperkalemia, birth defects (for female patients), liver toxicity and renal toxicity. The patient may need blood work to monitor liver and kidney function and potassium levels while on therapy. The patient verbalized understanding of the proper use and possible adverse effects of spironolactone.  All of the patient's questions and concerns were addressed. Detail Level: Zone Birth Control Pills Pregnancy And Lactation Text: This medication should be avoided if pregnant and for the first 30 days post-partum. Doxycycline Pregnancy And Lactation Text: This medication is Pregnancy Category D and not consider safe during pregnancy. It is also excreted in breast milk but is considered safe for shorter treatment courses. Bactrim Counseling:  I discussed with the patient the risks of sulfa antibiotics including but not limited to GI upset, allergic reaction, drug rash, diarrhea, dizziness, photosensitivity, and yeast infections.  Rarely, more serious reactions can occur including but not limited to aplastic anemia, agranulocytosis, methemoglobinemia, blood dyscrasias, liver or kidney failure, lung infiltrates or desquamative/blistering drug rashes. Dapsone Pregnancy And Lactation Text: This medication is Pregnancy Category C and is not considered safe during pregnancy or breast feeding. Isotretinoin Pregnancy And Lactation Text: This medication is Pregnancy Category X and is considered extremely dangerous during pregnancy. It is unknown if it is excreted in breast milk. Isotretinoin Counseling: Patient should get monthly blood tests, not donate blood, not drive at night if vision affected, not share medication, and not undergo elective surgery for 6 months after tx completed. Side effects reviewed, pt to contact office should one occur. Azithromycin Pregnancy And Lactation Text: This medication is considered safe during pregnancy and is also secreted in breast milk. Topical Retinoid Pregnancy And Lactation Text: This medication is Pregnancy Category C. It is unknown if this medication is excreted in breast milk. Spironolactone Pregnancy And Lactation Text: This medication can cause feminization of the male fetus and should be avoided during pregnancy. The active metabolite is also found in breast milk. Benzoyl Peroxide Pregnancy And Lactation Text: This medication is Pregnancy Category C. It is unknown if benzoyl peroxide is excreted in breast milk. Azithromycin Counseling:  I discussed with the patient the risks of azithromycin including but not limited to GI upset, allergic reaction, drug rash, diarrhea, and yeast infections. Topical Clindamycin Pregnancy And Lactation Text: This medication is Pregnancy Category B and is considered safe during pregnancy. It is unknown if it is excreted in breast milk. Birth Control Pills Counseling: Birth Control Pill Counseling: I discussed with the patient the potential side effects of OCPs including but not limited to increased risk of stroke, heart attack, thrombophlebitis, deep venous thrombosis, hepatic adenomas, breast changes, GI upset, headaches, and depression.  The patient verbalized understanding of the proper use and possible adverse effects of OCPs. All of the patient's questions and concerns were addressed. Erythromycin Counseling:  I discussed with the patient the risks of erythromycin including but not limited to GI upset, allergic reaction, drug rash, diarrhea, increase in liver enzymes, and yeast infections. Tazorac Counseling:  Patient advised that medication is irritating and drying.  Patient may need to apply sparingly and wash off after an hour before eventually leaving it on overnight.  The patient verbalized understanding of the proper use and possible adverse effects of tazorac.  All of the patient's questions and concerns were addressed. Erythromycin Pregnancy And Lactation Text: This medication is Pregnancy Category B and is considered safe during pregnancy. It is also excreted in breast milk. Topical Sulfur Applications Pregnancy And Lactation Text: This medication is Pregnancy Category C and has an unknown safety profile during pregnancy. It is unknown if this topical medication is excreted in breast milk. High Dose Vitamin A Pregnancy And Lactation Text: High dose vitamin A therapy is contraindicated during pregnancy and breast feeding. Minocycline Counseling: Patient advised regarding possible photosensitivity and discoloration of the teeth, skin, lips, tongue and gums.  Patient instructed to avoid sunlight, if possible.  When exposed to sunlight, patients should wear protective clothing, sunglasses, and sunscreen.  The patient was instructed to call the office immediately if the following severe adverse effects occur:  hearing changes, easy bruising/bleeding, severe headache, or vision changes.  The patient verbalized understanding of the proper use and possible adverse effects of minocycline.  All of the patient's questions and concerns were addressed. Tetracycline Counseling: Patient counseled regarding possible photosensitivity and increased risk for sunburn.  Patient instructed to avoid sunlight, if possible.  When exposed to sunlight, patients should wear protective clothing, sunglasses, and sunscreen.  The patient was instructed to call the office immediately if the following severe adverse effects occur:  hearing changes, easy bruising/bleeding, severe headache, or vision changes.  The patient verbalized understanding of the proper use and possible adverse effects of tetracycline.  All of the patient's questions and concerns were addressed. Patient understands to avoid pregnancy while on therapy due to potential birth defects. Sarecycline Counseling: Patient advised regarding possible photosensitivity and discoloration of the teeth, skin, lips, tongue and gums.  Patient instructed to avoid sunlight, if possible.  When exposed to sunlight, patients should wear protective clothing, sunglasses, and sunscreen.  The patient was instructed to call the office immediately if the following severe adverse effects occur:  hearing changes, easy bruising/bleeding, severe headache, or vision changes.  The patient verbalized understanding of the proper use and possible adverse effects of sarecycline.  All of the patient's questions and concerns were addressed. Doxycycline Counseling:  Patient counseled regarding possible photosensitivity and increased risk for sunburn.  Patient instructed to avoid sunlight, if possible.  When exposed to sunlight, patients should wear protective clothing, sunglasses, and sunscreen.  The patient was instructed to call the office immediately if the following severe adverse effects occur:  hearing changes, easy bruising/bleeding, severe headache, or vision changes.  The patient verbalized understanding of the proper use and possible adverse effects of doxycycline.  All of the patient's questions and concerns were addressed. High Dose Vitamin A Counseling: Side effects reviewed, pt to contact office should one occur. Topical Clindamycin Counseling: Patient counseled that this medication may cause skin irritation or allergic reactions.  In the event of skin irritation, the patient was advised to reduce the amount of the drug applied or use it less frequently.   The patient verbalized understanding of the proper use and possible adverse effects of clindamycin.  All of the patient's questions and concerns were addressed.

## 2021-11-01 NOTE — ED ADULT TRIAGE NOTE - PRO INTERPRETER NEED 2
Caller: Patient's    pregnancy, Pain/ ED Follow up    Details of condition: Patient's  Jayant calling stating that patient was seen in L&D last night for back pain. Jayant stating that they did send her home with some tylenol, but she is still having a lot of pain. Jayant stating that no US was done. Jayant stating that the pain is now radiating to her front and they are unsure what else to do. Please call Jayant and advise.   Pharmacy verified? No  Appointment offered? No  Best time to reach patient: ASAP  Patient would like a call back at [phone]   (enter if call back number is different from primary phone number)            
Spoke to patient regarding her recent labor and delivery visit.  Urine is clear and yellow.  Back pain on right side and goes to front under ribs.  She vomited last night. +FM.  Told to continue to monitor since the patient is not in pain now.  No further questions.  
English

## 2022-01-31 ENCOUNTER — EMERGENCY (EMERGENCY)
Facility: HOSPITAL | Age: 72
LOS: 1 days | Discharge: HOME | End: 2022-01-31
Attending: EMERGENCY MEDICINE | Admitting: EMERGENCY MEDICINE
Payer: COMMERCIAL

## 2022-01-31 VITALS
OXYGEN SATURATION: 100 % | TEMPERATURE: 98 F | DIASTOLIC BLOOD PRESSURE: 79 MMHG | RESPIRATION RATE: 18 BRPM | SYSTOLIC BLOOD PRESSURE: 165 MMHG | HEART RATE: 73 BPM

## 2022-01-31 VITALS
HEART RATE: 83 BPM | RESPIRATION RATE: 16 BRPM | SYSTOLIC BLOOD PRESSURE: 162 MMHG | HEIGHT: 71 IN | OXYGEN SATURATION: 97 % | WEIGHT: 259.93 LBS | DIASTOLIC BLOOD PRESSURE: 96 MMHG | TEMPERATURE: 96 F

## 2022-01-31 DIAGNOSIS — Z95.1 PRESENCE OF AORTOCORONARY BYPASS GRAFT: Chronic | ICD-10-CM

## 2022-01-31 DIAGNOSIS — Z95.4 PRESENCE OF OTHER HEART-VALVE REPLACEMENT: Chronic | ICD-10-CM

## 2022-01-31 PROCEDURE — 99284 EMERGENCY DEPT VISIT MOD MDM: CPT | Mod: 25

## 2022-01-31 PROCEDURE — 70450 CT HEAD/BRAIN W/O DYE: CPT | Mod: 26,MA

## 2022-01-31 PROCEDURE — 70450 CT HEAD/BRAIN W/O DYE: CPT | Mod: MA

## 2022-01-31 PROCEDURE — 73030 X-RAY EXAM OF SHOULDER: CPT | Mod: 26,RT

## 2022-01-31 PROCEDURE — 72100 X-RAY EXAM L-S SPINE 2/3 VWS: CPT

## 2022-01-31 PROCEDURE — 99285 EMERGENCY DEPT VISIT HI MDM: CPT

## 2022-01-31 PROCEDURE — 72100 X-RAY EXAM L-S SPINE 2/3 VWS: CPT | Mod: 26

## 2022-01-31 PROCEDURE — 73030 X-RAY EXAM OF SHOULDER: CPT

## 2022-01-31 RX ORDER — ACETAMINOPHEN 500 MG
650 TABLET ORAL ONCE
Refills: 0 | Status: COMPLETED | OUTPATIENT
Start: 2022-01-31 | End: 2022-01-31

## 2022-01-31 RX ADMIN — Medication 650 MILLIGRAM(S): at 14:15

## 2022-01-31 RX ADMIN — Medication 650 MILLIGRAM(S): at 13:45

## 2022-01-31 NOTE — ED PROVIDER NOTE - CONSTITUTIONAL, MLM
Well appearing, awake, alert, oriented to person, place, time/situation and in no apparent distress. nc/at normal...

## 2022-01-31 NOTE — ED PROVIDER NOTE - CCCP TRG CHIEF CMPLNT
Felicitas Coleman returns to the ED with continued vomiting. Patient aox4, moaning/tearful on arrival. Patient states she went home from the ED Saturday and felt well, was able to eat. Sunday morning, vomiting persisted with abdominal cramping due to the vomiting.   
fall

## 2022-01-31 NOTE — ED PROVIDER NOTE - MUSCULOSKELETAL, MLM
Spine appears normal, range of motion is not limited, mild pain with rom of right shoulder nvi b/l paralumbar ttp no midline nrom normal straight leg raise normal strength

## 2022-01-31 NOTE — ED ADULT NURSE NOTE - NSIMPLEMENTINTERV_GEN_ALL_ED
Implemented All Fall Risk Interventions:  [place] to call system. Call bell, personal items and telephone within reach. Instruct patient to call for assistance. Room bathroom lighting operational. Non-slip footwear when patient is off stretcher. Physically safe environment: no spills, clutter or unnecessary equipment. Stretcher in lowest position, wheels locked, appropriate side rails in place. Provide visual cue, wrist band, yellow gown, etc. Monitor gait and stability. Monitor for mental status changes and reorient to person, place, and time. Review medications for side effects contributing to fall risk. Reinforce activity limits and safety measures with patient and family.

## 2022-01-31 NOTE — ED ADULT NURSE NOTE - CAS ELECT INFOMATION PROVIDED
Safety when ambulating on ice/ MD follow up Safety when ambulating on ice/ MD follow up/DC instructions

## 2022-01-31 NOTE — ED PROVIDER NOTE - ATTENDING CONTRIBUTION TO CARE
72 yo M p/w slip and fall on ice today in parking lot , pt states hit head, R shoulder. Pt co pain to back and R shoulder. Mild HA. no n/v/dizzy. no neck pain. No LOC. NO numb/ting/focal weak. No other recent trauma. Pt sp covid, not vaccinated. no other acute co.  Exam: MM Moist, no ext signs of head trauma. no midline spinal tend (c,t,l). Mild L paraspinal tend. R shoulder , FROM with some pain, no deformity. Nl dist str/sens equal bl, 2+ pulses. nl cap refill. no other acute findings. non-focal neuro exam  check CT, XR, outpt fu

## 2022-01-31 NOTE — ED PROVIDER NOTE - NSFOLLOWUPINSTRUCTIONS_ED_ALL_ED_FT
take tylenol or Motrin for pain  follow up with pcp and orthopedics  return to er for any worsening symptoms     Head Injury    WHAT YOU NEED TO KNOW:    A head injury can include your scalp, face, skull, or brain and range from mild to severe. Effects can appear immediately after the injury or develop later. The effects may last a short time or be permanent. Healthcare providers may want to check your recovery over time. Treatment may change as you recover or develop new health problems from the head injury.    DISCHARGE INSTRUCTIONS:    Call your local emergency number (911 in the ), or have someone else call if:   •You cannot be woken.      •You have a seizure.      •You stop responding to others or you faint.      •You have blurry or double vision.      •Your speech becomes slurred or confused.      •You have arm or leg weakness, loss of feeling, or new problems with coordination.      •Your pupils are larger than usual, or one pupil is a different size than the other.      •You have blood or clear fluid coming out of your ears or nose.      Return to the emergency department if:   •You have repeated or forceful vomiting.      •You feel confused.      •Your headache gets worse or becomes severe.      •You or someone caring for you notices that you are harder to wake than usual.      Call your doctor if:   •Your symptoms last longer than 6 weeks after the injury.      •You have questions or concerns about your condition or care.      Medicines:   •Acetaminophen decreases pain and fever. It is available without a doctor's order. Ask how much to take and how often to take it. Follow directions. Read the labels of all other medicines you are using to see if they also contain acetaminophen, or ask your doctor or pharmacist. Acetaminophen can cause liver damage if not taken correctly. Do not use more than 4 grams (4,000 milligrams) total of acetaminophen in one day.       •Take your medicine as directed. Contact your healthcare provider if you think your medicine is not helping or if you have side effects. Tell him or her if you are allergic to any medicine. Keep a list of the medicines, vitamins, and herbs you take. Include the amounts, and when and why you take them. Bring the list or the pill bottles to follow-up visits. Carry your medicine list with you in case of an emergency.      Self-care:   •Rest or do quiet activities. Limit your time watching TV, using the computer, or doing tasks that require a lot of thinking. Slowly return to your normal activities as directed. Do not play sports or do activities that may cause you to get hit in the head. Ask your healthcare provider when you can return to sports.      •Apply ice on your head for 15 to 20 minutes every hour or as directed. Use an ice pack, or put crushed ice in a plastic bag. Cover it with a towel before you apply it to your skin. Ice helps prevent tissue damage and decreases swelling and pain.      •Have someone stay with you for 24 hours , or as directed. This person can monitor you for problems and call for help if needed. When you are awake, the person should ask you a few questions every few hours to see if you are thinking clearly. An example is to ask your name or address.      Prevent another head injury:   •Wear a helmet that fits properly. Do this when you play sports, or ride a bike, scooter, or skateboard. Helmets help decrease your risk for a serious head injury. Talk to your healthcare provider about other ways you can protect yourself if you play sports.      •Wear your seatbelt every time you are in a car. This helps lower your risk for a head injury if you are in a car accident.      Follow up with your doctor as directed: Write down your questions so you remember to ask them during your visits.

## 2022-01-31 NOTE — ED PROVIDER NOTE - OBJECTIVE STATEMENT
Pt is a 72 yo male with pmhx of HTN DM diverticulitis MI CABG HLD valve replacement here s/p slip and fall due to ice in work parking lot pta. Pt states he is not on any blood thinner states stopped on his own a while back even though he was prescribed it. Pt denies any loc also c/o of back pain and right shoulder pain no chest pain sob nvd abdominal pain.

## 2022-01-31 NOTE — ED PROVIDER NOTE - CARE PROVIDER_API CALL
Calos Rowley  ORTHOPAEDIC SURGERY  [address] [place] [address]  Phone: [phone]  Fax: [phone]  Follow Up Time:

## 2022-01-31 NOTE — ED PROVIDER NOTE - CARE PLAN
1 Principal Discharge DX:	Fall  Secondary Diagnosis:	Back pain  Secondary Diagnosis:	Shoulder pain, right  Secondary Diagnosis:	Head injury

## 2022-01-31 NOTE — ED ADULT NURSE NOTE - OBJECTIVE STATEMENT
Patient A/Ox4 with a steady gait. Patient fell on ice, says he hit his head. Denies LOC, dizziness or blurry vision. Denies AC therapy. Also c/o right shoulder pain, back pain and right hand abrasion. DSD on right hand. Call light in reach. Patient A/Ox4 with a steady gait. Patient fell on ice outside place of work and says he hit his head. Denies LOC, dizziness or blurry vision. Denies AC therapy. Also c/o right shoulder pain, back pain and right hand abrasion. DSD on right hand. Call light in reach.

## 2022-01-31 NOTE — ED ADULT TRIAGE NOTE - STATUS:
The patient is stating that she stopped taking her zoloft 100 mg. She states it was making her nauseas and threw up . She has not taken it for 2 days and she is fine now. She would like a different prescription. The patient is aware that Dr. Jones and staff are off today.   Intact

## 2022-01-31 NOTE — ED PROVIDER NOTE - PATIENT PORTAL LINK FT
You can access the FollowMyHealth Patient Portal offered by [facility] by registering at the following website: [email or web address]. By joining [employer]’s FollowMyHealth portal, you will also be able to view your health information using other applications (apps) compatible with our system.

## 2022-03-09 ENCOUNTER — TRANSCRIPTION ENCOUNTER (OUTPATIENT)
Age: 72
End: 2022-03-09

## 2022-03-11 ENCOUNTER — APPOINTMENT (OUTPATIENT)
Dept: OPHTHALMOLOGY | Facility: CLINIC | Age: 72
End: 2022-03-11
Payer: MEDICARE

## 2022-03-11 ENCOUNTER — NON-APPOINTMENT (OUTPATIENT)
Age: 72
End: 2022-03-11

## 2022-03-11 PROCEDURE — 65222 REMOVE FOREIGN BODY FROM EYE: CPT | Mod: RT

## 2022-03-11 PROCEDURE — 92004 COMPRE OPH EXAM NEW PT 1/>: CPT

## 2022-07-05 NOTE — OCCUPATIONAL THERAPY INITIAL EVALUATION ADULT - LEVEL OF INDEPENDENCE: GROOMING, OT EVAL
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      Kourtney Glass MD, 07/05/22, 8:40 AM
independent

## 2022-11-18 NOTE — ED ADULT TRIAGE NOTE - SOURCE OF INFORMATION
Physical Therapy Treatment Note/Plan of Care    Room #:  6683/2635-76  Patient Name: Chichi Vicente  YOB: 1934  MRN: [id number]    Date of Service: 11/18/2022     Tentative placement recommendation: Home Health Physical Therapy   Equipment recommendation: Nelsy Urbano       Evaluating Physical Therapist: Lv Renteria, [facility] #[id number]      Specific Provider Orders/Date/Referring Provider :     11/15/22 1500    PT eval and treat  Start:  11/15/22 1500,   End:  11/15/22 1500,   ONE TIME,   Standing Count:  1 Occurrences,   R         Moira Moreno DO     Admitting Diagnosis:   Cardiomegaly [I51.7]  Urinary retention [R33.9]  COPD exacerbation ([address] [J44.1]  Bilateral pleural effusion [J90]  Pleural effusion, right [J90]  Acute on chronic congestive heart failure, unspecified heart failure type ([address] [I50.9]      Surgery: none  Visit Diagnoses         Codes    Bilateral pleural effusion    -  Primary J90    COPD exacerbation ([address]     J44.1    Urinary retention     R33.9    Cardiomegaly     I51.7    Acute on chronic congestive heart failure, unspecified heart failure type ([address]     I50.9    Postprocedural pneumothorax     J95.811            Patient Active Problem List   Diagnosis    Type 2 diabetes mellitus without complication, without long-term current use of insulin (HCC)    Mixed hyperlipidemia    Essential hypertension    Osteoarthritis    RLS (restless legs syndrome)    Chronic obstructive pulmonary disease (HCC)    Diabetic polyneuropathy associated with diabetes mellitus due to underlying condition (HCC)    Bilateral pleural effusion    Acute on chronic congestive heart failure (HCC)    Urinary retention    Abnormal cardiovascular stress test    Hypotension    COPD exacerbation (HCC)        ASSESSMENT of Current Deficits Patient able to ambulate on room air and her SPO2 was 92-93% throughout tx session using a wheeled walker. Patient needing a seated rest period due to impaired endurance. The patient will benefit from continued skilled therapy to increase strength and improve balance for safe functional mobility, to decrease risk of falls, and to meet goals at discharge. PHYSICAL THERAPY  PLAN OF CARE       Physical therapy plan of care is established based on physician order,  patient diagnosis and clinical assessment    Current Treatment Recommendations:    -Bed Mobility: Lower extremity exercises   -Sitting Balance: Incorporate reaching activities to activate trunk muscles   -Standing Balance: Perform strengthening exercises in standing to promote motor control with or without upper extremity support   -Transfers: Provide instruction on proper hand and foot position for adequate transfer of weight onto lower extremities and use of gait device if needed and Cues for hand placement, technique and safety. Provide stabilization to prevent fall   -Gait: Gait training and Standing activities to improve: base of support, weight shift, weight bearing    -Endurance: Utilize Supervised activities to increase level of endurance to allow for safe functional mobility including transfers and gait   -Stairs: Stair training with instruction on proper technique and hand placement on rail    PT long term treatment goals are located in below grid    Patient and or family understand(s) diagnosis, prognosis, and plan of care. Frequency of treatments: Patient will be seen  daily.          Prior Level of Function: Patient ambulated with cane   Rehab Potential: good  for baseline    Past medical history:   Past Medical History:   Diagnosis Date    COPD (chronic obstructive pulmonary disease) ([facility]     H/O cardiovascular stress test 11/17/2022    Lexiscan    Hyperlipidemia     Hypertension     Osteoarthritis     Type II or unspecified type diabetes mellitus without mention of complication, not stated as uncontrolled      Past Surgical History:   Procedure Laterality Date    [address] [facility] SECTION  1964 ,1971 COLONOSCOPY  2003    TUMOR REMOVAL  1954    under lt arm       SUBJECTIVE:    Precautions: Up as tolerated, falls and O2 ,  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    Social history: Patient lives with spouse in a two story home resides first  with 3 steps  to enter with Rail  Tub shower     Equipment owned: Cane, [address], Bedside commode, and Shower chair,      [address] [facility]   How much difficulty turning over in bed?: A Little  How much difficulty sitting down on / standing up from a chair with arms?: A Little  How much difficulty moving from lying on back to sitting on side of bed?: A Little  How much help from another person moving to and from a bed to a chair?: A Little  How much help from another person needed to walk in hospital room?: A Little  How much help from another person for climbing 3-5 steps with a railing?: A Little  AM-PAC Inpatient Mobility Raw Score : 18  AM-PAC Inpatient T-Scale Score : 43.63  Mobility Inpatient CMS 0-100% Score: 46.58  Mobility Inpatient CMS G-Code Modifier : CK    Nursing cleared patient for PT treatment. 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 OBJECTIVE;   Initial Evaluation  Date: 11/16/2022 Treatment Date:  11/18/2022       Short Term/ Long Term   Goals   Was pt agreeable to Eval/treatment? Yes yes To be met in 4 days   Pain level   0/10   0/10    Bed Mobility    Rolling: Not assessed patient in chair    Supine to sit: Not assessed patient in chair    Sit to supine: Not assessed patient in chair    Scooting: Not assessed patient in chair   Rolling: Not assessed patient in chair   Supine to sit: Not assessed patient in chair   Sit to supine: Not assessed patient in chair   Scooting: Not assessed patient in chair    Rolling: Independent    Supine to sit:  Independent    Sit to supine: Independent    Scooting: Independent     Transfers Sit to stand: Minimal assist of 1 cues for hand placement  Sit to stand: Minimal assist of 1 Cues for hand placement and safety     Sit to stand: Independent    Ambulation     75, 25 feet using  wheeled walker with Minimal assist of 1   for walker control, balance, upright, and O2 line management  and cues for upright posture and safety 2 x 90 feet using  wheeled walker with Minimal assist of 1   cues for upright posture, safety, pacing, and pursed lip breathing     150 feet using  wheeled walker with Modified Independent    Stair negotiation: ascended and descended   Not assessed     3 steps with hr and supervision    ROM Within functional limits    Increase range of motion 10% of affected joints    Strength BUE:  refer to OT eval  RLE:  4-/5  LLE:  4-/5  Increase strength in affected mm groups by 1/3 grade   Balance Sitting EOB:  not assessed   Dynamic Standing:  fair wheeled walker   Sitting EOB/chair: good   Dynamic Standing: fair wheeled walker   Sitting EOB:  good   Dynamic Standing: good      Patient is Alert & Oriented x person, place, time, and situation and follows directions    Sensation:  Patient  denies numbness/tingling   Edema:  no   Endurance: fair      Vitals:  2 liters nasal cannula   Blood Pressure at rest  Blood Pressure during session    Heart Rate at rest  Heart Rate during session    SPO2 at rest 94%  SPO2 during session 92-93% on room air     Patient education  Patient educated on role of Physical Therapy, risks of immobility, safety and plan of care,  importance of mobility while in hospital , purse lip breathing, ankle pumps, quad set and glut set for edema control, blood clot prevention, importance and purpose of adaptive device and adjusted to proper height for the patient. , and safety      Patient response to education:   Pt verbalized understanding Pt demonstrated skill Pt requires further education in this area   Yes Partial Yes      Treatment:  Patient practiced and was instructed/facilitated in the following treatment: Patient in chair.  Pt educated on criteria for home O2 and the walking test on room air because nursing asked me to do so. Pt stood and ambulated out into hallway with wheeled walker. Pt needed a seated rest period in the waiting room, instructed on pursed lip breathing and pacing, she stood, ambulated back to the chair in her room. Pt performed seated exercises. Therapeutic Exercises:  ankle pumps, heel raises, hip abduction/adduction, long arc quad, and seated marching,  2 x 20 reps. AROM     At end of session, patient in chair with  .  call light and phone within reach,  all lines and tubes intact, nursing notified. Patient would benefit from continued skilled Physical Therapy to improve functional independence and quality of life. Patient's/ family goals   home    Time in  10:30  Time out 11:08    Total Treatment Time  38 minutes        CPT codes:    Therapeutic activities (83883)   25 minutes  2 unit(s)  Therapeutic exercises (06739)   13 minutes  1 unit(s)    Tyler Wang  [facility] [place]  LIC # [id number] EMS

## 2022-12-13 ENCOUNTER — NON-APPOINTMENT (OUTPATIENT)
Age: 72
End: 2022-12-13

## 2022-12-29 ENCOUNTER — NON-APPOINTMENT (OUTPATIENT)
Age: 72
End: 2022-12-29

## 2023-04-09 ENCOUNTER — EMERGENCY (EMERGENCY)
Facility: HOSPITAL | Age: 73
LOS: 1 days | Discharge: HOME | End: 2023-04-09
Attending: EMERGENCY MEDICINE
Payer: MEDICARE

## 2023-04-09 VITALS
OXYGEN SATURATION: 98 % | DIASTOLIC BLOOD PRESSURE: 91 MMHG | RESPIRATION RATE: 16 BRPM | SYSTOLIC BLOOD PRESSURE: 144 MMHG | TEMPERATURE: 99 F | HEART RATE: 69 BPM

## 2023-04-09 VITALS
TEMPERATURE: 99 F | DIASTOLIC BLOOD PRESSURE: 89 MMHG | HEIGHT: 71 IN | HEART RATE: 97 BPM | OXYGEN SATURATION: 99 % | RESPIRATION RATE: 20 BRPM | WEIGHT: 220.02 LBS | SYSTOLIC BLOOD PRESSURE: 174 MMHG

## 2023-04-09 DIAGNOSIS — Z95.1 PRESENCE OF AORTOCORONARY BYPASS GRAFT: Chronic | ICD-10-CM

## 2023-04-09 DIAGNOSIS — Z95.4 PRESENCE OF OTHER HEART-VALVE REPLACEMENT: Chronic | ICD-10-CM

## 2023-04-09 PROCEDURE — 71250 CT THORAX DX C-: CPT | Mod: MA

## 2023-04-09 PROCEDURE — 74176 CT ABD & PELVIS W/O CONTRAST: CPT | Mod: 26,MA

## 2023-04-09 PROCEDURE — 74176 CT ABD & PELVIS W/O CONTRAST: CPT | Mod: MA

## 2023-04-09 PROCEDURE — 71250 CT THORAX DX C-: CPT | Mod: 26,MA

## 2023-04-09 PROCEDURE — 99284 EMERGENCY DEPT VISIT MOD MDM: CPT

## 2023-04-09 PROCEDURE — 72125 CT NECK SPINE W/O DYE: CPT | Mod: 26,MA

## 2023-04-09 PROCEDURE — 99284 EMERGENCY DEPT VISIT MOD MDM: CPT | Mod: 25

## 2023-04-09 PROCEDURE — 70450 CT HEAD/BRAIN W/O DYE: CPT | Mod: 26,MA

## 2023-04-09 PROCEDURE — 70450 CT HEAD/BRAIN W/O DYE: CPT | Mod: MA

## 2023-04-09 PROCEDURE — 72125 CT NECK SPINE W/O DYE: CPT | Mod: MA

## 2023-04-09 RX ORDER — ACETAMINOPHEN 500 MG
975 TABLET ORAL ONCE
Refills: 0 | Status: COMPLETED | OUTPATIENT
Start: 2023-04-09 | End: 2023-04-09

## 2023-04-09 RX ORDER — IBUPROFEN 200 MG
600 TABLET ORAL ONCE
Refills: 0 | Status: DISCONTINUED | OUTPATIENT
Start: 2023-04-09 | End: 2023-04-17

## 2023-04-09 RX ADMIN — Medication 975 MILLIGRAM(S): at 17:19

## 2023-04-09 RX ADMIN — Medication 975 MILLIGRAM(S): at 19:30

## 2023-04-09 NOTE — ED STATDOCS - CARE PROVIDERS DIRECT ADDRESSES
,[email or web address]

## 2023-04-09 NOTE — ED STATDOCS - NS ED ATTENDING STATEMENT MOD
This was a shared visit with the ALCIDES. I reviewed and verified the documentation and independently performed the documented:

## 2023-04-09 NOTE — ED STATDOCS - ATTENDING APP SHARED VISIT CONTRIBUTION OF CARE
I, Jan Live, performed the initial face to face bedside interview with this patient regarding history of present illness, review of symptoms and relevant past medical, social and family history.  I completed an independent physical examination.  I was the initial provider who evaluated this patient. I have signed out the follow up of any pending tests (i.e. labs, radiological studies) to the ACP.  I have communicated the patient’s plan of care and disposition with the ACP.  The history, relevant review of systems, past medical and surgical history, medical decision making, and physical examination was documented by the scribe in my presence and I attest to the accuracy of the documentation.

## 2023-04-09 NOTE — ED ADULT TRIAGE NOTE - CHIEF COMPLAINT QUOTE
trip and fall yesterday morning, abrasion to left hand, pain to left ribs. denies head injury or LOC

## 2023-04-09 NOTE — ED STATDOCS - CLINICAL SUMMARY MEDICAL DECISION MAKING FREE TEXT BOX
71 y/o male presenting s/p slip and fall w/ anterior chest wall tenderness. As pt reports blood thinner use and mild cervical pain on the morning of visit, will obtain CT and pain control. 71 y/o male presenting s/p slip and fall w/ anterior chest wall tenderness. As pt reports blood thinner use and mild cervical pain on the morning of visit, will obtain CT and pain control.    Pt with no evidence of acute fractures or bleed, stable compression fx of L1, Right hip arthroplasty, metallic streak artifact limits detailed evaluation the pelvis and adjacent structures. soft tissue swelling of right hip but pt able to walk, Pt reassessed, pt feeling better at this time, vss, pt able to walk, talk and vocalized plan of action. Discussed in depth and explained to pt in depth the next steps that need to be taking including proper follow up with PCP or specialists. All incidental findings were discussed with pt as well. Pt verbalized their concerns and all questions were answered. Pt understands dispo and wants discharge. Given good instructions when to return to ED and importance of f/u.

## 2023-04-09 NOTE — ED STATDOCS - CARE PROVIDER_API CALL
Gordo Watkins ()  Orthopaedic Surgery  [address] [place] [id number]  Phone: [phone]  Fax: [phone]  Follow Up Time:     Jaime Iraheta)  Orthopedics  [address] [place] [address], 1st Floor  [place], [place] [id number]  Phone: [phone]  Fax: [phone]  Follow Up Time:

## 2023-04-09 NOTE — ED STATDOCS - OBJECTIVE STATEMENT
73 y/o male on blood thinners presenting s/p trip and fall after carrying saw from back of truck. Reports ts tripped in the dark and fell over with the saw. Pt notes fell onto the saw w/ contact to chest and left hand. Otherwise denies head injury and notes woke up this morning with cervical pain which has since dissipated.

## 2023-04-09 NOTE — ED ADULT NURSE REASSESSMENT NOTE - NS ED NURSE REASSESS COMMENT FT1
pt received from Branden CONNER at 1930. no s/s of acute distress, pt resting comfortably on stretcher. pt denies chest pain/pressure/tightness, palpitations, sob, dizziness/headache/lightheadedness/blurry vision, tingling/numbness, fevers/chills, n/v/d. s/p mechanical fall yesterday, presents to ED c/o pain to right side of anterior chest wall. open wound present to left palm of hand. pt states, "I could be difficult to breath in". respirations even. pain controlled at this time as per pt. awaiting CT scan. plan of care reviewed with pt. pt in understanding of plan of care.

## 2023-04-09 NOTE — ED STATDOCS - PATIENT PORTAL LINK FT
You can access the FollowMyHealth Patient Portal offered by [facility] by registering at the following website: [email or web address]. By joining [employer]’s FollowMyHealth portal, you will also be able to view your health information using other applications (apps) compatible with our system.

## 2023-04-14 ENCOUNTER — APPOINTMENT (OUTPATIENT)
Dept: ORTHOPEDIC SURGERY | Facility: CLINIC | Age: 73
End: 2023-04-14
Payer: MEDICARE

## 2023-04-14 VITALS
HEIGHT: 70 IN | WEIGHT: 245 LBS | HEART RATE: 94 BPM | SYSTOLIC BLOOD PRESSURE: 154 MMHG | BODY MASS INDEX: 35.07 KG/M2 | DIASTOLIC BLOOD PRESSURE: 90 MMHG

## 2023-04-14 DIAGNOSIS — Z96.649 PRESENCE OF UNSPECIFIED ARTIFICIAL HIP JOINT: ICD-10-CM

## 2023-04-14 DIAGNOSIS — S20.219A CONTUSION OF UNSPECIFIED FRONT WALL OF THORAX, INITIAL ENCOUNTER: ICD-10-CM

## 2023-04-14 PROCEDURE — 99204 OFFICE O/P NEW MOD 45 MIN: CPT

## 2023-04-16 PROBLEM — S20.219A RIB CONTUSION: Status: ACTIVE | Noted: 2023-04-16

## 2023-06-13 NOTE — CONSULT NOTE ADULT - ASSESSMENT
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 She believes the topical desonide is contributing to her flares. Pt would like to discuss options of treatment today.

## 2024-01-18 NOTE — ED ADULT NURSE NOTE - NS ED NOTE ABUSE SUSPICION NEGLECT YN
CRE balloon dilatation of the esophagus   15 mm Balloon inflated to 3 ATMs and held for 10 seconds.  16.5 mm Balloon inflated to 4.5 ATMs and held for 10 seconds.  18 mm Balloon inflated to 7 ATMs and held for 60 seconds.    No subcutaneous crepitus of the chest or cervical region was noted post dilatation.    No

## 2024-05-15 NOTE — PATIENT PROFILE ADULT - NSPROEDAREADYLEARN_GEN_A_NUR
Subjective   Patient ID: Marlee is a 47 year old female.    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 aBrnes     Health maintenance  Pap smear: 3/11/2022, plans to see gyn  Mammogram: did not go last year, ordered again  Colonoscopy: age 47  Non smoker  Dexa:age 67  Immunizations: Pfizer x 3, influ 2022, Tdap 2020     PMHx: Genital warts (stable off of suppressive therapy  Social Hx:non smoker, no etoh, marred works as a dentist, has 2 children  Surgical Hx: D&C, hysteroscopy, vein procedures in the past  Family Hx: M HLD, F HTN, p gf cancer  All: NKDA      Patient's medications, allergies, past medical, surgical, social and family histories were reviewed and updated as appropriate.    Review of Systems   Constitutional:  Negative for fatigue and fever.   Eyes:  Negative for visual disturbance.   Respiratory:  Negative for shortness of breath and wheezing.    Cardiovascular:  Negative for chest pain and palpitations.   Gastrointestinal:  Negative for blood in stool.   All other systems reviewed and are negative.      Objective  Visit Vitals  /70 (BP Location: LUE - Left upper extremity, Patient Position: Sitting)   Pulse 78   Ht 5' 4\" (1.626 m)   Wt 54.3 kg (119 lb 9.6 oz)   BMI 20.53 kg/m²     Objective   Physical Exam  Vitals and nursing note reviewed.   Constitutional:       General: She is not in acute distress.     Appearance: Normal appearance. She is well-developed. She is not  toxic-appearing or diaphoretic.   HENT:      Head: Normocephalic and atraumatic.      Right Ear: Hearing, tympanic membrane, ear canal and external ear normal. No decreased hearing noted.      Left Ear: Hearing, tympanic membrane, ear canal and external ear normal. No decreased hearing noted.      Nose: Nose normal.      Mouth/Throat:      Pharynx: Uvula midline.   Eyes:      Conjunctiva/sclera: Conjunctivae normal.      Pupils: Pupils are equal, round, and reactive to light.      Comments: PERRLA   Neck:      Thyroid: No thyroid mass or thyromegaly.   Cardiovascular:      Rate and Rhythm: Normal rate and regular rhythm.      Heart sounds: Normal heart sounds, S1 normal and S2 normal.   Pulmonary:      Effort: Pulmonary effort is normal. No accessory muscle usage, prolonged expiration or respiratory distress.      Breath sounds: Normal breath sounds. No rhonchi or rales.   Chest:      Comments: No acute concerns  Abdominal:      General: Bowel sounds are normal.      Palpations: Abdomen is soft.      Tenderness: There is no abdominal tenderness.   Musculoskeletal:      Right lower leg: No edema.      Left lower leg: No edema.      Comments: Has 5/5 strength in the upper and lower extremities,   Lymphadenopathy:      Cervical: No cervical adenopathy.   Skin:     General: Skin is warm.      Comments: No abnormal moles on face, back, arms, legs   Neurological:      Mental Status: She is alert and oriented to person, place, and time.      Cranial Nerves: No cranial nerve deficit.   Psychiatric:         Attention and Perception: Attention normal.         Mood and Affect: Mood normal.         Speech: Speech normal.         Behavior: Behavior normal. Behavior is cooperative.         Thought Content: Thought content normal.         Assessment & Plan  Diagnoses and associated orders for this visit:  1. Health maintenance examination  2. Encounter for screening mammogram for malignant neoplasm of breast  -     MAMMO SCREENING  BILATERAL W DIANA  3. Chromophobe adenoma  (CMD)  -     MRI BRAIN W WO CONTRAST  Refill sertraline 50 mg and states anxiety is under control   Reviewed labs from 5/4/24 with the patient    Patient Education  1.) Please call  schedule for a mammogram  2.) Please call  for the brain MRI    Dr. Dayan Garcia - colonoscopy  [facility], [place] [address]    Dr. Trudy Lozano  [facility]  [phone]    Office: [phone]    Patient education completed on disease process, etiology & prognosis.    Patient expresses understanding of the plan.    Proper usage and side effects of medications reviewed & discussed.    Return to clinic as clinically indicated as discussed with patient who verbalized understanding of & agreement with the plan.     Anisa Thao DO  5/15/2024   9:27 PM   none

## 2024-06-11 NOTE — ED ADULT TRIAGE NOTE - NSWEIGHTCALCTOOLDRUG_GEN_A_CORE
used Airway patent, Nasal mucosa clear. Mouth with normal mucosa. Throat has no vesicles, no oropharyngeal exudates and uvula is midline.

## 2024-06-17 ENCOUNTER — EMERGENCY (EMERGENCY)
Facility: HOSPITAL | Age: 74
LOS: 1 days | Discharge: HOME | End: 2024-06-17
Attending: STUDENT IN AN ORGANIZED HEALTH CARE EDUCATION/TRAINING PROGRAM | Admitting: STUDENT IN AN ORGANIZED HEALTH CARE EDUCATION/TRAINING PROGRAM
Payer: MEDICARE

## 2024-06-17 VITALS
HEART RATE: 70 BPM | RESPIRATION RATE: 20 BRPM | TEMPERATURE: 99 F | SYSTOLIC BLOOD PRESSURE: 143 MMHG | HEIGHT: 70 IN | OXYGEN SATURATION: 99 % | DIASTOLIC BLOOD PRESSURE: 81 MMHG | WEIGHT: 244.93 LBS

## 2024-06-17 VITALS
HEART RATE: 65 BPM | TEMPERATURE: 98 F | OXYGEN SATURATION: 97 % | DIASTOLIC BLOOD PRESSURE: 84 MMHG | RESPIRATION RATE: 20 BRPM | SYSTOLIC BLOOD PRESSURE: 144 MMHG

## 2024-06-17 DIAGNOSIS — Z95.1 PRESENCE OF AORTOCORONARY BYPASS GRAFT: Chronic | ICD-10-CM

## 2024-06-17 DIAGNOSIS — Z95.4 PRESENCE OF OTHER HEART-VALVE REPLACEMENT: Chronic | ICD-10-CM

## 2024-06-17 PROCEDURE — 99283 EMERGENCY DEPT VISIT LOW MDM: CPT

## 2024-06-17 RX ADMIN — Medication 100 MILLIGRAM(S): at 18:52

## 2024-06-17 NOTE — ED PROVIDER NOTE - PATIENT PORTAL LINK FT
You can access the FollowMyHealth Patient Portal offered by [facility] by registering at the following website: [email or web address]. By joining [employer]’s FollowMyHealth portal, you will also be able to view your health information using other applications (apps) compatible with our system.

## 2024-06-17 NOTE — ED PROVIDER NOTE - OBJECTIVE STATEMENT
Patient with a past medical history of hypertension, diabetes, coronary artery disease is presenting with left leg pain and swelling.  States that he has chronic lower extremity edema, and a few days ago he scratched his leg against a piece of machinery and developed a skin tear.  Since then he has had some clear drainage of fluid and redness but no purulent drainage noted.  States that he has been keeping it clean had no improvement symptoms so came to ED.  Denies any pain to the posterior calf, only on the anterior shin.  No fevers.

## 2024-06-17 NOTE — ED PROVIDER NOTE - CARE PROVIDER_API CALL
Lulu Thakkar  Internal Medicine  [address] [place] [id number]  Phone: [phone]  Fax: [phone]  Follow Up Time: 4-6 Days

## 2024-06-17 NOTE — ED ADULT TRIAGE NOTE - CHIEF COMPLAINT QUOTE
ambulatory to triage.  last thursday, I accidently rubbed my left leg against a piece of machinary and it ripped the skin off.  the weird thing is, it did not bleed, but instead, it poured out what looked like water.  I cleansed it and applied a dressing.  I have been changing the dressing daily.  the reason I am here is because the leg is swelling up more than normal and it does hurt.  I thought I could get away with caring for it on own, but not anymore.  my last tetanus shot was 4 months ago.  I am a type 2 dm

## 2024-06-17 NOTE — ED ADULT NURSE NOTE - CINV DISCH TEACH PARTICIP
----- Message from Yoli Snyder MD sent at 6/10/2020  7:50 AM CDT -----  Call   Lipids are not normal   Needs to continue with weight loss and exercise   Alk phosphatase is high , will do US liver   Needs to cut out alcohol  
Patient has been called and informed of Doctor's response. Order placed for Ultrasound.   
Patient is calling stating that he would like to re go over his test results as he didn't write everything down and would like to get more information. Please call patient back to discuss   
Patient

## 2024-06-17 NOTE — ED PROVIDER NOTE - CLINICAL SUMMARY MEDICAL DECISION MAKING FREE TEXT BOX
Concern for cellulitis of the left lower extremity given his clinical exam.  No calf pain to suggest DVT and he has known chronic bilateral lower extremity edema.  Patient otherwise well-appearing here and afebrile.  Will start antibiotics.  I advised patient to return to ER for wound check in a few days if he does not notice any improvement in symptoms.  Unless he is able to get in with his primary care doctor.  Advised to stay out of the sunlight as best as he can while on doxycycline.

## 2024-06-17 NOTE — ED PROVIDER NOTE - PHYSICAL EXAMINATION
Constitutional: Awake, Alert, non-toxic. No acute distress.  HEAD: Normocephalic, atraumatic.   EYES: EOM intact, conjunctiva and sclera are clear bilaterally.  ENT: External ears normal. No rhinorrhea, no tracheal deviation   NECK: Supple, non-tender  CARDIOVASCULAR: regular rate  RESPIRATORY: Normal respiratory effort; Speaking in full sentences. No accessory muscle use.   MSK:  no lower extremity edema, no deformities  SKIN: Warm, dry  NEURO: A&O x3. Sensory and motor functions are grossly intact. Speech is normal.  PSYCH: Appearance and judgement seem appropriate for gender and age. Constitutional: Awake, Alert, non-toxic. No acute distress.  HEAD: Normocephalic, atraumatic.   EYES: EOM intact, conjunctiva and sclera are clear bilaterally.  ENT: External ears normal. No rhinorrhea, no tracheal deviation   NECK: Supple, non-tender  CARDIOVASCULAR: regular rate  RESPIRATORY: Normal respiratory effort; Speaking in full sentences. No accessory muscle use.   SKIN: LLE erythema and clear drainage over anterior shin with no calf pain b/l  NEURO: A&O x3. Sensory and motor functions are grossly intact. Speech is normal.  PSYCH: Appearance and judgement seem appropriate for gender and age.

## 2024-06-17 NOTE — ED PROVIDER NOTE - NSFOLLOWUPINSTRUCTIONS_ED_ALL_ED_FT
Cellulitis, Adult     Cellulitis is a skin infection. The infected area is usually warm, red, swollen, and tender. This condition occurs most often in the arms and lower legs. The infection can travel to the muscles, blood, and underlying tissue and become serious. It is very important to get treated for this condition.    What are the causes?  Cellulitis is caused by bacteria. The bacteria enter through a break in the skin, such as a cut, burn, insect bite, open sore, or crack.    What increases the risk?    This condition is more likely to occur in people who:  •Have a weak body defense system (immune system).  •Have open wounds on the skin, such as cuts, burns, bites, and scrapes. Bacteria can enter the body through these open wounds.  •Are older than 60 years of age.  •Have diabetes.  •Have a type of long-lasting (chronic) liver disease (cirrhosis) or kidney disease.  •Are obese.    •Have a skin condition such as:  •Itchy rash (eczema).  •Slow movement of blood in the veins (venous stasis).  •Fluid buildup below the skin (edema).  •Have had radiation therapy.  •Use IV drugs.    What are the signs or symptoms?    Symptoms of this condition include:  •Redness, streaking, or spotting on the skin.  •Swollen area of the skin.  •Tenderness or pain when an area of the skin is touched.  •Warm skin.  •A fever.  •Chills.  •Blisters.    How is this diagnosed?    This condition is diagnosed based on a medical history and physical exam. You may also have tests, including:  •Blood tests.  •Imaging tests.    How is this treated?    Treatment for this condition may include:  •Medicines, such as antibiotic medicines or medicines to treat allergies (antihistamines).  •Supportive care, such as rest and application of cold or warm cloths (compresses) to the skin.  •Hospital care, if the condition is severe.    The infection usually starts to get better within 1–2 days of treatment.    Follow these instructions at home:     Medicines   •Take over-the-counter and prescription medicines only as told by your health care provider.  •If you were prescribed an antibiotic medicine, take it as told by your health care provider. Do not stop taking the antibiotic even if you start to feel better.    General instructions   •Drink enough fluid to keep your urine pale yellow.  • Do not touch or rub the infected area.  •Raise (elevate) the infected area above the level of your heart while you are sitting or lying down.  •Apply warm or cold compresses to the affected area as told by your health care provider.  •Keep all follow-up visits as told by your health care provider. This is important. These visits let your health care provider make sure a more serious infection is not developing.    Contact a health care provider if:  •You have a fever.  •Your symptoms do not begin to improve within 1–2 days of starting treatment.  •Your bone or joint underneath the infected area becomes painful after the skin has healed.  •Your infection returns in the same area or another area.  •You notice a swollen bump in the infected area.  •You develop new symptoms.  •You have a general ill feeling (malaise) with muscle aches and pains.    Get help right away if:  •Your symptoms get worse.  •You feel very sleepy.  •You develop vomiting or diarrhea that persists.  •You notice red streaks coming from the infected area.  •Your red area gets larger or turns dark in color.    These symptoms may represent a serious problem that is an emergency. Do not wait to see if the symptoms will go away. Get medical help right away. Call your local emergency services (911 in the [place] Do not drive yourself to the hospital.     Summary  •Cellulitis is a skin infection. This condition occurs most often in the arms and lower legs.  •Treatment for this condition may include medicines, such as antibiotic medicines or antihistamines.  •Take over-the-counter and prescription medicines only as told by your health care provider. If you were prescribed an antibiotic medicine, do not stop taking the antibiotic even if you start to feel better.  •Contact a health care provider if your symptoms do not begin to improve within 1–2 days of starting treatment or your symptoms get worse.  •Keep all follow-up visits as told by your health care provider. This is important. These visits let your health care provider make sure that a more serious infection is not developing.    This information is not intended to replace advice given to you by your health care provider. Make sure you discuss any questions you have with your health care provider.    Document Revised: 12/28/2020 Document Reviewed: 05/09/2019    [employer] Patient Education © 2022 [employer]

## 2024-07-06 NOTE — PHYSICAL THERAPY INITIAL EVALUATION ADULT - DISCHARGE PLANNER MADE AWARE
Ana María Nunez was seen and treated in our emergency department on 7/6/2024.  She may return to work on 07/09/2024.       If you have any questions or concerns, please don't hesitate to call.      Rehana Walls, APRN-CNP
yes

## 2025-05-11 ENCOUNTER — INPATIENT (INPATIENT)
Facility: HOSPITAL | Age: 75
LOS: 3 days | Discharge: HOME | End: 2025-05-15
Attending: HOSPITALIST | Admitting: HOSPITALIST
Payer: MEDICARE

## 2025-05-11 VITALS
OXYGEN SATURATION: 99 % | DIASTOLIC BLOOD PRESSURE: 92 MMHG | RESPIRATION RATE: 15 BRPM | HEART RATE: 104 BPM | SYSTOLIC BLOOD PRESSURE: 148 MMHG | TEMPERATURE: 98 F | WEIGHT: 240.08 LBS

## 2025-05-11 DIAGNOSIS — Z95.1 PRESENCE OF AORTOCORONARY BYPASS GRAFT: Chronic | ICD-10-CM

## 2025-05-11 DIAGNOSIS — Z91.199 PATIENT'S NONCOMPLIANCE WITH OTHER MEDICAL TREATMENT AND REGIMEN DUE TO UNSPECIFIED REASON: ICD-10-CM

## 2025-05-11 DIAGNOSIS — I10 ESSENTIAL (PRIMARY) HYPERTENSION: ICD-10-CM

## 2025-05-11 DIAGNOSIS — R07.9 CHEST PAIN, UNSPECIFIED: ICD-10-CM

## 2025-05-11 DIAGNOSIS — Z95.4 PRESENCE OF OTHER HEART-VALVE REPLACEMENT: Chronic | ICD-10-CM

## 2025-05-11 DIAGNOSIS — Z79.899 OTHER LONG TERM (CURRENT) DRUG THERAPY: ICD-10-CM

## 2025-05-11 DIAGNOSIS — Z29.9 ENCOUNTER FOR PROPHYLACTIC MEASURES, UNSPECIFIED: ICD-10-CM

## 2025-05-11 DIAGNOSIS — I20.0 UNSTABLE ANGINA: ICD-10-CM

## 2025-05-11 DIAGNOSIS — E11.65 TYPE 2 DIABETES MELLITUS WITH HYPERGLYCEMIA: ICD-10-CM

## 2025-05-11 DIAGNOSIS — E78.5 HYPERLIPIDEMIA, UNSPECIFIED: ICD-10-CM

## 2025-05-11 LAB
ADD ON TEST-SPECIMEN IN LAB: SIGNIFICANT CHANGE UP
ALBUMIN SERPL ELPH-MCNC: 3.7 G/DL — SIGNIFICANT CHANGE UP (ref 3.3–5)
ALP SERPL-CCNC: 96 U/L — SIGNIFICANT CHANGE UP (ref 40–120)
ALT FLD-CCNC: 23 U/L — SIGNIFICANT CHANGE UP (ref 4–41)
ANION GAP SERPL CALC-SCNC: 14 MMOL/L — SIGNIFICANT CHANGE UP (ref 7–14)
ANION GAP SERPL CALC-SCNC: 15 MMOL/L — HIGH (ref 7–14)
APTT BLD: 28.1 SEC — SIGNIFICANT CHANGE UP (ref 26.1–36.8)
AST SERPL-CCNC: 29 U/L — SIGNIFICANT CHANGE UP (ref 4–40)
BASOPHILS # BLD AUTO: 0.08 K/UL — SIGNIFICANT CHANGE UP (ref 0–0.2)
BASOPHILS NFR BLD AUTO: 0.8 % — SIGNIFICANT CHANGE UP (ref 0–2)
BILIRUB SERPL-MCNC: 0.6 MG/DL — SIGNIFICANT CHANGE UP (ref 0.2–1.2)
BUN SERPL-MCNC: 12 MG/DL — SIGNIFICANT CHANGE UP (ref 7–23)
BUN SERPL-MCNC: 12 MG/DL — SIGNIFICANT CHANGE UP (ref 7–23)
CALCIUM SERPL-MCNC: 8.7 MG/DL — SIGNIFICANT CHANGE UP (ref 8.4–10.5)
CALCIUM SERPL-MCNC: 8.8 MG/DL — SIGNIFICANT CHANGE UP (ref 8.4–10.5)
CHLORIDE SERPL-SCNC: 100 MMOL/L — SIGNIFICANT CHANGE UP (ref 98–107)
CHLORIDE SERPL-SCNC: 101 MMOL/L — SIGNIFICANT CHANGE UP (ref 98–107)
CK MB BLD-MCNC: 5.3 % — HIGH (ref 0–2.5)
CK MB CFR SERPL CALC: 2 NG/ML — SIGNIFICANT CHANGE UP
CK SERPL-CCNC: 38 U/L — SIGNIFICANT CHANGE UP (ref 30–200)
CO2 SERPL-SCNC: 17 MMOL/L — LOW (ref 22–31)
CO2 SERPL-SCNC: 18 MMOL/L — LOW (ref 22–31)
CREAT SERPL-MCNC: 0.75 MG/DL — SIGNIFICANT CHANGE UP (ref 0.5–1.3)
CREAT SERPL-MCNC: 0.77 MG/DL — SIGNIFICANT CHANGE UP (ref 0.5–1.3)
EGFR: 95 ML/MIN/1.73M2 — SIGNIFICANT CHANGE UP
EGFR: 95 ML/MIN/1.73M2 — SIGNIFICANT CHANGE UP
EGFR: 96 ML/MIN/1.73M2 — SIGNIFICANT CHANGE UP
EGFR: 96 ML/MIN/1.73M2 — SIGNIFICANT CHANGE UP
EOSINOPHIL # BLD AUTO: 0.21 K/UL — SIGNIFICANT CHANGE UP (ref 0–0.5)
EOSINOPHIL NFR BLD AUTO: 2.1 % — SIGNIFICANT CHANGE UP (ref 0–6)
GLUCOSE SERPL-MCNC: 302 MG/DL — HIGH (ref 70–99)
GLUCOSE SERPL-MCNC: 312 MG/DL — HIGH (ref 70–99)
HCT VFR BLD CALC: 47.7 % — SIGNIFICANT CHANGE UP (ref 39–50)
HGB BLD-MCNC: 16.9 G/DL — SIGNIFICANT CHANGE UP (ref 13–17)
IANC: 6.35 K/UL — SIGNIFICANT CHANGE UP (ref 1.8–7.4)
IMM GRANULOCYTES NFR BLD AUTO: 0.3 % — SIGNIFICANT CHANGE UP (ref 0–0.9)
INR BLD: 1.1 RATIO — SIGNIFICANT CHANGE UP (ref 0.85–1.16)
LYMPHOCYTES # BLD AUTO: 2.31 K/UL — SIGNIFICANT CHANGE UP (ref 1–3.3)
LYMPHOCYTES # BLD AUTO: 23.6 % — SIGNIFICANT CHANGE UP (ref 13–44)
MCHC RBC-ENTMCNC: 32.3 PG — SIGNIFICANT CHANGE UP (ref 27–34)
MCHC RBC-ENTMCNC: 35.4 G/DL — SIGNIFICANT CHANGE UP (ref 32–36)
MCV RBC AUTO: 91 FL — SIGNIFICANT CHANGE UP (ref 80–100)
MONOCYTES # BLD AUTO: 0.82 K/UL — SIGNIFICANT CHANGE UP (ref 0–0.9)
MONOCYTES NFR BLD AUTO: 8.4 % — SIGNIFICANT CHANGE UP (ref 2–14)
NEUTROPHILS # BLD AUTO: 6.35 K/UL — SIGNIFICANT CHANGE UP (ref 1.8–7.4)
NEUTROPHILS NFR BLD AUTO: 64.8 % — SIGNIFICANT CHANGE UP (ref 43–77)
NRBC # BLD AUTO: 0 K/UL — SIGNIFICANT CHANGE UP (ref 0–0)
NRBC # FLD: 0 K/UL — SIGNIFICANT CHANGE UP (ref 0–0)
NRBC BLD AUTO-RTO: 0 /100 WBCS — SIGNIFICANT CHANGE UP (ref 0–0)
PLATELET # BLD AUTO: 297 K/UL — SIGNIFICANT CHANGE UP (ref 150–400)
POTASSIUM SERPL-MCNC: 4.2 MMOL/L — SIGNIFICANT CHANGE UP (ref 3.5–5.3)
POTASSIUM SERPL-MCNC: 5.7 MMOL/L — HIGH (ref 3.5–5.3)
POTASSIUM SERPL-SCNC: 4.2 MMOL/L — SIGNIFICANT CHANGE UP (ref 3.5–5.3)
POTASSIUM SERPL-SCNC: 5.7 MMOL/L — HIGH (ref 3.5–5.3)
PROT SERPL-MCNC: 7.3 G/DL — SIGNIFICANT CHANGE UP (ref 6–8.3)
PROTHROM AB SERPL-ACNC: 12.7 SEC — SIGNIFICANT CHANGE UP (ref 9.9–13.4)
RBC # BLD: 5.24 M/UL — SIGNIFICANT CHANGE UP (ref 4.2–5.8)
RBC # FLD: 11.9 % — SIGNIFICANT CHANGE UP (ref 10.3–14.5)
SODIUM SERPL-SCNC: 132 MMOL/L — LOW (ref 135–145)
SODIUM SERPL-SCNC: 133 MMOL/L — LOW (ref 135–145)
TROPONIN T, HIGH SENSITIVITY RESULT: 12 NG/L — SIGNIFICANT CHANGE UP
TROPONIN T, HIGH SENSITIVITY RESULT: 13 NG/L — SIGNIFICANT CHANGE UP
TROPONIN T, HIGH SENSITIVITY RESULT: 14 NG/L — SIGNIFICANT CHANGE UP
WBC # BLD: 9.8 K/UL — SIGNIFICANT CHANGE UP (ref 3.8–10.5)
WBC # FLD AUTO: 9.8 K/UL — SIGNIFICANT CHANGE UP (ref 3.8–10.5)

## 2025-05-11 PROCEDURE — 99223 1ST HOSP IP/OBS HIGH 75: CPT

## 2025-05-11 PROCEDURE — 99285 EMERGENCY DEPT VISIT HI MDM: CPT

## 2025-05-11 PROCEDURE — 71046 X-RAY EXAM CHEST 2 VIEWS: CPT | Mod: 26

## 2025-05-11 PROCEDURE — 99222 1ST HOSP IP/OBS MODERATE 55: CPT | Mod: GC

## 2025-05-11 PROCEDURE — 93010 ELECTROCARDIOGRAM REPORT: CPT

## 2025-05-11 RX ORDER — INSULIN LISPRO 100 U/ML
INJECTION, SOLUTION INTRAVENOUS; SUBCUTANEOUS
Refills: 0 | Status: DISCONTINUED | OUTPATIENT
Start: 2025-05-11 | End: 2025-05-15

## 2025-05-11 RX ORDER — SODIUM CHLORIDE 9 G/1000ML
1000 INJECTION, SOLUTION INTRAVENOUS
Refills: 0 | Status: DISCONTINUED | OUTPATIENT
Start: 2025-05-11 | End: 2025-05-15

## 2025-05-11 RX ORDER — DEXTROSE 50 % IN WATER 50 %
12.5 SYRINGE (ML) INTRAVENOUS ONCE
Refills: 0 | Status: DISCONTINUED | OUTPATIENT
Start: 2025-05-11 | End: 2025-05-15

## 2025-05-11 RX ORDER — ATORVASTATIN CALCIUM 80 MG/1
40 TABLET, FILM COATED ORAL AT BEDTIME
Refills: 0 | Status: DISCONTINUED | OUTPATIENT
Start: 2025-05-11 | End: 2025-05-15

## 2025-05-11 RX ORDER — ASPIRIN 325 MG
81 TABLET ORAL DAILY
Refills: 0 | Status: DISCONTINUED | OUTPATIENT
Start: 2025-05-12 | End: 2025-05-14

## 2025-05-11 RX ORDER — METOPROLOL SUCCINATE 50 MG/1
25 TABLET, EXTENDED RELEASE ORAL
Refills: 0 | Status: DISCONTINUED | OUTPATIENT
Start: 2025-05-11 | End: 2025-05-15

## 2025-05-11 RX ORDER — DEXTROSE 50 % IN WATER 50 %
25 SYRINGE (ML) INTRAVENOUS ONCE
Refills: 0 | Status: DISCONTINUED | OUTPATIENT
Start: 2025-05-11 | End: 2025-05-15

## 2025-05-11 RX ORDER — DEXTROSE 50 % IN WATER 50 %
15 SYRINGE (ML) INTRAVENOUS ONCE
Refills: 0 | Status: DISCONTINUED | OUTPATIENT
Start: 2025-05-11 | End: 2025-05-15

## 2025-05-11 RX ORDER — GLUCAGON 3 MG/1
1 POWDER NASAL ONCE
Refills: 0 | Status: DISCONTINUED | OUTPATIENT
Start: 2025-05-11 | End: 2025-05-15

## 2025-05-11 RX ORDER — MELATONIN 5 MG
3 TABLET ORAL AT BEDTIME
Refills: 0 | Status: DISCONTINUED | OUTPATIENT
Start: 2025-05-11 | End: 2025-05-15

## 2025-05-11 RX ORDER — LISINOPRIL 5 MG/1
5 TABLET ORAL DAILY
Refills: 0 | Status: DISCONTINUED | OUTPATIENT
Start: 2025-05-11 | End: 2025-05-15

## 2025-05-11 RX ORDER — NITROGLYCERIN 20 MG/G
0.4 OINTMENT TOPICAL ONCE
Refills: 0 | Status: COMPLETED | OUTPATIENT
Start: 2025-05-11 | End: 2025-05-11

## 2025-05-11 RX ORDER — ONDANSETRON HCL/PF 4 MG/2 ML
4 VIAL (ML) INJECTION EVERY 8 HOURS
Refills: 0 | Status: DISCONTINUED | OUTPATIENT
Start: 2025-05-11 | End: 2025-05-15

## 2025-05-11 RX ORDER — ACETAMINOPHEN 500 MG/5ML
1000 LIQUID (ML) ORAL ONCE
Refills: 0 | Status: COMPLETED | OUTPATIENT
Start: 2025-05-11 | End: 2025-05-11

## 2025-05-11 RX ORDER — ACETAMINOPHEN 500 MG/5ML
650 LIQUID (ML) ORAL EVERY 6 HOURS
Refills: 0 | Status: DISCONTINUED | OUTPATIENT
Start: 2025-05-11 | End: 2025-05-15

## 2025-05-11 RX ORDER — MAGNESIUM, ALUMINUM HYDROXIDE 200-200 MG
30 TABLET,CHEWABLE ORAL EVERY 4 HOURS
Refills: 0 | Status: DISCONTINUED | OUTPATIENT
Start: 2025-05-11 | End: 2025-05-15

## 2025-05-11 RX ORDER — INSULIN LISPRO 100 U/ML
INJECTION, SOLUTION INTRAVENOUS; SUBCUTANEOUS AT BEDTIME
Refills: 0 | Status: DISCONTINUED | OUTPATIENT
Start: 2025-05-11 | End: 2025-05-15

## 2025-05-11 RX ORDER — ASPIRIN 325 MG
324 TABLET ORAL ONCE
Refills: 0 | Status: COMPLETED | OUTPATIENT
Start: 2025-05-11 | End: 2025-05-11

## 2025-05-11 RX ORDER — HEPARIN SODIUM 1000 [USP'U]/ML
5000 INJECTION INTRAVENOUS; SUBCUTANEOUS EVERY 8 HOURS
Refills: 0 | Status: DISCONTINUED | OUTPATIENT
Start: 2025-05-11 | End: 2025-05-15

## 2025-05-11 RX ADMIN — Medication 3 MILLIGRAM(S): at 21:22

## 2025-05-11 RX ADMIN — METOPROLOL SUCCINATE 25 MILLIGRAM(S): 50 TABLET, EXTENDED RELEASE ORAL at 11:40

## 2025-05-11 RX ADMIN — METOPROLOL SUCCINATE 25 MILLIGRAM(S): 50 TABLET, EXTENDED RELEASE ORAL at 21:22

## 2025-05-11 RX ADMIN — Medication 650 MILLIGRAM(S): at 20:30

## 2025-05-11 RX ADMIN — Medication 400 MILLIGRAM(S): at 06:04

## 2025-05-11 RX ADMIN — Medication 650 MILLIGRAM(S): at 22:30

## 2025-05-11 RX ADMIN — INSULIN LISPRO 3: 100 INJECTION, SOLUTION INTRAVENOUS; SUBCUTANEOUS at 12:32

## 2025-05-11 RX ADMIN — INSULIN LISPRO 2: 100 INJECTION, SOLUTION INTRAVENOUS; SUBCUTANEOUS at 17:50

## 2025-05-11 RX ADMIN — NITROGLYCERIN 0.4 MILLIGRAM(S): 20 OINTMENT TOPICAL at 11:41

## 2025-05-11 RX ADMIN — Medication 324 MILLIGRAM(S): at 04:49

## 2025-05-11 RX ADMIN — HEPARIN SODIUM 5000 UNIT(S): 1000 INJECTION INTRAVENOUS; SUBCUTANEOUS at 21:22

## 2025-05-11 RX ADMIN — Medication 650 MILLIGRAM(S): at 19:45

## 2025-05-11 RX ADMIN — ATORVASTATIN CALCIUM 40 MILLIGRAM(S): 80 TABLET, FILM COATED ORAL at 21:21

## 2025-05-11 RX ADMIN — HEPARIN SODIUM 5000 UNIT(S): 1000 INJECTION INTRAVENOUS; SUBCUTANEOUS at 13:46

## 2025-05-11 RX ADMIN — LISINOPRIL 5 MILLIGRAM(S): 5 TABLET ORAL at 11:40

## 2025-05-11 NOTE — PATIENT PROFILE ADULT - ABILITY TO HEAR (WITH HEARING AID OR HEARING APPLIANCE IF NORMALLY USED):
Adequate: hears normal conversation without difficulty
Jordan Espinalh  Pediatrics  [facility] [place] [address]  Phone: [phone]  Fax: [phone]  Follow Up Time: 1-3 days

## 2025-05-11 NOTE — H&P ADULT - NSHPREVIEWOFSYSTEMS_GEN_ALL_CORE
CONSTITUTIONAL: No fever, weight loss  EYES: No eye pain, visual disturbances, or discharge  ENMT:  No difficulty hearing, tinnitus, vertigo; No sinus or throat pain  RESPIRATORY: No SOB. No cough, wheezing, chills or hemoptysis  CARDIOVASCULAR: No chest pain, palpitations, dizziness, or leg swelling  GASTROINTESTINAL: No abdominal or epigastric pain. No nausea, vomiting, or hematemesis; No diarrhea or constipation. No melena or hematochezia.  GENITOURINARY: No dysuria, frequency, hematuria, or incontinence  NEUROLOGICAL: No headaches, memory loss, loss of strength, numbness, or tremors  SKIN: No itching, burning, rashes, or lesions   MUSCULOSKELETAL: No joint pain or swelling; No muscle, back pain  PSYCHIATRIC: No AH/VH

## 2025-05-11 NOTE — H&P ADULT - ASSESSMENT
74M PMHx DM, HTN, CAD s/p stent (>1 year ago) and CABG (>5 years ago), BPH, HLD, who presents with worsening unstable angina x3-4 days.

## 2025-05-11 NOTE — H&P ADULT - HISTORY OF PRESENT ILLNESS
Mr. Batres is a 74 year old man with PMHx DM, HTN, CAD s/p stent (>1 year ago) and CABG (>5 years ago), BPH, HLD, who presents with unstable angina. Patient states that over the past 3-4 days he has been having progressively more chest pain. Described as across the chest, as a "pressure", and radiation down the ribs. Patient states that over the past 2 nights however, he has been having even more chest pain, especially as he lays down. Because he has not been able to sleep, he came to ED last night. Of note, patient states that he has been working with his PCP to get himself off all his home medications.     In the ED, patient presented tachycardic (), hypertensive (161/91), with labs significant for: WBC 9.8, Hb 16.9, Na 133, K 4.2, BUN/Cr 12/0.75, Glu 302. Troponin 14 (from 12). A1c 10%. CXR clear. Received acetaminophen 1g IV, aspirin 324mg, Per ED, EKG unchanged from prior. Currently without active chest pain.

## 2025-05-11 NOTE — CONSULT NOTE ADULT - ASSESSMENT
Mr. Batres is a 74 year old man with PMHx DM, HTN, CAD s/p stent (>1 year ago) and CABG and AVR forr biscuspid aortic valve (>5 years ago), BPH, HLD, who presents with CP.    CP starting 3-4 days ago, coming and going though progressively last longer, no assoc symptoms, not exertional, not pleuritic or improve with sitting up, reproducible, not improving with nitroglycerin, currently active at rest 5/10. Trop negative 12-->14. In summary, atypical CP in high risk patient without troponin elevation. Reproducibitily of pain along with no effect of exertion lowers pretest probability of UA. However, strange that pain is progressively worsening and no clear explanation for costochodritis. EKG baseline, repeat today unchanged. Please repeat troponin, obtain TTE today or tomorrow. If troponin rules in, would load Ticagrelor and start heparin bolus/gtt, otherwise wait for TTE and consider ischemic evaluation prior to discharge. Patient states he stopped all of his medications on his own 1 year ago    Recommendations:  - repeat Troponin, load ticagrelor and start hep bolus/gtt if trop elevated, otherwise hold off  - cw ASA 81 mg qD  - TTE  - start Lipitor 80 mg qD  - start Coreg 6.25 mg BID for HTN and cardioprotection given hx ACS  - Telemetry   Mr. Batres is a 74 year old man with PMHx DM, HTN, CAD s/p stent (>1 year ago) and CABG and AVR forr biscuspid aortic valve (>5 years ago), BPH, HLD, who presents with CP.    CP starting 3-4 days ago, coming and going though progressively last longer, no assoc symptoms, not exertional, not pleuritic or improve with sitting up, reproducible, not improving with nitroglycerin, currently active at rest 5/10. Trop negative 12-->14. In summary, atypical CP in high risk patient without troponin elevation. Reproducibitily of pain along with no effect of exertion lowers pretest probability of UA. However, strange that pain is progressively worsening and no clear explanation for costochodritis. EKG baseline, repeat today unchanged. Please repeat troponin, obtain TTE today or tomorrow. If troponin rules in, would load Ticagrelor and start heparin bolus/gtt, otherwise wait for TTE and consider ischemic evaluation prior to discharge. Patient states he stopped all of his medications on his own 1 year ago    Recommendations:  - repeat Troponin, load ticagrelor and start hep bolus/gtt if trop elevated, otherwise hold off  - cw ASA 81 mg qD  - TTE  - start Lipitor 80 mg qD  - Telemetry

## 2025-05-11 NOTE — H&P ADULT - TIME BILLING
(including, but not limited to)  - preparing to see the patient (eg, review of tests)   - obtaining and/or reviewing separately obtained history  - performing a medically appropriate examination and/or evaluation   - counseling and educating the patient/family/caregiver    - ordering medications, tests, or procedures   - referring and communicating with other health care professionals (when not separately reported)   - documenting clinical information in the electronic or other health record   - independently interpreting results (not separately reported) and communicating results to the patient/family/caregiver   - care coordination (not separately reported)

## 2025-05-11 NOTE — ED PROVIDER NOTE - ATTENDING CONTRIBUTION TO CARE
75 y/o M with h/o HTN, BPH, hyperlipidemia, DM, CAD s/p MI, CABG x2 ([id number]) and stent, AVR not on any medications p/w chest pain.  Pt endorses 4 days of intermittent anterior chest pain, becoming constant over the past 1.5 days.  Associated with SOB, worse with lying down but not worse with exertion.  No provoking or palliating factors.  No fever, chills, cough, uri sxs, leg pain or swelling, abd pain, n/v.  Pt reports that he was "weaned off" all his medications about 2 years ago by his PMD.  Has not seen a cardiologist or had any cardiac testing in a few years.    Pt lying in stretcher, awake and alert, nontoxic.  AF/VSS.  Lungs cta bl.  Cards nl S1/S2, RRR, no MRG.  Abd soft ntnd.  Trace bilateral pedal edema, no calf swelling or tenderness.    High risk pt with chest pain concerning for cardiac etiology.  Also unclear why pt is not taking medications (suspect uncontrolled HTN and DM due to med nonadherence) including antiplatelets.  Will obtain ekg, labs, cxr, asa, admit for cards evaluation and medication management.

## 2025-05-11 NOTE — CONSULT NOTE ADULT - SUBJECTIVE AND OBJECTIVE BOX
Cardiology Consult Note   [Please check [email or web address] password: [email or web address]" for cardiology service schedule and contact information]    HPI:  Mr. Batres is a 74 year old man with PMHx DM, HTN, CAD s/p stent (>1 year ago) and CABG and AVR forr biscuspid aortic valve (>5 years ago), BPH, HLD, who presents with unstable angina. Patient states that over the past 3-4 days he has been having progressively more chest pain. Described as across the chest, as a "pressure", and radiation down the ribs. Patient states that over the past 2 nights however, he has been having even more chest pain, especially as he lays down. Because he has not been able to sleep, he came to ED last night. Of note, patient states that he has been working with his PCP to get himself off all his home medications.     In the ED, patient presented tachycardic (), hypertensive (161/91), with labs significant for: WBC 9.8, Hb 16.9, Na 133, K 4.2, BUN/Cr 12/0.75, Glu 302. Troponin 14 (from 12). A1c 10%. CXR clear. Received acetaminophen 1g IV, aspirin 324mg, Per ED, EKG unchanged from prior. Currently without active chest pain.   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 650 milliGRAM(s) Oral every 6 hours PRN  melatonin 3 milliGRAM(s) Oral at bedtime PRN  ondansetron Injectable 4 milliGRAM(s) IV Push every 8 hours PRN    aluminum hydroxide/magnesium hydroxide/simethicone Suspension 30 milliLiter(s) Oral every 4 hours PRN    atorvastatin 40 milliGRAM(s) Oral at bedtime  dextrose 50% Injectable 25 Gram(s) IV Push once  dextrose 50% Injectable 12.5 Gram(s) IV Push once  dextrose 50% Injectable 25 Gram(s) IV Push once  dextrose Oral Gel 15 Gram(s) Oral once PRN  glucagon  Injectable 1 milliGRAM(s) IntraMuscular once  insulin lispro (ADMELOG) corrective regimen sliding scale   SubCutaneous three times a day before meals  insulin lispro (ADMELOG) corrective regimen sliding scale   SubCutaneous at bedtime    dextrose 5%. 1000 milliLiter(s) IV Continuous <Continuous>  dextrose 5%. 1000 milliLiter(s) IV Continuous <Continuous>        -------------------------------------------------------------------------------------------  PHYSICAL EXAM:  T(C): 36.9 (05-11-25 @ 10:54), Max: 36.9 (05-11-25 @ 10:54)  HR: 73 (05-11-25 @ 10:54) (65 - 104)  BP: 150/95 (05-11-25 @ 10:54) (148/92 - 161/91)  RR: 18 (05-11-25 @ 10:54) (15 - 18)  SpO2: 99% (05-11-25 @ 10:54) (98% - 99%)  Wt(kg): --  I&O's Summary      GENERAL: NAD  HEAD: Atraumatic, Normocephalic.  ENT: Moist mucous membranes.  NECK: Supple, No JVD.  CHEST/LUNG: Clear to auscultation bilaterally; No rales, rhonchi, wheezing, or rubs. Unlabored respirations.  HEART: Regular rate and rhythm; No murmurs, rubs, or gallops.  ABDOMEN: Bowel sounds present; Soft, Nontender, Nondistended.   EXTREMITIES:  2+ Peripheral Pulses, brisk capillary refill. No clubbing, cyanosis, or edema.    -------------------------------------------------------------------------------------------  LABS:                          16.9   9.80  )-----------( 297      ( 11 May 2025 04:53 )             47.7     05-11    133[L]  |  101  |  12  ----------------------------<  302[H]  4.2   |  18[L]  |  0.75    Ca    8.7      11 May 2025 06:29    TPro  7.3  /  Alb  3.7  /  TBili  0.6  /  DBili  x   /  AST  29  /  ALT  23  /  AlkPhos  96  05-11    PT/INR - ( 11 May 2025 04:53 )   PT: 12.7 sec;   INR: 1.10 ratio         PTT - ( 11 May 2025 04:53 )  PTT:28.1 sec  CARDIAC MARKERS ( 11 May 2025 06:29 )  14 ng/L / x     / x     / x     / x     / x      CARDIAC MARKERS ( 11 May 2025 04:53 )  12 ng/L / x     / x     / x     / x     / x              acetaminophen     Tablet .. 650 milliGRAM(s) Oral every 6 hours PRN  melatonin 3 milliGRAM(s) Oral at bedtime PRN  ondansetron Injectable 4 milliGRAM(s) IV Push every 8 hours PRN      -------------------------------------------------------------------------------------------  Cardiovascular Diagnostic Testing:    ECG:     Echo:     Stress Testing:    Cath:    -------------------------------------------------------------------------------------------                 Cardiology Consult Note   [Please check [email or web address] password: [email or web address]" for cardiology service schedule and contact information]    HPI:  Mr. Batres is a 74 year old man with PMHx DM, HTN, CAD s/p stent (>1 year ago) and CABG and AVR forr biscuspid aortic valve (>5 years ago), BPH, HLD, who presents with CP. Patient states that over the past 3-4 days he has been having progressively more chest pain. Described as across the chest, as a "pressure", and radiation down the ribs. Patient states that over the past 2 nights however, he has been having even more chest pain, especially as he lays down. Because he has not been able to sleep, he came to ED last night. Of note, patient states that he has been working with his PCP to get himself off all his home medications.     In the ED, patient presented tachycardic (), hypertensive (161/91), with labs significant for: WBC 9.8, Hb 16.9, Na 133, K 4.2, BUN/Cr 12/0.75, Glu 302. Troponin 14 (from 12). A1c 10%. CXR clear. Received acetaminophen 1g IV, aspirin 324mg, Per ED, EKG unchanged from prior. Currently without active chest pain.   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 650 milliGRAM(s) Oral every 6 hours PRN  melatonin 3 milliGRAM(s) Oral at bedtime PRN  ondansetron Injectable 4 milliGRAM(s) IV Push every 8 hours PRN    aluminum hydroxide/magnesium hydroxide/simethicone Suspension 30 milliLiter(s) Oral every 4 hours PRN    atorvastatin 40 milliGRAM(s) Oral at bedtime  dextrose 50% Injectable 25 Gram(s) IV Push once  dextrose 50% Injectable 12.5 Gram(s) IV Push once  dextrose 50% Injectable 25 Gram(s) IV Push once  dextrose Oral Gel 15 Gram(s) Oral once PRN  glucagon  Injectable 1 milliGRAM(s) IntraMuscular once  insulin lispro (ADMELOG) corrective regimen sliding scale   SubCutaneous three times a day before meals  insulin lispro (ADMELOG) corrective regimen sliding scale   SubCutaneous at bedtime    dextrose 5%. 1000 milliLiter(s) IV Continuous <Continuous>  dextrose 5%. 1000 milliLiter(s) IV Continuous <Continuous>        -------------------------------------------------------------------------------------------  PHYSICAL EXAM:  T(C): 36.9 (05-11-25 @ 10:54), Max: 36.9 (05-11-25 @ 10:54)  HR: 73 (05-11-25 @ 10:54) (65 - 104)  BP: 150/95 (05-11-25 @ 10:54) (148/92 - 161/91)  RR: 18 (05-11-25 @ 10:54) (15 - 18)  SpO2: 99% (05-11-25 @ 10:54) (98% - 99%)  Wt(kg): --  I&O's Summary      GENERAL: NAD  HEAD: Atraumatic, Normocephalic.  ENT: EOMI, Moist mucous membranes.  NECK: Supple  CHEST/LUNG: Clear to auscultation bilaterally; No rales, rhonchi, wheezing, or rubs. Unlabored respirations; area of CP on L lower chest tender to palpation  HEART: Regular rate and rhythm; No murmurs, rubs, or gallops.  EXTREMITIES: no edema    -------------------------------------------------------------------------------------------  LABS:                          16.9   9.80  )-----------( 297      ( 11 May 2025 04:53 )             47.7     05-11    133[L]  |  101  |  12  ----------------------------<  302[H]  4.2   |  18[L]  |  0.75    Ca    8.7      11 May 2025 06:29    TPro  7.3  /  Alb  3.7  /  TBili  0.6  /  DBili  x   /  AST  29  /  ALT  23  /  AlkPhos  96  05-11    PT/INR - ( 11 May 2025 04:53 )   PT: 12.7 sec;   INR: 1.10 ratio         PTT - ( 11 May 2025 04:53 )  PTT:28.1 sec  CARDIAC MARKERS ( 11 May 2025 06:29 )  14 ng/L / x     / x     / x     / x     / x      CARDIAC MARKERS ( 11 May 2025 04:53 )  12 ng/L / x     / x     / x     / x     / x              acetaminophen     Tablet .. 650 milliGRAM(s) Oral every 6 hours PRN  melatonin 3 milliGRAM(s) Oral at bedtime PRN  ondansetron Injectable 4 milliGRAM(s) IV Push every 8 hours PRN      -------------------------------------------------------------------------------------------  Cardiovascular Diagnostic Testing:    ECG:     Echo:     Stress Testing:    Cath:    -------------------------------------------------------------------------------------------

## 2025-05-11 NOTE — ED ADULT NURSE NOTE - OBJECTIVE STATEMENT
Pt received in room 2. A&O4. ambulatory. hx: DM, HTN, cow valve replacement, stents (not on any medications daily). Came into ED with complaints of midsternal chest pain and SOB x 3 days. Reports when taking deep breaths in SOB is worse. Pt appears well, no signs of acute distress noted. sinus rhythm on monitor. denies dizziness, N/V, abd pain, fevers. 20g IV L forearm. labs drawn and sent. Medicated per MD orders. Comfort and safety maintained. Stretcher in lowest position. Call bell within reach.

## 2025-05-11 NOTE — ED ADULT NURSE REASSESSMENT NOTE - NS ED NURSE REASSESS COMMENT FT1
Report received from UBALDO Castro. Pt A&ox4, ambulatory, on room air. Pt with no acute changes at this time. NSR on cardiac monitor. Pt respirations even and unlabored, chest rise and fall equal b/l. Pt denies chest pain, HA, SOB, dizziness, N/V/D, fever/chills. Left 20g patent, no redness or swelling to site. Pt admitted to telemetry. Stretcher in lowest position, call bell within reach, pt safety maintained.

## 2025-05-11 NOTE — CONSULT NOTE ADULT - ATTENDING COMMENTS
The patient was seen and examined with the Cardiology Consultation Teaching Service.   Spouse at bedside    The patient is a 74-year-old man with coronary artery disease, prior CABG and PCI, AVR who presents with progressive chest discomfort for four days.    The patient reports an intermittent pressure that has been occurring across his chest. It initially was somewhat pleuritic. Now it improves with sitting up and with exertion, and it is worse when he is reclining. It is limiting him from sleep. It is different than his symptoms prior to his CABG and PCI.    Currently, the pain is constant  SL nitroglycerin was administered without effect    No dyspnea, orthopnea or PND  No palpitations or syncope    No recent viral illnesses or fever    PMH/PSH:  Coronary artery disease      Coronary artery bypass     Percutaneous coronary intervention  Bicuspid aortic valve with subsequent bioprosthesis  Diabetes  Hypertension  Dyslipidemia  Benign prostatic hyperplasia    Comfortable-appearing man in no acute distress  Alert and oriented  Afebrile  Vital signs stable  JVP is not elevated  Clear lungs  Normal heart sounds  Extremities are warm and perfused  No peripheral edema     Normal blood counts  Normal coagulation  Hyponatremia 133  GFR 96    Hs-troponin 13, 14, 12    ECG demonstrates sinus rhythm with first degree AV delay, left axis, LAFB, LVH with likely secondary ST-T wave changes similar to those noted on ECG in 2019    CXR demonstrates clear lungs    Impression and Recommendations:   74-year-old man with coronary artery disease, prior CABG and PCI, AVR who presents with progressive chest discomfort for four days. Normal troponin. No new ECG changes.    The patient's chest pain does not sound cardiac in nature. It is alleviated by exertion, which would be very atypical of angina. It is highly positional and made worse with reclining, which is more clinically consistent with pericarditis, but the patient has no recent illnesses.     Please continue to monitor on telemetry.   Obtain echocardiography.     I would defer starting treatment for ACS at this time, unless there is a change in the character of his symptoms, troponin or ECG.     Continue aspirin and atorvastatin for his known coronary disease. Continue lisinopril and metoprolol for now, though this may be modified based on his echocardiography findings.     Please send ESR and CRP. If elevated, consider empiric treatment for pericarditis.     Levar Irwin MD FACC FACP  Cardiology  [phone] The patient was seen and examined with the Cardiology Consultation Teaching Service.   Spouse at bedside    The patient is a 74-year-old man with coronary artery disease, prior CABG and PCI, AVR who presents with progressive chest discomfort for four days.    The patient reports an intermittent pressure that has been occurring across his chest. It initially was somewhat pleuritic. Now it improves with sitting up and with exertion, and it is worse when he is reclining. It is limiting him from sleep. It is different than his symptoms prior to his CABG and PCI.    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    Comfortable-appearing man in no acute distress  Alert and oriented  Afebrile  Vital signs stable  JVP is not elevated  Clear lungs  Normal heart sounds  Extremities are warm and perfused  No peripheral edema     Normal blood counts  Normal coagulation  Hyponatremia 133  GFR 96    Hs-troponin 13, 14, 12    ECG demonstrates sinus rhythm with first degree AV delay, left axis, LAFB, LVH with likely secondary ST-T wave changes similar to those noted on ECG in 2019    CXR demonstrates clear lungs    Echocardiography in 2020 demonstrated normal LV systolic function, LVEF 65%, bioprosthesis in the aortic position with normal function, normal LA size    Impression and Recommendations:   74-year-old man with coronary artery disease, prior CABG and PCI, AVR who presents with progressive chest discomfort for four days. Normal troponin. No new ECG changes.    The patient's chest pain does not sound cardiac in nature. It is alleviated by exertion, which would be very atypical of angina. It is highly positional and made worse with reclining, which is more clinically consistent with pericarditis, but the patient has no recent illnesses.     Please continue to monitor on telemetry.   Obtain echocardiography.     I would defer starting treatment for ACS at this time, unless there is a change in the character of his symptoms, troponin or ECG.     Continue aspirin and atorvastatin for his known coronary disease. Continue lisinopril and metoprolol for now, though this may be modified based on his echocardiography findings.     Please send ESR and CRP. If elevated, consider empiric treatment for pericarditis.     Levar Irwin MD FACC FACP  Cardiology  [phone]

## 2025-05-11 NOTE — ED ADULT TRIAGE NOTE - CHIEF COMPLAINT QUOTE
C/O chest pain and SOB. No complaints of  headache, nausea, dizziness, vomiting  fever, chills verbalized. Phx valve replacement

## 2025-05-11 NOTE — ED ADULT NURSE NOTE - NSFALLUNIVINTERV_ED_ALL_ED
Bed/Stretcher in lowest position, wheels locked, appropriate side rails in place/Call bell, personal items and telephone in reach/Instruct patient to call for assistance before getting out of bed/chair/stretcher/Non-slip footwear applied when patient is off stretcher/Pinch to call system/Physically safe environment - no spills, clutter or unnecessary equipment/Purposeful proactive rounding/Room/bathroom lighting operational, light cord in reach

## 2025-05-11 NOTE — H&P ADULT - NSHPPHYSICALEXAM_GEN_ALL_CORE
CONSTITUTIONAL: NAD  EYES:  non-icteric  ENMT: Oral mucosa with moist membranes  NECK: Supple  RESP: No respiratory distress, no use of accessory muscles  CV: RRR  GI: Soft, NT, ND, no rebound, no guarding  MSK: Normal ROM without pain, no gross deformity   SKIN: No rashes or ulcers noted  NEURO: No focal deficits   PSYCH: Appropriate insight/judgment; A+O x 3

## 2025-05-11 NOTE — H&P ADULT - PROBLEM SELECTOR PLAN 2
States he has been "weaned off" with his PCP  Possible underlying medical noncompliance  /91 at admission   A1c 10%  - Initiate appropriate medications, including beta blocker, ACEi, ASA, statin

## 2025-05-11 NOTE — ED PROVIDER NOTE - CLINICAL SUMMARY MEDICAL DECISION MAKING FREE TEXT BOX
73yo M, hx of DM, HTN, CAD s/p stent (not on any medications daily), presenting for evaluation of chest pain. Reports that it is midsternal chest pain with accompanying SOB and has been present for 3 days. Pain is pressure-like and now constant, occurring at rest. Denies fever, nausea, vomiting, abdominal pain. Did not take any medications prior to arrival.    Vitals nonactionable    Well appearing, mentating appropriately, heart rrr, lungs clear, abdomen soft, nontender, no significant peripheral edema    Atypical chest pain, will obtain screening labs and cxr for ACS as potential cause, also considering infectious etiology such as pneumonia, MSK, give asa, reassess

## 2025-05-11 NOTE — ED ADULT TRIAGE NOTE - MODE OF ARRIVAL
Walk in [place] counseling:  Patient advised to apply a pea-sized amount only at bedtime and wait 30 minutes after washing their face before applying.  If too drying, patient may add a non-comedogenic moisturizer.  The most commonly reported side effects including irritation, redness, scaling, dryness, stinging, burning, itching, and increased risk of sunburn.  The patient verbalized understanding of the proper use and possible adverse effects of retinoids.  All of the patient's questions and concerns were addressed.

## 2025-05-11 NOTE — H&P ADULT - PROBLEM SELECTOR PLAN 3
A1c 10%  Not on medications at home  - FSG monitoring  - insulin sliding scale  - hypoglycemic protocol

## 2025-05-12 ENCOUNTER — RESULT REVIEW (OUTPATIENT)
Age: 75
End: 2025-05-12

## 2025-05-12 LAB
ALBUMIN SERPL ELPH-MCNC: 3.5 G/DL — SIGNIFICANT CHANGE UP (ref 3.3–5)
ALP SERPL-CCNC: 89 U/L — SIGNIFICANT CHANGE UP (ref 40–120)
ALT FLD-CCNC: 18 U/L — SIGNIFICANT CHANGE UP (ref 4–41)
ANION GAP SERPL CALC-SCNC: 14 MMOL/L — SIGNIFICANT CHANGE UP (ref 7–14)
AST SERPL-CCNC: 18 U/L — SIGNIFICANT CHANGE UP (ref 4–40)
BASOPHILS # BLD AUTO: 0.05 K/UL — SIGNIFICANT CHANGE UP (ref 0–0.2)
BASOPHILS NFR BLD AUTO: 0.6 % — SIGNIFICANT CHANGE UP (ref 0–2)
BILIRUB SERPL-MCNC: 0.6 MG/DL — SIGNIFICANT CHANGE UP (ref 0.2–1.2)
BUN SERPL-MCNC: 12 MG/DL — SIGNIFICANT CHANGE UP (ref 7–23)
CALCIUM SERPL-MCNC: 8.7 MG/DL — SIGNIFICANT CHANGE UP (ref 8.4–10.5)
CHLORIDE SERPL-SCNC: 99 MMOL/L — SIGNIFICANT CHANGE UP (ref 98–107)
CO2 SERPL-SCNC: 20 MMOL/L — LOW (ref 22–31)
CREAT SERPL-MCNC: 0.77 MG/DL — SIGNIFICANT CHANGE UP (ref 0.5–1.3)
EGFR: 95 ML/MIN/1.73M2 — SIGNIFICANT CHANGE UP
EGFR: 95 ML/MIN/1.73M2 — SIGNIFICANT CHANGE UP
EOSINOPHIL # BLD AUTO: 0.26 K/UL — SIGNIFICANT CHANGE UP (ref 0–0.5)
EOSINOPHIL NFR BLD AUTO: 3.3 % — SIGNIFICANT CHANGE UP (ref 0–6)
GLUCOSE BLDC GLUCOMTR-MCNC: 184 MG/DL — HIGH (ref 70–99)
GLUCOSE BLDC GLUCOMTR-MCNC: 231 MG/DL — HIGH (ref 70–99)
GLUCOSE BLDC GLUCOMTR-MCNC: 231 MG/DL — HIGH (ref 70–99)
GLUCOSE BLDC GLUCOMTR-MCNC: 311 MG/DL — HIGH (ref 70–99)
GLUCOSE SERPL-MCNC: 241 MG/DL — HIGH (ref 70–99)
HCT VFR BLD CALC: 46.9 % — SIGNIFICANT CHANGE UP (ref 39–50)
HGB BLD-MCNC: 16.5 G/DL — SIGNIFICANT CHANGE UP (ref 13–17)
IANC: 4.6 K/UL — SIGNIFICANT CHANGE UP (ref 1.8–7.4)
IMM GRANULOCYTES NFR BLD AUTO: 0.4 % — SIGNIFICANT CHANGE UP (ref 0–0.9)
LYMPHOCYTES # BLD AUTO: 2.32 K/UL — SIGNIFICANT CHANGE UP (ref 1–3.3)
LYMPHOCYTES # BLD AUTO: 29.6 % — SIGNIFICANT CHANGE UP (ref 13–44)
MCHC RBC-ENTMCNC: 32 PG — SIGNIFICANT CHANGE UP (ref 27–34)
MCHC RBC-ENTMCNC: 35.2 G/DL — SIGNIFICANT CHANGE UP (ref 32–36)
MCV RBC AUTO: 91.1 FL — SIGNIFICANT CHANGE UP (ref 80–100)
MONOCYTES # BLD AUTO: 0.58 K/UL — SIGNIFICANT CHANGE UP (ref 0–0.9)
MONOCYTES NFR BLD AUTO: 7.4 % — SIGNIFICANT CHANGE UP (ref 2–14)
NEUTROPHILS # BLD AUTO: 4.6 K/UL — SIGNIFICANT CHANGE UP (ref 1.8–7.4)
NEUTROPHILS NFR BLD AUTO: 58.7 % — SIGNIFICANT CHANGE UP (ref 43–77)
NRBC # BLD AUTO: 0 K/UL — SIGNIFICANT CHANGE UP (ref 0–0)
NRBC # FLD: 0 K/UL — SIGNIFICANT CHANGE UP (ref 0–0)
NRBC BLD AUTO-RTO: 0 /100 WBCS — SIGNIFICANT CHANGE UP (ref 0–0)
PLATELET # BLD AUTO: 280 K/UL — SIGNIFICANT CHANGE UP (ref 150–400)
POTASSIUM SERPL-MCNC: 3.9 MMOL/L — SIGNIFICANT CHANGE UP (ref 3.5–5.3)
POTASSIUM SERPL-SCNC: 3.9 MMOL/L — SIGNIFICANT CHANGE UP (ref 3.5–5.3)
PROT SERPL-MCNC: 6.6 G/DL — SIGNIFICANT CHANGE UP (ref 6–8.3)
RBC # BLD: 5.15 M/UL — SIGNIFICANT CHANGE UP (ref 4.2–5.8)
RBC # FLD: 11.9 % — SIGNIFICANT CHANGE UP (ref 10.3–14.5)
SODIUM SERPL-SCNC: 133 MMOL/L — LOW (ref 135–145)
WBC # BLD: 7.84 K/UL — SIGNIFICANT CHANGE UP (ref 3.8–10.5)
WBC # FLD AUTO: 7.84 K/UL — SIGNIFICANT CHANGE UP (ref 3.8–10.5)

## 2025-05-12 PROCEDURE — 99233 SBSQ HOSP IP/OBS HIGH 50: CPT

## 2025-05-12 PROCEDURE — 93010 ELECTROCARDIOGRAM REPORT: CPT

## 2025-05-12 PROCEDURE — 93306 TTE W/DOPPLER COMPLETE: CPT | Mod: 26

## 2025-05-12 RX ORDER — INSULIN LISPRO 100 U/ML
2 INJECTION, SOLUTION INTRAVENOUS; SUBCUTANEOUS
Refills: 0 | Status: DISCONTINUED | OUTPATIENT
Start: 2025-05-12 | End: 2025-05-13

## 2025-05-12 RX ORDER — INSULIN GLARGINE-YFGN 100 [IU]/ML
10 INJECTION, SOLUTION SUBCUTANEOUS AT BEDTIME
Refills: 0 | Status: DISCONTINUED | OUTPATIENT
Start: 2025-05-12 | End: 2025-05-13

## 2025-05-12 RX ADMIN — INSULIN GLARGINE-YFGN 10 UNIT(S): 100 INJECTION, SOLUTION SUBCUTANEOUS at 22:42

## 2025-05-12 RX ADMIN — INSULIN LISPRO 1: 100 INJECTION, SOLUTION INTRAVENOUS; SUBCUTANEOUS at 17:36

## 2025-05-12 RX ADMIN — LISINOPRIL 5 MILLIGRAM(S): 5 TABLET ORAL at 06:51

## 2025-05-12 RX ADMIN — Medication 81 MILLIGRAM(S): at 11:09

## 2025-05-12 RX ADMIN — INSULIN LISPRO 2: 100 INJECTION, SOLUTION INTRAVENOUS; SUBCUTANEOUS at 08:54

## 2025-05-12 RX ADMIN — METOPROLOL SUCCINATE 25 MILLIGRAM(S): 50 TABLET, EXTENDED RELEASE ORAL at 17:04

## 2025-05-12 RX ADMIN — HEPARIN SODIUM 5000 UNIT(S): 1000 INJECTION INTRAVENOUS; SUBCUTANEOUS at 21:43

## 2025-05-12 RX ADMIN — HEPARIN SODIUM 5000 UNIT(S): 1000 INJECTION INTRAVENOUS; SUBCUTANEOUS at 06:51

## 2025-05-12 RX ADMIN — Medication 3 MILLIGRAM(S): at 21:43

## 2025-05-12 RX ADMIN — INSULIN LISPRO 2: 100 INJECTION, SOLUTION INTRAVENOUS; SUBCUTANEOUS at 12:43

## 2025-05-12 RX ADMIN — Medication 650 MILLIGRAM(S): at 21:44

## 2025-05-12 RX ADMIN — HEPARIN SODIUM 5000 UNIT(S): 1000 INJECTION INTRAVENOUS; SUBCUTANEOUS at 13:43

## 2025-05-12 RX ADMIN — INSULIN LISPRO 2 UNIT(S): 100 INJECTION, SOLUTION INTRAVENOUS; SUBCUTANEOUS at 18:03

## 2025-05-12 RX ADMIN — METOPROLOL SUCCINATE 25 MILLIGRAM(S): 50 TABLET, EXTENDED RELEASE ORAL at 06:51

## 2025-05-12 RX ADMIN — ATORVASTATIN CALCIUM 40 MILLIGRAM(S): 80 TABLET, FILM COATED ORAL at 21:43

## 2025-05-12 NOTE — PROGRESS NOTE ADULT - SUBJECTIVE AND OBJECTIVE BOX
Cardiology Progress Note  ------------------------------------------------------------------------------------------  SUBJECTIVE:   No events overnight. Endorses continued L sided reproducible chest pain. TTE pending.   -------------------------------------------------------------------------------------------  ROS:  CV: chest pain (-), palpitation (-), orthopnea (-), PND (-), edema (-)  PULM: SOB (-), cough (-), wheezing (-), hemoptysis (-).   CONST: fever (-), chills (-) or fatigue (-)  GI: abdominal distension (-), abdominal pain (-) , nausea/vomiting (-), hematemesis, (-), melena (-), hematochezia (-)  : dysuria (-), frequency (-), hematuria (-).   NEURO: numbness (-), weakness (-), dizziness (-)  MSK: myalgia (-), joint pain (-)   SKIN: itching (-), rash (-)  HEENT:  visual changes (-); vertigo or throat pain (-);  neck stiffness (-)   Psych: change in mood (-), anxiety (-), depression (-)     All other review of systems is negative unless indicated above.   -------------------------------------------------------------------------------------------  VS:  T(F): 97.7 (05-12), Max: 98.9 (05-11)  HR: 65 (05-12) (64 - 76)  BP: 146/82 (05-12) (133/72 - 170/87)  RR: 18 (05-12)  SpO2: 97% (05-12)  I&O's Summary    ------------------------------------------------------------------------------------------  PHYSICAL EXAM:  GENERAL: NAD  HEAD: Atraumatic, Normocephalic.  ENT: EOMI, Moist mucous membranes.  NECK: Supple  CHEST/LUNG: Clear to auscultation bilaterally; No rales, rhonchi, wheezing, or rubs. Unlabored respirations; area of CP on L lower chest tender to palpation  HEART: Regular rate and rhythm; No murmurs, rubs, or gallops.  EXTREMITIES: no edema-------------------------------------------------------------------------------------------  LABS:  05-12    133[L]  |  99  |  12  ----------------------------<  241[H]  3.9   |  20[L]  |  0.77    Ca    8.7      12 May 2025 06:30    TPro  6.6  /  Alb  3.5  /  TBili  0.6  /  DBili  x   /  AST  18  /  ALT  18  /  AlkPhos  89  05-12    Creatinine Trend: 0.77<--, 0.75<--, 0.77<--                        16.5   7.84  )-----------( 280      ( 12 May 2025 06:30 )             46.9     PT/INR - ( 11 May 2025 04:53 )   PT: 12.7 sec;   INR: 1.10 ratio         PTT - ( 11 May 2025 04:53 )  PTT:28.1 sec    Lipid Panel: T(F): 97.7 (05-12), Max: 98.9 (05-11)  HR: 65 (05-12) (64 - 76)  BP: 146/82 (05-12) (133/72 - 170/87)  RR: 18 (05-12)  SpO2: 97% (05-12)  Cardiac Enzymes: CARDIAC MARKERS ( 11 May 2025 13:11 )  x     / x     / x     / x     / 2.0 ng/mL          -------------------------------------------------------------------------------------------  Meds:  acetaminophen     Tablet .. 650 milliGRAM(s) Oral every 6 hours PRN  aluminum hydroxide/magnesium hydroxide/simethicone Suspension 30 milliLiter(s) Oral every 4 hours PRN  aspirin enteric coated 81 milliGRAM(s) Oral daily  atorvastatin 40 milliGRAM(s) Oral at bedtime  dextrose 5%. 1000 milliLiter(s) IV Continuous <Continuous>  dextrose 5%. 1000 milliLiter(s) IV Continuous <Continuous>  dextrose 50% Injectable 25 Gram(s) IV Push once  dextrose 50% Injectable 12.5 Gram(s) IV Push once  dextrose 50% Injectable 25 Gram(s) IV Push once  dextrose Oral Gel 15 Gram(s) Oral once PRN  glucagon  Injectable 1 milliGRAM(s) IntraMuscular once  heparin   Injectable 5000 Unit(s) SubCutaneous every 8 hours  insulin lispro (ADMELOG) corrective regimen sliding scale   SubCutaneous three times a day before meals  insulin lispro (ADMELOG) corrective regimen sliding scale   SubCutaneous at bedtime  lisinopril 5 milliGRAM(s) Oral daily  melatonin 3 milliGRAM(s) Oral at bedtime PRN  metoprolol tartrate 25 milliGRAM(s) Oral two times a day  ondansetron Injectable 4 milliGRAM(s) IV Push every 8 hours PRN    -------------------------------------------------------------------------------------------  Cardiovascular Diagnostic Testing:  -------------------------------------------------------------------------------------------  ECG: NSR, 1st degree AVB, LAFB    Echo: pending      CXR:  reviewed  -------------------------------------------------------------------------------------------  Assessment and Plan:   -------------------------------------------------------------------------------------------  Problems Assessed:   #Chest pain  #CAD s/p PCI and CABG  #AS s/p AVR    Plan:   - Chest pain atypical, reproducible and at time positionally related  - trops negative  - Obtain TTE  - Will additionally consider NST IP vs. OP, incumbent upon TTE findings  - Obtain inflammatory markers, if markedly elevated can start empiric treatment for pericarditis      ------------------------------------------------------------------------------------------  Jose Luis Patino MD   of Cardiology  [facility]  [address], [address]  [place] [address]  Phone: [phone]  Fax: [phone]  Please check [email or web address]: [email or web address] for cardiology service schedule and contact information.   ------------------------------------------------------------------------------------------  Billing Statement:   76/56/51/36 minutes spent on total encounter. Necessity of time spent during this encounter on this date of service was due to review of medical information in EMR, co-ordination of hospital and outpatient care, discussion with patient and communication with primary team.   ------------------------------------------------------------------------------------------- Cardiology Progress Note  ------------------------------------------------------------------------------------------  SUBJECTIVE:   No events overnight. Endorses continued L sided reproducible chest pain. TTE pending.   -------------------------------------------------------------------------------------------  ROS:  CV: chest pain (-), palpitation (-), orthopnea (-), PND (-), edema (-)  PULM: SOB (-), cough (-), wheezing (-), hemoptysis (-).   CONST: fever (-), chills (-) or fatigue (-)  GI: abdominal distension (-), abdominal pain (-) , nausea/vomiting (-), hematemesis, (-), melena (-), hematochezia (-)  : dysuria (-), frequency (-), hematuria (-).   NEURO: numbness (-), weakness (-), dizziness (-)  MSK: myalgia (-), joint pain (-)   SKIN: itching (-), rash (-)  HEENT:  visual changes (-); vertigo or throat pain (-);  neck stiffness (-)   Psych: change in mood (-), anxiety (-), depression (-)     All other review of systems is negative unless indicated above.   -------------------------------------------------------------------------------------------  VS:  T(F): 97.7 (05-12), Max: 98.9 (05-11)  HR: 65 (05-12) (64 - 76)  BP: 146/82 (05-12) (133/72 - 170/87)  RR: 18 (05-12)  SpO2: 97% (05-12)  I&O's Summary    ------------------------------------------------------------------------------------------  PHYSICAL EXAM:  GENERAL: NAD  HEAD: Atraumatic, Normocephalic.  ENT: EOMI, Moist mucous membranes.  NECK: Supple  CHEST/LUNG: Clear to auscultation bilaterally; No rales, rhonchi, wheezing, or rubs. Unlabored respirations; area of CP on L lower chest tender to palpation  HEART: Regular rate and rhythm; No murmurs, rubs, or gallops.  EXTREMITIES: no edema-------------------------------------------------------------------------------------------  LABS:  05-12    133[L]  |  99  |  12  ----------------------------<  241[H]  3.9   |  20[L]  |  0.77    Ca    8.7      12 May 2025 06:30    TPro  6.6  /  Alb  3.5  /  TBili  0.6  /  DBili  x   /  AST  18  /  ALT  18  /  AlkPhos  89  05-12    Creatinine Trend: 0.77<--, 0.75<--, 0.77<--                        16.5   7.84  )-----------( 280      ( 12 May 2025 06:30 )             46.9     PT/INR - ( 11 May 2025 04:53 )   PT: 12.7 sec;   INR: 1.10 ratio         PTT - ( 11 May 2025 04:53 )  PTT:28.1 sec    Lipid Panel: T(F): 97.7 (05-12), Max: 98.9 (05-11)  HR: 65 (05-12) (64 - 76)  BP: 146/82 (05-12) (133/72 - 170/87)  RR: 18 (05-12)  SpO2: 97% (05-12)  Cardiac Enzymes: CARDIAC MARKERS ( 11 May 2025 13:11 )  x     / x     / x     / x     / 2.0 ng/mL          -------------------------------------------------------------------------------------------  Meds:  acetaminophen     Tablet .. 650 milliGRAM(s) Oral every 6 hours PRN  aluminum hydroxide/magnesium hydroxide/simethicone Suspension 30 milliLiter(s) Oral every 4 hours PRN  aspirin enteric coated 81 milliGRAM(s) Oral daily  atorvastatin 40 milliGRAM(s) Oral at bedtime  dextrose 5%. 1000 milliLiter(s) IV Continuous <Continuous>  dextrose 5%. 1000 milliLiter(s) IV Continuous <Continuous>  dextrose 50% Injectable 25 Gram(s) IV Push once  dextrose 50% Injectable 12.5 Gram(s) IV Push once  dextrose 50% Injectable 25 Gram(s) IV Push once  dextrose Oral Gel 15 Gram(s) Oral once PRN  glucagon  Injectable 1 milliGRAM(s) IntraMuscular once  heparin   Injectable 5000 Unit(s) SubCutaneous every 8 hours  insulin lispro (ADMELOG) corrective regimen sliding scale   SubCutaneous three times a day before meals  insulin lispro (ADMELOG) corrective regimen sliding scale   SubCutaneous at bedtime  lisinopril 5 milliGRAM(s) Oral daily  melatonin 3 milliGRAM(s) Oral at bedtime PRN  metoprolol tartrate 25 milliGRAM(s) Oral two times a day  ondansetron Injectable 4 milliGRAM(s) IV Push every 8 hours PRN    -------------------------------------------------------------------------------------------  Cardiovascular Diagnostic Testing:  -------------------------------------------------------------------------------------------  ECG: NSR, 1st degree AVB, LAFB    Echo: pending      CXR:  reviewed  -------------------------------------------------------------------------------------------  Assessment and Plan:   -------------------------------------------------------------------------------------------  Problems Assessed:   #Chest pain  #CAD s/p PCI and CABG  #AS s/p AVR    Plan:   - Chest pain atypical, reproducible and at time positionally related  - trops negative  - Obtain TTE  - Will additionally consider NST IP vs. OP, incumbent upon TTE findings  - Obtain inflammatory markers, if markedly elevated can start empiric treatment for pericarditis      ------------------------------------------------------------------------------------------  Jose Luis Patino MD   of Cardiology  [facility]  [address], [address]  [place] [address]  Phone: [phone]  Fax: [phone]  Please check [email or web address]: [email or web address] for cardiology service schedule and contact information.   ------------------------------------------------------------------------------------------  Billing Statement:   51 minutes spent on total encounter. Necessity of time spent during this encounter on this date of service was due to review of medical information in EMR, co-ordination of hospital and outpatient care, discussion with patient and communication with primary team.   -------------------------------------------------------------------------------------------

## 2025-05-12 NOTE — PROGRESS NOTE ADULT - SUBJECTIVE AND OBJECTIVE BOX
Patient is a 74y old  Male who presents with a chief complaint of Chest pain (12 May 2025 11:44)      SUBJECTIVE / OVERNIGHT EVENTS:    MEDICATIONS  (STANDING):  aspirin enteric coated 81 milliGRAM(s) Oral daily  atorvastatin 40 milliGRAM(s) Oral at bedtime  dextrose 5%. 1000 milliLiter(s) (100 mL/Hr) IV Continuous <Continuous>  dextrose 5%. 1000 milliLiter(s) (50 mL/Hr) IV Continuous <Continuous>  dextrose 50% Injectable 25 Gram(s) IV Push once  dextrose 50% Injectable 12.5 Gram(s) IV Push once  dextrose 50% Injectable 25 Gram(s) IV Push once  glucagon  Injectable 1 milliGRAM(s) IntraMuscular once  heparin   Injectable 5000 Unit(s) SubCutaneous every 8 hours  insulin lispro (ADMELOG) corrective regimen sliding scale   SubCutaneous three times a day before meals  insulin lispro (ADMELOG) corrective regimen sliding scale   SubCutaneous at bedtime  lisinopril 5 milliGRAM(s) Oral daily  metoprolol tartrate 25 milliGRAM(s) Oral two times a day    MEDICATIONS  (PRN):  acetaminophen     Tablet .. 650 milliGRAM(s) Oral every 6 hours PRN Temp greater or equal to 38C (100.4F), Mild Pain (1 - 3)  aluminum hydroxide/magnesium hydroxide/simethicone Suspension 30 milliLiter(s) Oral every 4 hours PRN Dyspepsia  dextrose Oral Gel 15 Gram(s) Oral once PRN Blood Glucose LESS THAN 70 milliGRAM(s)/deciliter  melatonin 3 milliGRAM(s) Oral at bedtime PRN Insomnia  ondansetron Injectable 4 milliGRAM(s) IV Push every 8 hours PRN Nausea and/or Vomiting      Vital Signs Last 24 Hrs  T(C): 36.5 (12 May 2025 11:03), Max: 37.2 (11 May 2025 18:45)  T(F): 97.7 (12 May 2025 11:03), Max: 98.9 (11 May 2025 18:45)  HR: 65 (12 May 2025 11:03) (64 - 76)  BP: 146/82 (12 May 2025 11:03) (133/72 - 170/87)  BP(mean): --  RR: 18 (12 May 2025 11:03) (18 - 18)  SpO2: 97% (12 May 2025 11:03) (95% - 99%)    Parameters below as of 12 May 2025 11:03  Patient On (Oxygen Delivery Method): room air      CAPILLARY BLOOD GLUCOSE      POCT Blood Glucose.: 311 mg/dL (12 May 2025 11:16)  POCT Blood Glucose.: 243 mg/dL (12 May 2025 08:28)  POCT Blood Glucose.: 215 mg/dL (11 May 2025 21:34)  POCT Blood Glucose.: 212 mg/dL (11 May 2025 17:27)  POCT Blood Glucose.: 262 mg/dL (11 May 2025 12:05)    I&O's Summary      PHYSICAL EXAM:  GENERAL: NAD, well-developed  HEAD:  Atraumatic, Normocephalic  EYES: EOMI, PERRLA, conjunctiva and sclera clear  NECK: Supple, No JVD  CHEST/LUNG: Clear to auscultation bilaterally; No wheeze  HEART: Regular rate and rhythm; No murmurs, rubs, or gallops  ABDOMEN: Soft, Nontender, Nondistended; Bowel sounds present  EXTREMITIES:  2+ Peripheral Pulses, No clubbing, cyanosis, or edema  PSYCH: AAOx3  NEUROLOGY: non-focal  SKIN: No rashes or lesions    LABS:                        16.5   7.84  )-----------( 280      ( 12 May 2025 06:30 )             46.9     05-12    133[L]  |  99  |  12  ----------------------------<  241[H]  3.9   |  20[L]  |  0.77    Ca    8.7      12 May 2025 06:30    TPro  6.6  /  Alb  3.5  /  TBili  0.6  /  DBili  x   /  AST  18  /  ALT  18  /  AlkPhos  89  05-12    PT/INR - ( 11 May 2025 04:53 )   PT: 12.7 sec;   INR: 1.10 ratio         PTT - ( 11 May 2025 04:53 )  PTT:28.1 sec  CARDIAC MARKERS ( 11 May 2025 13:11 )  x     / x     / x     / x     / 2.0 ng/mL      Urinalysis Basic - ( 12 May 2025 06:30 )    Color: x / Appearance: x / SG: x / pH: x  Gluc: 241 mg/dL / Ketone: x  / Bili: x / Urobili: x   Blood: x / Protein: x / Nitrite: x   Leuk Esterase: x / RBC: x / WBC x   Sq Epi: x / Non Sq Epi: x / Bacteria: x        RADIOLOGY & ADDITIONAL TESTS:    Imaging Personally Reviewed:    Consultant(s) Notes Reviewed:      Care Discussed with Consultants/Other Providers:   Patient is a 74y old  Male who presents with a chief complaint of Chest pain (12 May 2025 11:44)      SUBJECTIVE / OVERNIGHT EVENTS:  Patient still with mild constant chest pressure which is improved from yesterday. Patient has no new complaints. Denies SOB, abdominal pain, N/V/D     MEDICATIONS  (STANDING):  aspirin enteric coated 81 milliGRAM(s) Oral daily  atorvastatin 40 milliGRAM(s) Oral at bedtime  dextrose 5%. 1000 milliLiter(s) (100 mL/Hr) IV Continuous <Continuous>  dextrose 5%. 1000 milliLiter(s) (50 mL/Hr) IV Continuous <Continuous>  dextrose 50% Injectable 25 Gram(s) IV Push once  dextrose 50% Injectable 12.5 Gram(s) IV Push once  dextrose 50% Injectable 25 Gram(s) IV Push once  glucagon  Injectable 1 milliGRAM(s) IntraMuscular once  heparin   Injectable 5000 Unit(s) SubCutaneous every 8 hours  insulin lispro (ADMELOG) corrective regimen sliding scale   SubCutaneous three times a day before meals  insulin lispro (ADMELOG) corrective regimen sliding scale   SubCutaneous at bedtime  lisinopril 5 milliGRAM(s) Oral daily  metoprolol tartrate 25 milliGRAM(s) Oral two times a day    MEDICATIONS  (PRN):  acetaminophen     Tablet .. 650 milliGRAM(s) Oral every 6 hours PRN Temp greater or equal to 38C (100.4F), Mild Pain (1 - 3)  aluminum hydroxide/magnesium hydroxide/simethicone Suspension 30 milliLiter(s) Oral every 4 hours PRN Dyspepsia  dextrose Oral Gel 15 Gram(s) Oral once PRN Blood Glucose LESS THAN 70 milliGRAM(s)/deciliter  melatonin 3 milliGRAM(s) Oral at bedtime PRN Insomnia  ondansetron Injectable 4 milliGRAM(s) IV Push every 8 hours PRN Nausea and/or Vomiting      Vital Signs Last 24 Hrs  T(C): 36.5 (12 May 2025 11:03), Max: 37.2 (11 May 2025 18:45)  T(F): 97.7 (12 May 2025 11:03), Max: 98.9 (11 May 2025 18:45)  HR: 65 (12 May 2025 11:03) (64 - 76)  BP: 146/82 (12 May 2025 11:03) (133/72 - 170/87)  BP(mean): --  RR: 18 (12 May 2025 11:03) (18 - 18)  SpO2: 97% (12 May 2025 11:03) (95% - 99%)    Parameters below as of 12 May 2025 11:03  Patient On (Oxygen Delivery Method): room air      CAPILLARY BLOOD GLUCOSE      POCT Blood Glucose.: 311 mg/dL (12 May 2025 11:16)  POCT Blood Glucose.: 243 mg/dL (12 May 2025 08:28)  POCT Blood Glucose.: 215 mg/dL (11 May 2025 21:34)  POCT Blood Glucose.: 212 mg/dL (11 May 2025 17:27)  POCT Blood Glucose.: 262 mg/dL (11 May 2025 12:05)    I&O's Summary      PHYSICAL EXAM:  GENERAL: NAD, well-developed  HEAD:  Atraumatic, Normocephalic  EYES: EOMI, PERRLA, conjunctiva and sclera clear  NECK: Supple, No JVD  CHEST/LUNG: Clear to auscultation bilaterally; No wheeze  HEART: Regular rate and rhythm; No murmurs, rubs, or gallops  ABDOMEN: Soft, Nontender, Nondistended; Bowel sounds present  EXTREMITIES:  2+ Peripheral Pulses, No clubbing, cyanosis, or edema  PSYCH: AAOx3  NEUROLOGY: non-focal  SKIN: No rashes or lesions    LABS:                        16.5   7.84  )-----------( 280      ( 12 May 2025 06:30 )             46.9     05-12    133[L]  |  99  |  12  ----------------------------<  241[H]  3.9   |  20[L]  |  0.77    Ca    8.7      12 May 2025 06:30    TPro  6.6  /  Alb  3.5  /  TBili  0.6  /  DBili  x   /  AST  18  /  ALT  18  /  AlkPhos  89  05-12    PT/INR - ( 11 May 2025 04:53 )   PT: 12.7 sec;   INR: 1.10 ratio         PTT - ( 11 May 2025 04:53 )  PTT:28.1 sec  CARDIAC MARKERS ( 11 May 2025 13:11 )  x     / x     / x     / x     / 2.0 ng/mL      Urinalysis Basic - ( 12 May 2025 06:30 )    Color: x / Appearance: x / SG: x / pH: x  Gluc: 241 mg/dL / Ketone: x  / Bili: x / Urobili: x   Blood: x / Protein: x / Nitrite: x   Leuk Esterase: x / RBC: x / WBC x   Sq Epi: x / Non Sq Epi: x / Bacteria: x        RADIOLOGY & ADDITIONAL TESTS:    Imaging Personally Reviewed:    Consultant(s) Notes Reviewed:      Care Discussed with Consultants/Other Providers:   Patient is a 74y old  Male who presents with a chief complaint of Chest pain (12 May 2025 11:44)      SUBJECTIVE / OVERNIGHT EVENTS:  Patient still with mild constant chest pressure which is improved from yesterday. Patient has no new complaints. Denies SOB, abdominal pain, N/V/D     MEDICATIONS  (STANDING):  aspirin enteric coated 81 milliGRAM(s) Oral daily  atorvastatin 40 milliGRAM(s) Oral at bedtime  dextrose 5%. 1000 milliLiter(s) (100 mL/Hr) IV Continuous <Continuous>  dextrose 5%. 1000 milliLiter(s) (50 mL/Hr) IV Continuous <Continuous>  dextrose 50% Injectable 25 Gram(s) IV Push once  dextrose 50% Injectable 12.5 Gram(s) IV Push once  dextrose 50% Injectable 25 Gram(s) IV Push once  glucagon  Injectable 1 milliGRAM(s) IntraMuscular once  heparin   Injectable 5000 Unit(s) SubCutaneous every 8 hours  insulin lispro (ADMELOG) corrective regimen sliding scale   SubCutaneous three times a day before meals  insulin lispro (ADMELOG) corrective regimen sliding scale   SubCutaneous at bedtime  lisinopril 5 milliGRAM(s) Oral daily  metoprolol tartrate 25 milliGRAM(s) Oral two times a day    MEDICATIONS  (PRN):  acetaminophen     Tablet .. 650 milliGRAM(s) Oral every 6 hours PRN Temp greater or equal to 38C (100.4F), Mild Pain (1 - 3)  aluminum hydroxide/magnesium hydroxide/simethicone Suspension 30 milliLiter(s) Oral every 4 hours PRN Dyspepsia  dextrose Oral Gel 15 Gram(s) Oral once PRN Blood Glucose LESS THAN 70 milliGRAM(s)/deciliter  melatonin 3 milliGRAM(s) Oral at bedtime PRN Insomnia  ondansetron Injectable 4 milliGRAM(s) IV Push every 8 hours PRN Nausea and/or Vomiting      Vital Signs Last 24 Hrs  T(C): 36.5 (12 May 2025 11:03), Max: 37.2 (11 May 2025 18:45)  T(F): 97.7 (12 May 2025 11:03), Max: 98.9 (11 May 2025 18:45)  HR: 65 (12 May 2025 11:03) (64 - 76)  BP: 146/82 (12 May 2025 11:03) (133/72 - 170/87)  BP(mean): --  RR: 18 (12 May 2025 11:03) (18 - 18)  SpO2: 97% (12 May 2025 11:03) (95% - 99%)    Parameters below as of 12 May 2025 11:03  Patient On (Oxygen Delivery Method): room air      CAPILLARY BLOOD GLUCOSE      POCT Blood Glucose.: 311 mg/dL (12 May 2025 11:16)  POCT Blood Glucose.: 243 mg/dL (12 May 2025 08:28)  POCT Blood Glucose.: 215 mg/dL (11 May 2025 21:34)  POCT Blood Glucose.: 212 mg/dL (11 May 2025 17:27)  POCT Blood Glucose.: 262 mg/dL (11 May 2025 12:05)    I&O's Summary      PHYSICAL EXAM:  GENERAL: NAD, well-developed  HEAD:  Atraumatic, Normocephalic  EYES: EOMI, PERRLA, conjunctiva and sclera clear  NECK: Supple, No JVD  CHEST/LUNG: Clear to auscultation bilaterally; No wheeze  HEART: Regular rate and rhythm; No murmurs, rubs, or gallops  ABDOMEN: Soft, Nontender, Nondistended; Bowel sounds present  EXTREMITIES:  2+ Peripheral Pulses, No clubbing, cyanosis, or edema  PSYCH: AAOx3  NEUROLOGY: non-focal  SKIN: No rashes or lesions    LABS:                        16.5   7.84  )-----------( 280      ( 12 May 2025 06:30 )             46.9     05-12    133[L]  |  99  |  12  ----------------------------<  241[H]  3.9   |  20[L]  |  0.77    Ca    8.7      12 May 2025 06:30    TPro  6.6  /  Alb  3.5  /  TBili  0.6  /  DBili  x   /  AST  18  /  ALT  18  /  AlkPhos  89  05-12    PT/INR - ( 11 May 2025 04:53 )   PT: 12.7 sec;   INR: 1.10 ratio         PTT - ( 11 May 2025 04:53 )  PTT:28.1 sec  CARDIAC MARKERS ( 11 May 2025 13:11 )  x     / x     / x     / x     / 2.0 ng/mL      Urinalysis Basic - ( 12 May 2025 06:30 )    Color: x / Appearance: x / SG: x / pH: x  Gluc: 241 mg/dL / Ketone: x  / Bili: x / Urobili: x   Blood: x / Protein: x / Nitrite: x   Leuk Esterase: x / RBC: x / WBC x   Sq Epi: x / Non Sq Epi: x / Bacteria: x        RADIOLOGY & ADDITIONAL TESTS:    Imaging Personally Reviewed: < from: TTE W or WO Ultrasound Enhancing Agent (05.12.25 @ 11:56) >  CONCLUSIONS:      1. Left ventricular cavity is normal in size. Left ventricular wall thickness is normal. Left ventricular systolic function is normal with an ejection fraction of 62 % by Linares's method of disks. There are no regional wall motion abnormalities seen.   2. There is mild (grade 1) left ventricular diastolic dysfunction.   3. Normal right ventricular cavity size and probably normal right ventricular systolic function. Tricuspid annular plane systolic excursion (TAPSE) is 2.1 cm (normal >=1.7 cm).   4. Structurally normal mitral valve with normal leaflet excursion. There is calcification of the mitral valve annulus. There is trace mitral regurgitation.   5. A bioprosthetic valve replacement is present in the aortic position. The peak transaortic velocity is 1.88 m/s, peak transaortic gradient is 14.1 mmHg and mean transaortic gradient is 7.0 mmHg with an LVOT/aortic valveVTI ratio of 0.54. There is no intravalvular regurgitation.    < end of copied text >      Consultant(s) Notes Reviewed:      Care Discussed with Consultants/Other Providers:

## 2025-05-12 NOTE — PROGRESS NOTE ADULT - PROBLEM SELECTOR PLAN 1
Significant underlying cardiac history including stent (>1 year ago) and CABG (>5 years ago)  Currently not taking any medications  Pain worse at rest  Minimal troponin elevation, 12 to 14  EKG unchanged per ED  - Cardio consult appreciated  - Nitro prn  - Metoprolol, lipitor, lisinopril started  - F/U TTE Significant underlying cardiac history including stent (>1 year ago) and CABG (>5 years ago)  Currently not taking any medications  Pain worse at rest  Minimal troponin elevation, 12 to 14  EKG unchanged per ED  - Cardio consult appreciated  - Nitro prn  - Metoprolol, lipitor, lisinopril started  - TTE negative for acute changes  -Cardiology to do stress test on 5/13

## 2025-05-12 NOTE — PROGRESS NOTE ADULT - ASSESSMENT
74 y.o. Male w/ hx  DM, HTN, CAD s/p stent (>1 year ago) and CABG (>5 years ago), BPH, HLD, who presents with worsening unstable angina x3-4 days.  74 y.o. Male w/ hx  DM, HTN, CAD s/p stent (>1 year ago) and CABG (>5 years ago), Aortic bioprosthetic valve, BPH, HLD, who presents with worsening unstable angina x3-4 days.

## 2025-05-13 ENCOUNTER — RESULT REVIEW (OUTPATIENT)
Age: 75
End: 2025-05-13

## 2025-05-13 LAB
ANION GAP SERPL CALC-SCNC: 14 MMOL/L — SIGNIFICANT CHANGE UP (ref 7–14)
BUN SERPL-MCNC: 16 MG/DL — SIGNIFICANT CHANGE UP (ref 7–23)
CALCIUM SERPL-MCNC: 9 MG/DL — SIGNIFICANT CHANGE UP (ref 8.4–10.5)
CHLORIDE SERPL-SCNC: 100 MMOL/L — SIGNIFICANT CHANGE UP (ref 98–107)
CO2 SERPL-SCNC: 19 MMOL/L — LOW (ref 22–31)
CREAT SERPL-MCNC: 0.79 MG/DL — SIGNIFICANT CHANGE UP (ref 0.5–1.3)
CRP SERPL-MCNC: 4.7 MG/L — SIGNIFICANT CHANGE UP
EGFR: 94 ML/MIN/1.73M2 — SIGNIFICANT CHANGE UP
EGFR: 94 ML/MIN/1.73M2 — SIGNIFICANT CHANGE UP
ERYTHROCYTE [SEDIMENTATION RATE] IN BLOOD: 4 MM/HR — SIGNIFICANT CHANGE UP (ref 1–15)
GLUCOSE BLDC GLUCOMTR-MCNC: 160 MG/DL — HIGH (ref 70–99)
GLUCOSE BLDC GLUCOMTR-MCNC: 195 MG/DL — HIGH (ref 70–99)
GLUCOSE BLDC GLUCOMTR-MCNC: 208 MG/DL — HIGH (ref 70–99)
GLUCOSE BLDC GLUCOMTR-MCNC: 238 MG/DL — HIGH (ref 70–99)
GLUCOSE SERPL-MCNC: 195 MG/DL — HIGH (ref 70–99)
HCT VFR BLD CALC: 48.2 % — SIGNIFICANT CHANGE UP (ref 39–50)
HGB BLD-MCNC: 16.9 G/DL — SIGNIFICANT CHANGE UP (ref 13–17)
MAGNESIUM SERPL-MCNC: 2.1 MG/DL — SIGNIFICANT CHANGE UP (ref 1.6–2.6)
MCHC RBC-ENTMCNC: 31.8 PG — SIGNIFICANT CHANGE UP (ref 27–34)
MCHC RBC-ENTMCNC: 35.1 G/DL — SIGNIFICANT CHANGE UP (ref 32–36)
MCV RBC AUTO: 90.6 FL — SIGNIFICANT CHANGE UP (ref 80–100)
NRBC # BLD AUTO: 0 K/UL — SIGNIFICANT CHANGE UP (ref 0–0)
NRBC # FLD: 0 K/UL — SIGNIFICANT CHANGE UP (ref 0–0)
NRBC BLD AUTO-RTO: 0 /100 WBCS — SIGNIFICANT CHANGE UP (ref 0–0)
PHOSPHATE SERPL-MCNC: 2.6 MG/DL — SIGNIFICANT CHANGE UP (ref 2.5–4.5)
PLATELET # BLD AUTO: 306 K/UL — SIGNIFICANT CHANGE UP (ref 150–400)
POTASSIUM SERPL-MCNC: 3.9 MMOL/L — SIGNIFICANT CHANGE UP (ref 3.5–5.3)
POTASSIUM SERPL-SCNC: 3.9 MMOL/L — SIGNIFICANT CHANGE UP (ref 3.5–5.3)
RBC # BLD: 5.32 M/UL — SIGNIFICANT CHANGE UP (ref 4.2–5.8)
RBC # FLD: 11.8 % — SIGNIFICANT CHANGE UP (ref 10.3–14.5)
SODIUM SERPL-SCNC: 133 MMOL/L — LOW (ref 135–145)
WBC # BLD: 9.42 K/UL — SIGNIFICANT CHANGE UP (ref 3.8–10.5)
WBC # FLD AUTO: 9.42 K/UL — SIGNIFICANT CHANGE UP (ref 3.8–10.5)

## 2025-05-13 PROCEDURE — 93016 CV STRESS TEST SUPVJ ONLY: CPT | Mod: GC

## 2025-05-13 PROCEDURE — 78452 HT MUSCLE IMAGE SPECT MULT: CPT | Mod: 26

## 2025-05-13 PROCEDURE — 99222 1ST HOSP IP/OBS MODERATE 55: CPT

## 2025-05-13 PROCEDURE — 99233 SBSQ HOSP IP/OBS HIGH 50: CPT

## 2025-05-13 PROCEDURE — 93018 CV STRESS TEST I&R ONLY: CPT | Mod: GC

## 2025-05-13 RX ORDER — INSULIN LISPRO 100 U/ML
4 INJECTION, SOLUTION INTRAVENOUS; SUBCUTANEOUS
Refills: 0 | Status: DISCONTINUED | OUTPATIENT
Start: 2025-05-13 | End: 2025-05-14

## 2025-05-13 RX ORDER — INSULIN GLARGINE-YFGN 100 [IU]/ML
12 INJECTION, SOLUTION SUBCUTANEOUS AT BEDTIME
Refills: 0 | Status: DISCONTINUED | OUTPATIENT
Start: 2025-05-13 | End: 2025-05-14

## 2025-05-13 RX ADMIN — HEPARIN SODIUM 5000 UNIT(S): 1000 INJECTION INTRAVENOUS; SUBCUTANEOUS at 21:56

## 2025-05-13 RX ADMIN — LISINOPRIL 5 MILLIGRAM(S): 5 TABLET ORAL at 06:16

## 2025-05-13 RX ADMIN — INSULIN LISPRO 2: 100 INJECTION, SOLUTION INTRAVENOUS; SUBCUTANEOUS at 17:37

## 2025-05-13 RX ADMIN — INSULIN LISPRO 1: 100 INJECTION, SOLUTION INTRAVENOUS; SUBCUTANEOUS at 07:47

## 2025-05-13 RX ADMIN — METOPROLOL SUCCINATE 25 MILLIGRAM(S): 50 TABLET, EXTENDED RELEASE ORAL at 17:38

## 2025-05-13 RX ADMIN — ATORVASTATIN CALCIUM 40 MILLIGRAM(S): 80 TABLET, FILM COATED ORAL at 21:56

## 2025-05-13 RX ADMIN — Medication 3 MILLIGRAM(S): at 21:55

## 2025-05-13 RX ADMIN — INSULIN LISPRO 4 UNIT(S): 100 INJECTION, SOLUTION INTRAVENOUS; SUBCUTANEOUS at 17:37

## 2025-05-13 RX ADMIN — INSULIN GLARGINE-YFGN 12 UNIT(S): 100 INJECTION, SOLUTION SUBCUTANEOUS at 23:03

## 2025-05-13 NOTE — PROGRESS NOTE ADULT - PROBLEM SELECTOR PLAN 1
Significant underlying cardiac history including stent (>1 year ago) and CABG (>5 years ago)  Currently not taking any medications  Pain worse at rest  Minimal troponin elevation, 12 to 14  EKG unchanged per ED  - Cardio consult appreciated  - Nitro prn  - Metoprolol, lipitor, lisinopril started  - TTE negative for acute changes  -Cardiology to do nuclear stress test on 5/13

## 2025-05-13 NOTE — CONSULT NOTE ADULT - ASSESSMENT
74y year old Male  with  T2DM with hyperglycemia, and CAD admitted with chest pain.       1)Type 2 Diabetes Mellitus  Home regimen: no recent meds, previously on metformin  HbA1c     10.0    - Inpatient glucose target 100-180   - Recommend Basal bolus insulin while inpt  HE has had continued fasting and postprandial hyperglycemia, recommend increasing lantus to 12 units at bedtime and admelog to 4 units before meals  - CONtinue Admelog correction scales before meals and at bedtime  - RD consult for nutritional counseling  LIkely discharge on metformin 500 mg bid and Ozempic 0.25 mg weekly.  COunseled pt on CV benefits of metformin  Pt will need education in use of ozempic pen.  Pt should follow up with Endocrinology post discharge: [phone]    2)Hypertension  continue metoprolol, lisinopril      3)Hyperlipidemia  - continue atorvastatin    Communicated with primary team.    Irene Kearns MD  On 05-13-25 : via Teams or pager    A different provider may be covering this patient each day.     Diabetes/Endocrine team: [phone]   or email ____   [email or web address]                _x___  [email or web address]

## 2025-05-13 NOTE — CONSULT NOTE ADULT - SUBJECTIVE AND OBJECTIVE BOX
HPI:  Mr. Batres is a 74 year old man with T2DM with hyperglycemia and  CAD  admitted with chest pain      Pt has had DM for  15  years  Managed by his PCP  Diabetes Medication: He had taken metformin in the past but was told that it was dangerous and his PCP slowly decreased and stopped it.  He reports good control in the past with diet alone.  In the past few months he has been under a lot of stress and has not been limiting carbohydrate intake.  Blood glucose values have increased, previously i 110 - 120 range, recently in 200 - 240 mg/dl range.  He is active at work, no formal exercise.  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  Works as .  No tobacco, etoh, drugs    Outpatient Medications:  none      MEDICATIONS  (STANDING):  aspirin enteric coated 81 milliGRAM(s) Oral daily  atorvastatin 40 milliGRAM(s) Oral at bedtime  dextrose 5%. 1000 milliLiter(s) (100 mL/Hr) IV Continuous <Continuous>  dextrose 5%. 1000 milliLiter(s) (50 mL/Hr) IV Continuous <Continuous>  dextrose 50% Injectable 25 Gram(s) IV Push once  dextrose 50% Injectable 12.5 Gram(s) IV Push once  dextrose 50% Injectable 25 Gram(s) IV Push once  glucagon  Injectable 1 milliGRAM(s) IntraMuscular once  heparin   Injectable 5000 Unit(s) SubCutaneous every 8 hours  insulin glargine Injectable (LANTUS) 10 Unit(s) SubCutaneous at bedtime  insulin lispro (ADMELOG) corrective regimen sliding scale   SubCutaneous three times a day before meals  insulin lispro (ADMELOG) corrective regimen sliding scale   SubCutaneous at bedtime  insulin lispro Injectable (ADMELOG) 2 Unit(s) SubCutaneous three times a day before meals  lisinopril 5 milliGRAM(s) Oral daily  metoprolol tartrate 25 milliGRAM(s) Oral two times a day    MEDICATIONS  (PRN):  acetaminophen     Tablet .. 650 milliGRAM(s) Oral every 6 hours PRN Temp greater or equal to 38C (100.4F), Mild Pain (1 - 3)  aluminum hydroxide/magnesium hydroxide/simethicone Suspension 30 milliLiter(s) Oral every 4 hours PRN Dyspepsia  dextrose Oral Gel 15 Gram(s) Oral once PRN Blood Glucose LESS THAN 70 milliGRAM(s)/deciliter  melatonin 3 milliGRAM(s) Oral at bedtime PRN Insomnia  ondansetron Injectable 4 milliGRAM(s) IV Push every 8 hours PRN Nausea and/or Vomiting      Allergies    No Known Allergies    Intolerances      Review of Systems:  Constitutional: No fever  Eyes: No blurry vision  Neuro: No headache  HEENT: No throat pain  Cardiovascular: Has ongoing chest pressure  Respiratory: No SOB, no cough  GI: No abdominal pain  : No dysuria  Skin: no rash  Psych: no depression  Endocrine: as noted in HPI  Hem/lymph: no swelling      PHYSICAL EXAM:  VITALS: T(C): 36.9 (05-13-25 @ 10:42)  T(F): 98.4 (05-13-25 @ 10:42), Max: 98.5 (05-12-25 @ 17:02)  HR: 63 (05-13-25 @ 10:42) (63 - 68)  BP: 121/79 (05-13-25 @ 10:42) (121/79 - 133/75)  RR:  (16 - 18)  SpO2:  (98% - 99%)  Wt(kg): 108.9  GENERAL: NAD,   EYES: No proptosis,  HEENT:  Atraumatic, Normocephalic,   THYROID: Normal size,  RESPIRATORY: Clear to auscultation bilaterally  CARDIOVASCULAR: Regular rhythm; No murmurs; no peripheral edema  GI: Soft, nontender, non distended, normal bowel sounds  SKIN: Dry, intact, No rashes or lesions  MUSCULOSKELETAL: normal strength  NEURO: extraocular movements intact, no tremor,  PSYCH: Alert and oriented x 3, normal affect, normal mood      POCT Blood Glucose.: 160 mg/dL (05-13-25 @ 12:20)  POCT Blood Glucose.: 195 mg/dL (05-13-25 @ 06:30)  POCT Blood Glucose.: 231 mg/dL (05-12-25 @ 22:19)  POCT Blood Glucose.: 184 mg/dL (05-12-25 @ 17:25)  POCT Blood Glucose.: 231 mg/dL (05-12-25 @ 12:42)  POCT Blood Glucose.: 311 mg/dL (05-12-25 @ 11:16)  POCT Blood Glucose.: 243 mg/dL (05-12-25 @ 08:28)  POCT Blood Glucose.: 215 mg/dL (05-11-25 @ 21:34)  POCT Blood Glucose.: 212 mg/dL (05-11-25 @ 17:27)  POCT Blood Glucose.: 262 mg/dL (05-11-25 @ 12:05)  POCT Blood Glucose.: 270 mg/dL (05-11-25 @ 10:17)                            16.9   9.42  )-----------( 306      ( 13 May 2025 04:45 )             48.2       05-13    133[L]  |  100  |  16  ----------------------------<  195[H]  3.9   |  19[L]  |  0.79    eGFR: 94    Ca    9.0      05-13  Mg     2.10     05-13  Phos  2.6     05-13    TPro  6.6  /  Alb  3.5  /  TBili  0.6  /  DBili  x   /  AST  18  /  ALT  18  /  AlkPhos  89  05-12    Thyroid Function Tests:      A1C with Estimated Average Glucose Result: 10.0 % (05-11-25 @ 04:53)        Radiology:

## 2025-05-13 NOTE — PROGRESS NOTE ADULT - SUBJECTIVE AND OBJECTIVE BOX
Patient is a 74y old  Male who presents with a chief complaint of Chest pain (13 May 2025 07:30)      SUBJECTIVE / OVERNIGHT EVENTS:  Patient has no new complaints. Still with chest pressure but not as bad as before. Denies SOB, abdominal pain, N/V/D     MEDICATIONS  (STANDING):  aspirin enteric coated 81 milliGRAM(s) Oral daily  atorvastatin 40 milliGRAM(s) Oral at bedtime  dextrose 5%. 1000 milliLiter(s) (100 mL/Hr) IV Continuous <Continuous>  dextrose 5%. 1000 milliLiter(s) (50 mL/Hr) IV Continuous <Continuous>  dextrose 50% Injectable 25 Gram(s) IV Push once  dextrose 50% Injectable 12.5 Gram(s) IV Push once  dextrose 50% Injectable 25 Gram(s) IV Push once  glucagon  Injectable 1 milliGRAM(s) IntraMuscular once  heparin   Injectable 5000 Unit(s) SubCutaneous every 8 hours  insulin glargine Injectable (LANTUS) 10 Unit(s) SubCutaneous at bedtime  insulin lispro (ADMELOG) corrective regimen sliding scale   SubCutaneous three times a day before meals  insulin lispro (ADMELOG) corrective regimen sliding scale   SubCutaneous at bedtime  insulin lispro Injectable (ADMELOG) 2 Unit(s) SubCutaneous three times a day before meals  lisinopril 5 milliGRAM(s) Oral daily  metoprolol tartrate 25 milliGRAM(s) Oral two times a day    MEDICATIONS  (PRN):  acetaminophen     Tablet .. 650 milliGRAM(s) Oral every 6 hours PRN Temp greater or equal to 38C (100.4F), Mild Pain (1 - 3)  aluminum hydroxide/magnesium hydroxide/simethicone Suspension 30 milliLiter(s) Oral every 4 hours PRN Dyspepsia  dextrose Oral Gel 15 Gram(s) Oral once PRN Blood Glucose LESS THAN 70 milliGRAM(s)/deciliter  melatonin 3 milliGRAM(s) Oral at bedtime PRN Insomnia  ondansetron Injectable 4 milliGRAM(s) IV Push every 8 hours PRN Nausea and/or Vomiting      Vital Signs Last 24 Hrs  T(C): 36.7 (13 May 2025 05:03), Max: 36.9 (12 May 2025 15:20)  T(F): 98 (13 May 2025 05:03), Max: 98.5 (12 May 2025 17:02)  HR: 65 (13 May 2025 05:03) (65 - 68)  BP: 130/86 (13 May 2025 05:03) (121/68 - 133/75)  BP(mean): --  RR: 16 (13 May 2025 05:03) (16 - 18)  SpO2: 99% (13 May 2025 05:03) (98% - 99%)    Parameters below as of 13 May 2025 05:03  Patient On (Oxygen Delivery Method): room air      CAPILLARY BLOOD GLUCOSE      POCT Blood Glucose.: 160 mg/dL (13 May 2025 12:20)  POCT Blood Glucose.: 195 mg/dL (13 May 2025 06:30)  POCT Blood Glucose.: 231 mg/dL (12 May 2025 22:19)  POCT Blood Glucose.: 184 mg/dL (12 May 2025 17:25)    I&O's Summary      PHYSICAL EXAM:  GENERAL: NAD, well-developed  HEAD:  Atraumatic, Normocephalic  EYES: EOMI, PERRLA, conjunctiva and sclera clear  NECK: Supple, No JVD  CHEST/LUNG: Clear to auscultation bilaterally; No wheeze  HEART: Regular rate and rhythm; No murmurs, rubs, or gallops  ABDOMEN: Soft, Nontender, Nondistended; Bowel sounds present  EXTREMITIES:  2+ Peripheral Pulses, No clubbing, cyanosis, or edema  PSYCH: AAOx3  NEUROLOGY: non-focal  SKIN: No rashes or lesions    LABS:                        16.9   9.42  )-----------( 306      ( 13 May 2025 04:45 )             48.2     05-13    133[L]  |  100  |  16  ----------------------------<  195[H]  3.9   |  19[L]  |  0.79    Ca    9.0      13 May 2025 04:45  Phos  2.6     05-13  Mg     2.10     05-13    TPro  6.6  /  Alb  3.5  /  TBili  0.6  /  DBili  x   /  AST  18  /  ALT  18  /  AlkPhos  89  05-12          Urinalysis Basic - ( 13 May 2025 04:45 )    Color: x / Appearance: x / SG: x / pH: x  Gluc: 195 mg/dL / Ketone: x  / Bili: x / Urobili: x   Blood: x / Protein: x / Nitrite: x   Leuk Esterase: x / RBC: x / WBC x   Sq Epi: x / Non Sq Epi: x / Bacteria: x        RADIOLOGY & ADDITIONAL TESTS:    Imaging Personally Reviewed:    Consultant(s) Notes Reviewed:      Care Discussed with Consultants/Other Providers:

## 2025-05-13 NOTE — PROGRESS NOTE ADULT - SUBJECTIVE AND OBJECTIVE BOX
Cardiology Progress Note  ------------------------------------------------------------------------------------------  SUBJECTIVE:   No events overnight. TTE w/ pEF, no RWMAs, normally functioning bioprosthetic AV. NPO for NM stress test today.    -------------------------------------------------------------------------------------------  ROS:  CV: chest pain (-), palpitation (-), orthopnea (-), PND (-), edema (-)  PULM: SOB (-), cough (-), wheezing (-), hemoptysis (-).   CONST: fever (-), chills (-) or fatigue (-)  GI: abdominal distension (-), abdominal pain (-) , nausea/vomiting (-), hematemesis, (-), melena (-), hematochezia (-)  : dysuria (-), frequency (-), hematuria (-).   NEURO: numbness (-), weakness (-), dizziness (-)  MSK: myalgia (-), joint pain (-)   SKIN: itching (-), rash (-)  HEENT:  visual changes (-); vertigo or throat pain (-);  neck stiffness (-)   Psych: change in mood (-), anxiety (-), depression (-)     All other review of systems is negative unless indicated above.   -------------------------------------------------------------------------------------------  VS:  T(F): 98 (05-13), Max: 98.5 (05-12)  HR: 65 (05-13) (65 - 68)  BP: 130/86 (05-13) (121/68 - 146/82)  RR: 16 (05-13)  SpO2: 99% (05-13)  I&O's Summary    ------------------------------------------------------------------------------------------  PHYSICAL EXAM:  GENERAL: NAD  HEAD: Atraumatic, Normocephalic.  ENT: EOMI, Moist mucous membranes.  NECK: Supple  CHEST/LUNG: Clear to auscultation bilaterally; No rales, rhonchi, wheezing, or rubs. Unlabored respirations; area of CP on L lower chest tender to palpation  HEART: Regular rate and rhythm; No murmurs, rubs, or gallops.  EXTREMITIES: extremities warm, well perfused. No noted LE edema.     -------------------------------------------------------------------------------------------  LABS:  05-12    133[L]  |  99  |  12  ----------------------------<  241[H]  3.9   |  20[L]  |  0.77    Ca    8.7      12 May 2025 06:30    TPro  6.6  /  Alb  3.5  /  TBili  0.6  /  DBili  x   /  AST  18  /  ALT  18  /  AlkPhos  89  05-12    Creatinine Trend: 0.77<--, 0.75<--, 0.77<--                        16.9   9.42  )-----------( 306      ( 13 May 2025 04:45 )             48.2         Lipid Panel: T(F): 98 (05-13), Max: 98.5 (05-12)  HR: 65 (05-13) (65 - 68)  BP: 130/86 (05-13) (121/68 - 146/82)  RR: 16 (05-13)  SpO2: 99% (05-13)  Cardiac Enzymes: CARDIAC MARKERS ( 11 May 2025 13:11 )  x     / x     / x     / x     / 2.0 ng/mL          -------------------------------------------------------------------------------------------  Meds:  acetaminophen     Tablet .. 650 milliGRAM(s) Oral every 6 hours PRN  aluminum hydroxide/magnesium hydroxide/simethicone Suspension 30 milliLiter(s) Oral every 4 hours PRN  aspirin enteric coated 81 milliGRAM(s) Oral daily  atorvastatin 40 milliGRAM(s) Oral at bedtime  dextrose 5%. 1000 milliLiter(s) IV Continuous <Continuous>  dextrose 5%. 1000 milliLiter(s) IV Continuous <Continuous>  dextrose 50% Injectable 25 Gram(s) IV Push once  dextrose 50% Injectable 12.5 Gram(s) IV Push once  dextrose 50% Injectable 25 Gram(s) IV Push once  dextrose Oral Gel 15 Gram(s) Oral once PRN  glucagon  Injectable 1 milliGRAM(s) IntraMuscular once  heparin   Injectable 5000 Unit(s) SubCutaneous every 8 hours  insulin glargine Injectable (LANTUS) 10 Unit(s) SubCutaneous at bedtime  insulin lispro (ADMELOG) corrective regimen sliding scale   SubCutaneous three times a day before meals  insulin lispro (ADMELOG) corrective regimen sliding scale   SubCutaneous at bedtime  insulin lispro Injectable (ADMELOG) 2 Unit(s) SubCutaneous three times a day before meals  lisinopril 5 milliGRAM(s) Oral daily  melatonin 3 milliGRAM(s) Oral at bedtime PRN  metoprolol tartrate 25 milliGRAM(s) Oral two times a day  ondansetron Injectable 4 milliGRAM(s) IV Push every 8 hours PRN    -------------------------------------------------------------------------------------------  Cardiovascular Diagnostic Testing:  -------------------------------------------------------------------------------------------  ECG: NSR, 1st degree AVB, LAFB    Echo:   TTE 5/12/25     CONCLUSIONS:      1. Left ventricular cavity is normal in size. Left ventricular wall thickness is normal. Left ventricular systolic function is normal with an ejection fraction of 62 % by Linares's method of disks. There are no regional wall motion abnormalities seen.   2. There is mild (grade 1) left ventricular diastolic dysfunction.   3. Normal right ventricular cavity size and probably normal right ventricular systolic function. Tricuspid annular plane systolic excursion (TAPSE) is 2.1 cm (normal >=1.7 cm).   4. Structurally normal mitral valve with normal leaflet excursion. There is calcification of the mitral valve annulus. There is trace mitral regurgitation.   5. A bioprosthetic valve replacement is present in the aortic position. The peak transaortic velocity is 1.88 m/s, peak transaortic gradient is 14.1 mmHg and mean transaortic gradient is 7.0 mmHg with an LVOT/aortic valveVTI ratio of 0.54. There is no intravalvular regurgitation.      CXR:  reviewed  -------------------------------------------------------------------------------------------  Assessment and Plan:   -------------------------------------------------------------------------------------------  Mr. Batres is a 74 year old man with PMHx DM, HTN, CAD s/p stent (>1 year ago) and CABG and AVR forr biscuspid aortic valve (>5 years ago), BPH, HLD, who presents with intermediate-risk chest pain. Serial negative hs-Troponins have ruled-out ACS. TTE with pEF, grade I DD, s/p bio-AVR with no significant valvular pathology. Now pending NM stress test.     Problems Assessed:   #Chest pain  #CAD s/p PCI and CABG  #AS s/p AVR    Plan:   - Chest pain atypical, reproducible and at time positionally related. Serial hsTrops WNL.   - TTE with pEF, grade I DD, s/p bio-AVR with no significant valvular pathology  - Given high risk features of initial presentation, plan for NM (exercise) stress today. Is NPO  - DC home vs. follow up [facility] based on stress test results    ------------------------------------------------------------------------------------------  Jose Luis Patino MD   of Cardiology  [facility]  [address]  [place] [address]  Phone: [phone]  Fax: [phone]  Please check [email or web address]: [email or web address] for cardiology service schedule and contact information.   ------------------------------------------------------------------------------------------  Billing Statement:   76/56/51/36 minutes spent on total encounter. Necessity of time spent during this encounter on this date of service was due to review of medical information in EMR, co-ordination of hospital and outpatient care, discussion with patient and communication with primary team.   ------------------------------------------------------------------------------------------- Cardiology Progress Note  ------------------------------------------------------------------------------------------  SUBJECTIVE:   No events overnight. TTE w/ pEF, no RWMAs, normally functioning bioprosthetic AV. NPO for NM stress test today.    -------------------------------------------------------------------------------------------  ROS:  CV: chest pain (-), palpitation (-), orthopnea (-), PND (-), edema (-)  PULM: SOB (-), cough (-), wheezing (-), hemoptysis (-).   CONST: fever (-), chills (-) or fatigue (-)  GI: abdominal distension (-), abdominal pain (-) , nausea/vomiting (-), hematemesis, (-), melena (-), hematochezia (-)  : dysuria (-), frequency (-), hematuria (-).   NEURO: numbness (-), weakness (-), dizziness (-)  MSK: myalgia (-), joint pain (-)   SKIN: itching (-), rash (-)  HEENT:  visual changes (-); vertigo or throat pain (-);  neck stiffness (-)   Psych: change in mood (-), anxiety (-), depression (-)     All other review of systems is negative unless indicated above.   -------------------------------------------------------------------------------------------  VS:  T(F): 98 (05-13), Max: 98.5 (05-12)  HR: 65 (05-13) (65 - 68)  BP: 130/86 (05-13) (121/68 - 146/82)  RR: 16 (05-13)  SpO2: 99% (05-13)  I&O's Summary    ------------------------------------------------------------------------------------------  PHYSICAL EXAM:  GENERAL: NAD  HEAD: Atraumatic, Normocephalic.  ENT: EOMI, Moist mucous membranes.  NECK: Supple  CHEST/LUNG: Clear to auscultation bilaterally; No rales, rhonchi, wheezing, or rubs. Unlabored respirations; area of CP on L lower chest tender to palpation  HEART: Regular rate and rhythm; No murmurs, rubs, or gallops.  EXTREMITIES: extremities warm, well perfused. No noted LE edema.     -------------------------------------------------------------------------------------------  LABS:  05-12    133[L]  |  99  |  12  ----------------------------<  241[H]  3.9   |  20[L]  |  0.77    Ca    8.7      12 May 2025 06:30    TPro  6.6  /  Alb  3.5  /  TBili  0.6  /  DBili  x   /  AST  18  /  ALT  18  /  AlkPhos  89  05-12    Creatinine Trend: 0.77<--, 0.75<--, 0.77<--                        16.9   9.42  )-----------( 306      ( 13 May 2025 04:45 )             48.2         Lipid Panel: T(F): 98 (05-13), Max: 98.5 (05-12)  HR: 65 (05-13) (65 - 68)  BP: 130/86 (05-13) (121/68 - 146/82)  RR: 16 (05-13)  SpO2: 99% (05-13)  Cardiac Enzymes: CARDIAC MARKERS ( 11 May 2025 13:11 )  x     / x     / x     / x     / 2.0 ng/mL          -------------------------------------------------------------------------------------------  Meds:  acetaminophen     Tablet .. 650 milliGRAM(s) Oral every 6 hours PRN  aluminum hydroxide/magnesium hydroxide/simethicone Suspension 30 milliLiter(s) Oral every 4 hours PRN  aspirin enteric coated 81 milliGRAM(s) Oral daily  atorvastatin 40 milliGRAM(s) Oral at bedtime  dextrose 5%. 1000 milliLiter(s) IV Continuous <Continuous>  dextrose 5%. 1000 milliLiter(s) IV Continuous <Continuous>  dextrose 50% Injectable 25 Gram(s) IV Push once  dextrose 50% Injectable 12.5 Gram(s) IV Push once  dextrose 50% Injectable 25 Gram(s) IV Push once  dextrose Oral Gel 15 Gram(s) Oral once PRN  glucagon  Injectable 1 milliGRAM(s) IntraMuscular once  heparin   Injectable 5000 Unit(s) SubCutaneous every 8 hours  insulin glargine Injectable (LANTUS) 10 Unit(s) SubCutaneous at bedtime  insulin lispro (ADMELOG) corrective regimen sliding scale   SubCutaneous three times a day before meals  insulin lispro (ADMELOG) corrective regimen sliding scale   SubCutaneous at bedtime  insulin lispro Injectable (ADMELOG) 2 Unit(s) SubCutaneous three times a day before meals  lisinopril 5 milliGRAM(s) Oral daily  melatonin 3 milliGRAM(s) Oral at bedtime PRN  metoprolol tartrate 25 milliGRAM(s) Oral two times a day  ondansetron Injectable 4 milliGRAM(s) IV Push every 8 hours PRN    -------------------------------------------------------------------------------------------  Cardiovascular Diagnostic Testing:  -------------------------------------------------------------------------------------------  ECG: NSR, 1st degree AVB, LAFB    Echo:   TTE 5/12/25     CONCLUSIONS:      1. Left ventricular cavity is normal in size. Left ventricular wall thickness is normal. Left ventricular systolic function is normal with an ejection fraction of 62 % by Linares's method of disks. There are no regional wall motion abnormalities seen.   2. There is mild (grade 1) left ventricular diastolic dysfunction.   3. Normal right ventricular cavity size and probably normal right ventricular systolic function. Tricuspid annular plane systolic excursion (TAPSE) is 2.1 cm (normal >=1.7 cm).   4. Structurally normal mitral valve with normal leaflet excursion. There is calcification of the mitral valve annulus. There is trace mitral regurgitation.   5. A bioprosthetic valve replacement is present in the aortic position. The peak transaortic velocity is 1.88 m/s, peak transaortic gradient is 14.1 mmHg and mean transaortic gradient is 7.0 mmHg with an LVOT/aortic valveVTI ratio of 0.54. There is no intravalvular regurgitation.      CXR:  reviewed  -------------------------------------------------------------------------------------------  Assessment and Plan:   -------------------------------------------------------------------------------------------  Mr. Batres is a 74 year old man with PMHx DM, HTN, CAD s/p stent (>1 year ago) and CABG and AVR forr biscuspid aortic valve (>5 years ago), BPH, HLD, who presents with intermediate-risk chest pain. Serial negative hs-Troponins have ruled-out ACS. TTE with pEF, grade I DD, s/p bio-AVR with no significant valvular pathology. Now pending NM stress test.     Problems Assessed:   #Chest pain  #CAD s/p PCI and CABG  #AS s/p AVR    Plan:   - Chest pain atypical, reproducible and at time positionally related. Serial hsTrops WNL.   - TTE with pEF, grade I DD, s/p bio-AVR with no significant valvular pathology  - Given high risk features of initial presentation, plan for NM (exercise) stress today. Is NPO  ------------------------------------------------------------------------------------------  Jose Luis Patino MD   of Cardiology  [facility]  [address], [address]  [place] [address]  Phone: [phone]  Fax: [phone]  Please check [email or web address]: [email or web address] for cardiology service schedule and contact information.   ------------------------------------------------------------------------------------------  Billing Statement:   51 minutes spent on total encounter. Necessity of time spent during this encounter on this date of service was due to review of medical information in EMR, co-ordination of hospital and outpatient care, discussion with patient and communication with primary team.   -------------------------------------------------------------------------------------------

## 2025-05-13 NOTE — CONSULT NOTE ADULT - CONSULT REQUESTED BY NAME
Chief Complaint   Patient presents with   • Follow-up     8 weeks for dizziness, saw vestibular rehab and also had quinapril decreased       SUBJECTIVE:  Venus Guerrier is a 89 year old female presenting for follow-up of her dizziness. Patient did see physical therapy and did have evaluation for her dizziness. I did review that note. Patient does state that her dizziness is about the same at but no worse but no better. We did discuss possible causes of her dizziness including her medications. We discussed the risks and benefits of her medications including her opioid analgesics. We discussed the possibility of neurology evaluation. We have discussed that also in the past. Patient is not sure that she would like to proceed with anything else at this time and she does feels though she is stable and she is content with her daily activities. His son is also present for visit. Patient currently denies any visual disturbances. Denies any shortness breath or chest discomfort. No extremity edema. Her appetite is within normal limits. No changes with urination or bowels except that she states she gets occasional loose stools and she is on stool softener and laxative due to her chronic use of opioid analgesic. We did discuss backing off of the stool softener but continuing to use the laxative as needed. They are both in agreement with this.   PAST MEDICAL HISTORY:    HTN (hypertension)                                            Osteoporosis                                                  Compression fracture                                            Comment: T12 and L3    GERD (gastroesophageal reflux disease)                        Chronic pain                                                  Diverticulosis                                                Anemia                                                        Allergy                                                       Patient Active Problem List   Diagnosis   • 
Essential hypertension   • Osteoporosis   • Compression fracture   • GERD (gastroesophageal reflux disease)   • Chronic pain   • Diverticulosis   • Hyperlipemia   • Edema extremities   • Rotator cuff (capsule) sprain   • Iron deficiency anemia   • Thrombocytosis (CMS/HCC)   • Impaired fasting glucose   • Mood change (CMS/HCC)   • DNR (do not resuscitate)   • Urine frequency   • Reactive depression   • Aortic stenosis, moderate   • Physical deconditioning   • Vertebral basilar insufficiency       Past Surgical History:   Procedure Laterality Date   • APPENDECTOMY     • CATARACT EXTRACTION W/  INTRAOCULAR LENS IMPLANT  04/25/2008    Left eye   • CATARACT EXTRACTION W/  INTRAOCULAR LENS IMPLANT  11/17/2006    Right eye   • EXCISION OF LINGUAL TONSIL     • ORIF WRIST FRACTURE      Left wrist   • SHOULDER ARTHROSCOPY W/ ROTATOR CUFF REPAIR  6.27.13    Acromioplasty, DCR, Biceps Tenotomy. (Dr. 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 45 tablet 3   • docusate sodium (COLACE) 50 MG capsule Take 2 capsules by mouth daily. AM 60 capsule 11   • quinapril (ACCUPRIL) 10 MG tablet Take 1 tablet by mouth nightly. 90 tablet 2   • rabeprazole (ACIPHEX) 20 MG tablet TAKE 1 TABLET DAILY 90 tablet 3   • diphenhydrAMINE (BENADRYL) 25 MG tablet Take 1 tablet by mouth nightly. 30 tablet 0   • HYDROcodone-acetaminophen (NORCO) 5-325 MG per tablet Take 1 tablet by mouth every 6 hours as needed for Pain.     • acetaminophen (TYLENOL) 500 MG tablet Take 1 tablet by mouth every 8 hours. For pain relief. 100 tablet 5   • fentaNYL (DURAGESIC) 12 mcg/hr patch Place 1 patch onto the skin every 72 hours.     • bisacodyl (LAXATIVE FEMININE) 5 MG EC tablet Take 5 mg by mouth 2 times daily.     • aspirin 81 MG tablet Take 81 mg by mouth daily.     • 
Cholecalciferol (VITAMIN D3) 2000 UNITS TABS Take 1 tablet by mouth daily.       No current facility-administered medications for this visit.        ALLERGIES:   Allergen Reactions   • Fosamax [Alendronate Sodium]      Chest pain     • Miacalcin    • Nexium GI UPSET     Severe stomach pain        Social History     Social History   • Marital status:      Spouse name: N/A   • Number of children: N/A   • Years of education: N/A     Social History Main Topics   • Smoking status: Never Smoker   • Smokeless tobacco: Never Used   • Alcohol use No   • Drug use: No   • Sexual activity: Not Asked     Other Topics Concern   • Seat Belt Yes     Social History Narrative   • None        Family History   Problem Relation Age of Onset   • Diabetes Mother    • Diabetes Brother    • Aneurysm Brother    • Breast cancer Brother    • Cancer Son      Pancreatic cancer        OBJECTIVE:  Vital Signs:   Visit Vitals  /84   Pulse 79   Temp 98.4 °F (36.9 °C) (Temporal Artery)   Resp 16   Ht 5' 2\" (1.575 m)   Wt 52.1 kg   SpO2 96%   BMI 21.01 kg/m²     General: Patient appears well. No acute distress noted  HEENT: Conjunctivae are neither pale nor injected. Mucous membranes are moist. Nares are clear.   Lungs: Clear to auscultation bilaterally without crackles or wheezes. Respirations are even and unlabored.  Heart: Regular rate and rhythm   Skin: Warm and dry without rashes.  Extremities: No cyanosis, clubbing, or edema.  Neurologic: Alert and oriented times 3.   ASSESSMENT AND PLAN:    Venus was seen today for follow-up.    Diagnoses and all orders for this visit:    Dizziness, nonspecific    Other orders  -     sertraline (ZOLOFT) 25 MG tablet; Take 0.5 tablets by mouth daily.  -     docusate sodium (COLACE) 50 MG capsule; Take 2 capsules by mouth daily. AM       I did discuss the findings with the patient. Over 50% of this 25 minute spent in discussing her current signs and symptoms, current medication list, other potential 
causes of dizziness. We will continue with her medications as directed except we will decrease the.she says sodium as above. We will hold at this time on referral to neurology services however we have discussed that in the past and they do know that they can change their mind if need be. She will follow-up in December as previously scheduled. All the questions are answered. Understanding is stated.  
Medicine
Dr Mg

## 2025-05-13 NOTE — PROGRESS NOTE ADULT - ASSESSMENT
74 y.o. Male w/ hx  DM, HTN, CAD s/p stent (>1 year ago) and CABG (>5 years ago), Aortic bioprosthetic valve, BPH, HLD, who presents with worsening unstable angina x3-4 days.

## 2025-05-14 LAB
GLUCOSE BLDC GLUCOMTR-MCNC: 130 MG/DL — HIGH (ref 70–99)
GLUCOSE BLDC GLUCOMTR-MCNC: 160 MG/DL — HIGH (ref 70–99)
GLUCOSE BLDC GLUCOMTR-MCNC: 183 MG/DL — HIGH (ref 70–99)
GLUCOSE BLDC GLUCOMTR-MCNC: 236 MG/DL — HIGH (ref 70–99)

## 2025-05-14 PROCEDURE — 99233 SBSQ HOSP IP/OBS HIGH 50: CPT

## 2025-05-14 PROCEDURE — 99232 SBSQ HOSP IP/OBS MODERATE 35: CPT

## 2025-05-14 RX ORDER — ORAL SEMAGLUTIDE 14 MG/1
0.25 TABLET ORAL
Qty: 4 | Refills: 0
Start: 2025-05-14 | End: 2025-06-12

## 2025-05-14 RX ORDER — INSULIN LISPRO 100 U/ML
5 INJECTION, SOLUTION INTRAVENOUS; SUBCUTANEOUS
Refills: 0 | Status: DISCONTINUED | OUTPATIENT
Start: 2025-05-14 | End: 2025-05-15

## 2025-05-14 RX ORDER — ASPIRIN 325 MG
650 TABLET ORAL EVERY 8 HOURS
Refills: 0 | Status: DISCONTINUED | OUTPATIENT
Start: 2025-05-14 | End: 2025-05-15

## 2025-05-14 RX ORDER — INSULIN GLARGINE-YFGN 100 [IU]/ML
15 INJECTION, SOLUTION SUBCUTANEOUS AT BEDTIME
Refills: 0 | Status: DISCONTINUED | OUTPATIENT
Start: 2025-05-14 | End: 2025-05-15

## 2025-05-14 RX ORDER — COLCHICINE 0.6 MG/1
0.6 TABLET, FILM COATED ORAL
Refills: 0 | Status: DISCONTINUED | OUTPATIENT
Start: 2025-05-14 | End: 2025-05-15

## 2025-05-14 RX ADMIN — METOPROLOL SUCCINATE 25 MILLIGRAM(S): 50 TABLET, EXTENDED RELEASE ORAL at 17:57

## 2025-05-14 RX ADMIN — COLCHICINE 0.6 MILLIGRAM(S): 0.6 TABLET, FILM COATED ORAL at 17:57

## 2025-05-14 RX ADMIN — METOPROLOL SUCCINATE 25 MILLIGRAM(S): 50 TABLET, EXTENDED RELEASE ORAL at 05:29

## 2025-05-14 RX ADMIN — INSULIN LISPRO 4 UNIT(S): 100 INJECTION, SOLUTION INTRAVENOUS; SUBCUTANEOUS at 08:49

## 2025-05-14 RX ADMIN — INSULIN LISPRO 5 UNIT(S): 100 INJECTION, SOLUTION INTRAVENOUS; SUBCUTANEOUS at 17:58

## 2025-05-14 RX ADMIN — INSULIN LISPRO 2: 100 INJECTION, SOLUTION INTRAVENOUS; SUBCUTANEOUS at 08:49

## 2025-05-14 RX ADMIN — HEPARIN SODIUM 5000 UNIT(S): 1000 INJECTION INTRAVENOUS; SUBCUTANEOUS at 05:29

## 2025-05-14 RX ADMIN — INSULIN LISPRO 4 UNIT(S): 100 INJECTION, SOLUTION INTRAVENOUS; SUBCUTANEOUS at 12:22

## 2025-05-14 RX ADMIN — INSULIN GLARGINE-YFGN 15 UNIT(S): 100 INJECTION, SOLUTION SUBCUTANEOUS at 22:24

## 2025-05-14 RX ADMIN — HEPARIN SODIUM 5000 UNIT(S): 1000 INJECTION INTRAVENOUS; SUBCUTANEOUS at 21:58

## 2025-05-14 RX ADMIN — LISINOPRIL 5 MILLIGRAM(S): 5 TABLET ORAL at 05:29

## 2025-05-14 RX ADMIN — HEPARIN SODIUM 5000 UNIT(S): 1000 INJECTION INTRAVENOUS; SUBCUTANEOUS at 14:42

## 2025-05-14 RX ADMIN — Medication 650 MILLIGRAM(S): at 21:58

## 2025-05-14 RX ADMIN — ATORVASTATIN CALCIUM 40 MILLIGRAM(S): 80 TABLET, FILM COATED ORAL at 21:58

## 2025-05-14 RX ADMIN — INSULIN LISPRO 1: 100 INJECTION, SOLUTION INTRAVENOUS; SUBCUTANEOUS at 12:21

## 2025-05-14 RX ADMIN — Medication 81 MILLIGRAM(S): at 14:42

## 2025-05-14 NOTE — DIETITIAN INITIAL EVALUATION ADULT - PERTINENT LABORATORY DATA
05-13    133[L]  |  100  |  16  ----------------------------<  195[H]  3.9   |  19[L]  |  0.79    Ca    9.0      13 May 2025 04:45  Phos  2.6     05-13  Mg     2.10     05-13    POCT Blood Glucose.: 236 mg/dL (05-14-25 @ 08:31)  A1C with Estimated Average Glucose Result: 10.0 % (05-11-25 @ 04:53)

## 2025-05-14 NOTE — PROGRESS NOTE ADULT - SUBJECTIVE AND OBJECTIVE BOX
Patient is a 74y old  Male who presents with a chief complaint of Chest pain (14 May 2025 10:45)      SUBJECTIVE / OVERNIGHT EVENTS:  Patient has no new complaints. Denies cp, SOB, abdominal pain, N/V/D     MEDICATIONS  (STANDING):  aspirin enteric coated 81 milliGRAM(s) Oral daily  atorvastatin 40 milliGRAM(s) Oral at bedtime  dextrose 5%. 1000 milliLiter(s) (100 mL/Hr) IV Continuous <Continuous>  dextrose 5%. 1000 milliLiter(s) (50 mL/Hr) IV Continuous <Continuous>  dextrose 50% Injectable 25 Gram(s) IV Push once  dextrose 50% Injectable 12.5 Gram(s) IV Push once  dextrose 50% Injectable 25 Gram(s) IV Push once  glucagon  Injectable 1 milliGRAM(s) IntraMuscular once  heparin   Injectable 5000 Unit(s) SubCutaneous every 8 hours  insulin glargine Injectable (LANTUS) 12 Unit(s) SubCutaneous at bedtime  insulin lispro (ADMELOG) corrective regimen sliding scale   SubCutaneous three times a day before meals  insulin lispro (ADMELOG) corrective regimen sliding scale   SubCutaneous at bedtime  insulin lispro Injectable (ADMELOG) 4 Unit(s) SubCutaneous three times a day before meals  lisinopril 5 milliGRAM(s) Oral daily  metoprolol tartrate 25 milliGRAM(s) Oral two times a day    MEDICATIONS  (PRN):  acetaminophen     Tablet .. 650 milliGRAM(s) Oral every 6 hours PRN Temp greater or equal to 38C (100.4F), Mild Pain (1 - 3)  aluminum hydroxide/magnesium hydroxide/simethicone Suspension 30 milliLiter(s) Oral every 4 hours PRN Dyspepsia  dextrose Oral Gel 15 Gram(s) Oral once PRN Blood Glucose LESS THAN 70 milliGRAM(s)/deciliter  melatonin 3 milliGRAM(s) Oral at bedtime PRN Insomnia  ondansetron Injectable 4 milliGRAM(s) IV Push every 8 hours PRN Nausea and/or Vomiting      Vital Signs Last 24 Hrs  T(C): 36.7 (14 May 2025 05:19), Max: 36.9 (13 May 2025 21:29)  T(F): 98.1 (14 May 2025 05:19), Max: 98.4 (13 May 2025 21:29)  HR: 67 (14 May 2025 05:19) (66 - 70)  BP: 178/98 (14 May 2025 06:18) (143/73 - 195/97)  BP(mean): --  RR: 18 (14 May 2025 05:19) (17 - 18)  SpO2: 99% (14 May 2025 05:19) (97% - 99%)    Parameters below as of 14 May 2025 05:19  Patient On (Oxygen Delivery Method): room air      CAPILLARY BLOOD GLUCOSE      POCT Blood Glucose.: 183 mg/dL (14 May 2025 12:08)  POCT Blood Glucose.: 236 mg/dL (14 May 2025 08:31)  POCT Blood Glucose.: 238 mg/dL (13 May 2025 22:41)  POCT Blood Glucose.: 208 mg/dL (13 May 2025 17:18)    I&O's Summary      PHYSICAL EXAM:  GENERAL: NAD, well-developed  HEAD:  Atraumatic, Normocephalic  EYES: EOMI, PERRLA, conjunctiva and sclera clear  NECK: Supple, No JVD  CHEST/LUNG: Clear to auscultation bilaterally; No wheeze  HEART: Regular rate and rhythm; No murmurs, rubs, or gallops  ABDOMEN: Soft, Nontender, Nondistended; Bowel sounds present  EXTREMITIES:  2+ Peripheral Pulses, No clubbing, cyanosis, or edema  PSYCH: AAOx3  NEUROLOGY: non-focal  SKIN: No rashes or lesions    LABS:                        16.9   9.42  )-----------( 306      ( 13 May 2025 04:45 )             48.2     05-13    133[L]  |  100  |  16  ----------------------------<  195[H]  3.9   |  19[L]  |  0.79    Ca    9.0      13 May 2025 04:45  Phos  2.6     05-13  Mg     2.10     05-13            Urinalysis Basic - ( 13 May 2025 04:45 )    Color: x / Appearance: x / SG: x / pH: x  Gluc: 195 mg/dL / Ketone: x  / Bili: x / Urobili: x   Blood: x / Protein: x / Nitrite: x   Leuk Esterase: x / RBC: x / WBC x   Sq Epi: x / Non Sq Epi: x / Bacteria: x        RADIOLOGY & ADDITIONAL TESTS:    Imaging Personally Reviewed:    Consultant(s) Notes Reviewed:      Care Discussed with Consultants/Other Providers:

## 2025-05-14 NOTE — DIETITIAN INITIAL EVALUATION ADULT - ORAL INTAKE PTA/DIET HISTORY
Patient reports his appetite has been good in general, consumes three meals daily. Pt reported his recent weight 243lbs obtained last month, current weight 240.1 lbs (5/1) obtained at this hospital admission. Pt reported his weight loss is unintentional. Pt reported was taking metformin a year ago, and he stopped taking afterward, and has been following diet management since then. Pt endorses over the past 2 months, he has been stressed out with his life and lost track of his diet. Pt checks his FS reported his FS result has been around 200s.

## 2025-05-14 NOTE — DIETITIAN INITIAL EVALUATION ADULT - NS FNS WEIGHT CHANGE REASON
Pt reported Ht 5'10. Pt reported recent weight 243 obtained , most recent adm weight 240.1lbs (5/11). Weight loss likely due to hyperglycemia, A1C 10%./unintentional

## 2025-05-14 NOTE — PROGRESS NOTE ADULT - SUBJECTIVE AND OBJECTIVE BOX
Cardiology Progress Note  ------------------------------------------------------------------------------------------  SUBJECTIVE:   No events overnight. Denies CP, SOB or Palpitations. Stress images performed yesterday, pending rest images today.   -------------------------------------------------------------------------------------------  ROS:  CV: chest pain (-), palpitation (-), orthopnea (-), PND (-), edema (-)  PULM: SOB (-), cough (-), wheezing (-), hemoptysis (-).   CONST: fever (-), chills (-) or fatigue (-)  GI: abdominal distension (-), abdominal pain (-) , nausea/vomiting (-), hematemesis, (-), melena (-), hematochezia (-)  : dysuria (-), frequency (-), hematuria (-).   NEURO: numbness (-), weakness (-), dizziness (-)  MSK: myalgia (-), joint pain (-)   SKIN: itching (-), rash (-)  HEENT:  visual changes (-); vertigo or throat pain (-);  neck stiffness (-)   Psych: change in mood (-), anxiety (-), depression (-)     All other review of systems is negative unless indicated above.   -------------------------------------------------------------------------------------------  VS:  T(F): 98.1 (05-14), Max: 98.4 (05-13)  HR: 67 (05-14) (66 - 70)  BP: 178/98 (05-14) (143/73 - 195/97)  RR: 18 (05-14)  SpO2: 99% (05-14)  I&O's Summary    ------------------------------------------------------------------------------------------  PHYSICAL EXAM:    GENERAL: NAD  HEAD: Atraumatic, Normocephalic.  ENT: EOMI, Moist mucous membranes.  NECK: Supple  CHEST/LUNG: Clear to auscultation bilaterally; No rales, rhonchi, wheezing, or rubs. Unlabored respirations; area of CP on L lower chest tender to palpation  HEART: Regular rate and rhythm; No murmurs, rubs, or gallops.  EXTREMITIES: extremities warm, well perfused. No noted LE edema.   -------------------------------------------------------------------------------------------  LABS:  05-13    133[L]  |  100  |  16  ----------------------------<  195[H]  3.9   |  19[L]  |  0.79    Ca    9.0      13 May 2025 04:45  Phos  2.6     05-13  Mg     2.10     05-13      Creatinine Trend: 0.79<--, 0.77<--, 0.75<--, 0.77<--                        16.9   9.42  )-----------( 306      ( 13 May 2025 04:45 )             48.2         Lipid Panel: T(F): 98.1 (05-14), Max: 98.4 (05-13)  HR: 67 (05-14) (66 - 70)  BP: 178/98 (05-14) (143/73 - 195/97)  RR: 18 (05-14)  SpO2: 99% (05-14)  Cardiac Enzymes:         -------------------------------------------------------------------------------------------  Meds:  acetaminophen     Tablet .. 650 milliGRAM(s) Oral every 6 hours PRN  aluminum hydroxide/magnesium hydroxide/simethicone Suspension 30 milliLiter(s) Oral every 4 hours PRN  aspirin enteric coated 81 milliGRAM(s) Oral daily  atorvastatin 40 milliGRAM(s) Oral at bedtime  dextrose 5%. 1000 milliLiter(s) IV Continuous <Continuous>  dextrose 5%. 1000 milliLiter(s) IV Continuous <Continuous>  dextrose 50% Injectable 25 Gram(s) IV Push once  dextrose 50% Injectable 12.5 Gram(s) IV Push once  dextrose 50% Injectable 25 Gram(s) IV Push once  dextrose Oral Gel 15 Gram(s) Oral once PRN  glucagon  Injectable 1 milliGRAM(s) IntraMuscular once  heparin   Injectable 5000 Unit(s) SubCutaneous every 8 hours  insulin glargine Injectable (LANTUS) 12 Unit(s) SubCutaneous at bedtime  insulin lispro (ADMELOG) corrective regimen sliding scale   SubCutaneous three times a day before meals  insulin lispro (ADMELOG) corrective regimen sliding scale   SubCutaneous at bedtime  insulin lispro Injectable (ADMELOG) 4 Unit(s) SubCutaneous three times a day before meals  lisinopril 5 milliGRAM(s) Oral daily  melatonin 3 milliGRAM(s) Oral at bedtime PRN  metoprolol tartrate 25 milliGRAM(s) Oral two times a day  ondansetron Injectable 4 milliGRAM(s) IV Push every 8 hours PRN    -------------------------------------------------------------------------------------------  Cardiovascular Diagnostic Testing:  -------------------------------------------------------------------------------------------  ECG: NSR, 1st degree AVB, LAFB    Echo:   TTE 5/12/25     CONCLUSIONS:      1. Left ventricular cavity is normal in size. Left ventricular wall thickness is normal. Left ventricular systolic function is normal with an ejection fraction of 62 % by Linares's method of disks. There are no regional wall motion abnormalities seen.   2. There is mild (grade 1) left ventricular diastolic dysfunction.   3. Normal right ventricular cavity size and probably normal right ventricular systolic function. Tricuspid annular plane systolic excursion (TAPSE) is 2.1 cm (normal >=1.7 cm).   4. Structurally normal mitral valve with normal leaflet excursion. There is calcification of the mitral valve annulus. There is trace mitral regurgitation.   5. A bioprosthetic valve replacement is present in the aortic position. The peak transaortic velocity is 1.88 m/s, peak transaortic gradient is 14.1 mmHg and mean transaortic gradient is 7.0 mmHg with an LVOT/aortic valveVTI ratio of 0.54. There is no intravalvular regurgitation.      CXR:  reviewed  -------------------------------------------------------------------------------------------  Assessment and Plan:   -------------------------------------------------------------------------------------------  Mr. Batres is a 74 year old man with PMHx DM, HTN, CAD s/p stent (>1 year ago) and CABG and AVR forr biscuspid aortic valve (>5 years ago), BPH, HLD, who presents with intermediate-risk chest pain. Serial negative hs-Troponins have ruled-out ACS. TTE with pEF, grade I DD, s/p bio-AVR with no significant valvular pathology. Now pending NM stress test.     Problems Assessed:   #Chest pain  #CAD s/p PCI and CABG  #AS s/p AVR    Plan:   - Chest pain atypical, reproducible and at time positionally related. Serial hsTrops WNL.   - TTE with pEF, grade I DD, s/p bio-AVR with no significant valvular pathology  - Given high risk features of initial presentation, undergoing stress testing. Stress images yesterday, rest images today. Will follow up results    ------------------------------------------------------------------------------------------  Jose Luis Patino MD   of Cardiology  [facility] [employer] [facility]  [address], located within [facility], [address]  [place] [address]  Phone: [phone]  Fax: [phone]  Please check [email or web address] for cardiology service schedule and contact information.   ------------------------------------------------------------------------------------------  Billing Statement:   76/56/51/36 minutes spent on total encounter. Necessity of time spent during this encounter on this date of service was due to review of medical information in EMR, co-ordination of hospital and outpatient care, discussion with patient and communication with primary team.   -------------------------------------------------------------------------------------------

## 2025-05-14 NOTE — DIETITIAN INITIAL EVALUATION ADULT - NS FNS DIET ORDER
Diet, Regular:   Consistent Carbohydrate {No Snacks} (CSTCHO)  DASH/TLC {Sodium & Cholesterol Restricted} (DASH) (05-13-25 @ 16:17)

## 2025-05-14 NOTE — DIETITIAN INITIAL EVALUATION ADULT - PERTINENT MEDS FT
MEDICATIONS  (STANDING):  aspirin enteric coated 81 milliGRAM(s) Oral daily  atorvastatin 40 milliGRAM(s) Oral at bedtime  dextrose 5%. 1000 milliLiter(s) (100 mL/Hr) IV Continuous <Continuous>  dextrose 5%. 1000 milliLiter(s) (50 mL/Hr) IV Continuous <Continuous>  dextrose 50% Injectable 25 Gram(s) IV Push once  dextrose 50% Injectable 12.5 Gram(s) IV Push once  dextrose 50% Injectable 25 Gram(s) IV Push once  glucagon  Injectable 1 milliGRAM(s) IntraMuscular once  heparin   Injectable 5000 Unit(s) SubCutaneous every 8 hours  insulin glargine Injectable (LANTUS) 12 Unit(s) SubCutaneous at bedtime  insulin lispro (ADMELOG) corrective regimen sliding scale   SubCutaneous three times a day before meals  insulin lispro (ADMELOG) corrective regimen sliding scale   SubCutaneous at bedtime  insulin lispro Injectable (ADMELOG) 4 Unit(s) SubCutaneous three times a day before meals  lisinopril 5 milliGRAM(s) Oral daily  metoprolol tartrate 25 milliGRAM(s) Oral two times a day    MEDICATIONS  (PRN):  acetaminophen     Tablet .. 650 milliGRAM(s) Oral every 6 hours PRN Temp greater or equal to 38C (100.4F), Mild Pain (1 - 3)  aluminum hydroxide/magnesium hydroxide/simethicone Suspension 30 milliLiter(s) Oral every 4 hours PRN Dyspepsia  dextrose Oral Gel 15 Gram(s) Oral once PRN Blood Glucose LESS THAN 70 milliGRAM(s)/deciliter  melatonin 3 milliGRAM(s) Oral at bedtime PRN Insomnia  ondansetron Injectable 4 milliGRAM(s) IV Push every 8 hours PRN Nausea and/or Vomiting

## 2025-05-14 NOTE — DIETITIAN INITIAL EVALUATION ADULT - PROBLEM SELECTOR PLAN 1
Significant underlying cardiac history including stent (>1 year ago) and CABG (>5 years ago)  Currently not taking any medications  Pain worse at rest  Minimal troponin elevation, 12 to 14  EKG unchanged per ED  - Cardio consult  - Nitro prn  - Metoprolol, lipitor, lisinopril started  - TTE

## 2025-05-14 NOTE — PROGRESS NOTE ADULT - PROBLEM SELECTOR PLAN 1
Significant underlying cardiac history including stent (>1 year ago) and CABG (>5 years ago)  Currently not taking any medications  Pain worse at rest  Minimal troponin elevation, 12 to 14  EKG unchanged per ED  - Cardio consult appreciated  - Nitro prn  - Metoprolol, lipitor, lisinopril started  - TTE negative for acute changes  -nuclear stress test on 5/13, and 5/14 showed fixed lateral wall defect suggestive of infarct  -f/U cardiology recs

## 2025-05-14 NOTE — CHART NOTE - NSCHARTNOTEFT_GEN_A_CORE
74y year old Male  with  T2DM with hyperglycemia, and CAD admitted with chest pain.     Attempted to see pt. Pt was off unit at Nuclear stress test at time of visit     1)Type 2 Diabetes Mellitus  Home regimen: no recent meds, previously on metformin  HbA1c: 10.0 above goal     While inpatient:   - Inpatient glucose target 100-180: above goal   - Increase Lantus to 18 units sq qhs   - Increase Admelog to 6 units TID AC (please hold if npo/not eating)  - Continue Admelog low correction scale TID AC and separate Admelog low correction scale at bedtime   - FS before meals and at bedtime   - Consistent carb diet   - RD consult (ordered)   - Hypoglycemia Protocol       Discharge planning:   Likely discharge on metformin 500 mg bid with meals x 7 days if tolerating increase metformin 1000mg p.o. BID with meals and Ozempic 0.25 mg weekly.     Please check if ozempic 0.25mg sq daily to check for insurance coverage     Pt will need education in use of ozempic pen.    Ensure patient has working glucometer, test strips, lancets, alcohol pads, and BD kimmy pen needles; will need glucometer teaching prior to dc     For severe hypoglycemia: Please prescribe Glucose tablets 4G (take 4 tablets) or 15G tablets for blood sugar less than 70 mG/dL repeat fingerstick in 15 minutes.    Please follow up with opthalmology and podiatry as an outpt     Pt should follow up with Endocrinology post discharge: [phone]    2)Hypertension  - continue metoprolol, lisinopril  - please obtain urine micro albumin cr ratio as an outpt   - Defer to primary team       3)Hyperlipidemia  - continue atorvastatin if no contraindications  - LDL goal <70   - Defer to primary team       MEDICATIONS  (STANDING):  aspirin enteric coated 81 milliGRAM(s) Oral daily  atorvastatin 40 milliGRAM(s) Oral at bedtime  dextrose 5%. 1000 milliLiter(s) (100 mL/Hr) IV Continuous <Continuous>  dextrose 5%. 1000 milliLiter(s) (50 mL/Hr) IV Continuous <Continuous>  dextrose 50% Injectable 25 Gram(s) IV Push once  dextrose 50% Injectable 12.5 Gram(s) IV Push once  dextrose 50% Injectable 25 Gram(s) IV Push once  glucagon  Injectable 1 milliGRAM(s) IntraMuscular once  heparin   Injectable 5000 Unit(s) SubCutaneous every 8 hours  insulin glargine Injectable (LANTUS) 12 Unit(s) SubCutaneous at bedtime  insulin lispro (ADMELOG) corrective regimen sliding scale   SubCutaneous three times a day before meals  insulin lispro (ADMELOG) corrective regimen sliding scale   SubCutaneous at bedtime  insulin lispro Injectable (ADMELOG) 4 Unit(s) SubCutaneous three times a day before meals  lisinopril 5 milliGRAM(s) Oral daily  metoprolol tartrate 25 milliGRAM(s) Oral two times a day    MEDICATIONS  (PRN):  acetaminophen     Tablet .. 650 milliGRAM(s) Oral every 6 hours PRN Temp greater or equal to 38C (100.4F), Mild Pain (1 - 3)  aluminum hydroxide/magnesium hydroxide/simethicone Suspension 30 milliLiter(s) Oral every 4 hours PRN Dyspepsia  dextrose Oral Gel 15 Gram(s) Oral once PRN Blood Glucose LESS THAN 70 milliGRAM(s)/deciliter  melatonin 3 milliGRAM(s) Oral at bedtime PRN Insomnia  ondansetron Injectable 4 milliGRAM(s) IV Push every 8 hours PRN Nausea and/or Vomiting      Allergies: No Known Allergies      PHYSICAL EXAM:  VITALS: T(C): 36.4 (05-14-25 @ 12:45)  T(F): 97.5 (05-14-25 @ 12:45), Max: 98.4 (05-13-25 @ 21:29)  HR: 65 (05-14-25 @ 12:45) (65 - 70)  BP: 157/76 (05-14-25 @ 12:45) (143/73 - 195/97)  RR:  (17 - 18)  SpO2:  (97% - 99%)      CAPILLARY BLOOD GLUCOSE  POCT Blood Glucose.: 183 mg/dL (14 May 2025 12:08)  POCT Blood Glucose.: 236 mg/dL (14 May 2025 08:31)  POCT Blood Glucose.: 238 mg/dL (13 May 2025 22:41)  POCT Blood Glucose.: 208 mg/dL (13 May 2025 17:18)    A1C with Estimated Average Glucose (05.11.25 @ 04:53)    A1C with Estimated Average Glucose Result: 10.0    Estimated Average Glucose: 240    05-13    133[L]  |  100  |  16  ----------------------------<  195[H]  3.9   |  19[L]  |  0.79    eGFR: 94    Ca    9.0      05-13  Mg     2.10     05-13  Phos  2.6     05-13    TPro  6.6  /  Alb  3.5  /  TBili  0.6  /  DBili  x   /  AST  18  /  ALT  18  /  AlkPhos  89  05-12    Diet, Regular:   Consistent Carbohydrate {No Snacks} (CSTCHO)  DASH/TLC {Sodium & Cholesterol Restricted} (DASH) (05-13-25 @ 16:17) [Active]    D/w Mariel Hernandez  Nurse Practitioner  Division of Endocrinology & Diabetes  Available [employer] Teams    If before 9AM or after 6PM, or on weekends/holidays, please call endocrine answering service for assistance ([phone]).For nonurgent matters email [email or web address] for assistance. 74y year old Male  with  T2DM with hyperglycemia, and CAD admitted with chest pain.     Attempted to see pt. Pt was off unit at Nuclear stress test at time of visit     1)Type 2 Diabetes Mellitus  Home regimen: no recent meds, previously on metformin  HbA1c: 10.0 above goal     While inpatient:   - Inpatient glucose target 100-180: above goal   - Increase Lantus to 18 units sq qhs   - Increase Admelog to 6 units TID AC (please hold if npo/not eating)  - Continue Admelog low correction scale TID AC and separate Admelog low correction scale at bedtime   - FS before meals and at bedtime   - Consistent carb diet   - RD consult: completed   - Hypoglycemia Protocol       Discharge planning:   Likely discharge on metformin 500 mg bid with meals x 7 days if tolerating increase metformin 1000mg p.o. BID with meals and Ozempic 0.25 mg weekly.     Please check if ozempic 0.25mg sq daily to check for insurance coverage     Pt will need education in use of ozempic pen.    Ensure patient has working glucometer, test strips, lancets, alcohol pads, and BD kimmy pen needles; will need glucometer teaching prior to dc     For severe hypoglycemia: Please prescribe Glucose tablets 4G (take 4 tablets) or 15G tablets for blood sugar less than 70 mG/dL repeat fingerstick in 15 minutes.    Please follow up with opthalmology and podiatry as an outpt     Pt should follow up with Endocrinology post discharge: [phone]    2)Hypertension  - continue metoprolol, lisinopril  - please obtain urine micro albumin cr ratio as an outpt   - Defer to primary team       3)Hyperlipidemia  - continue atorvastatin if no contraindications  - LDL goal <70   - Defer to primary team       MEDICATIONS  (STANDING):  aspirin enteric coated 81 milliGRAM(s) Oral daily  atorvastatin 40 milliGRAM(s) Oral at bedtime  dextrose 5%. 1000 milliLiter(s) (100 mL/Hr) IV Continuous <Continuous>  dextrose 5%. 1000 milliLiter(s) (50 mL/Hr) IV Continuous <Continuous>  dextrose 50% Injectable 25 Gram(s) IV Push once  dextrose 50% Injectable 12.5 Gram(s) IV Push once  dextrose 50% Injectable 25 Gram(s) IV Push once  glucagon  Injectable 1 milliGRAM(s) IntraMuscular once  heparin   Injectable 5000 Unit(s) SubCutaneous every 8 hours  insulin glargine Injectable (LANTUS) 12 Unit(s) SubCutaneous at bedtime  insulin lispro (ADMELOG) corrective regimen sliding scale   SubCutaneous three times a day before meals  insulin lispro (ADMELOG) corrective regimen sliding scale   SubCutaneous at bedtime  insulin lispro Injectable (ADMELOG) 4 Unit(s) SubCutaneous three times a day before meals  lisinopril 5 milliGRAM(s) Oral daily  metoprolol tartrate 25 milliGRAM(s) Oral two times a day    MEDICATIONS  (PRN):  acetaminophen     Tablet .. 650 milliGRAM(s) Oral every 6 hours PRN Temp greater or equal to 38C (100.4F), Mild Pain (1 - 3)  aluminum hydroxide/magnesium hydroxide/simethicone Suspension 30 milliLiter(s) Oral every 4 hours PRN Dyspepsia  dextrose Oral Gel 15 Gram(s) Oral once PRN Blood Glucose LESS THAN 70 milliGRAM(s)/deciliter  melatonin 3 milliGRAM(s) Oral at bedtime PRN Insomnia  ondansetron Injectable 4 milliGRAM(s) IV Push every 8 hours PRN Nausea and/or Vomiting      Allergies: No Known Allergies      PHYSICAL EXAM:  VITALS: T(C): 36.4 (05-14-25 @ 12:45)  T(F): 97.5 (05-14-25 @ 12:45), Max: 98.4 (05-13-25 @ 21:29)  HR: 65 (05-14-25 @ 12:45) (65 - 70)  BP: 157/76 (05-14-25 @ 12:45) (143/73 - 195/97)  RR:  (17 - 18)  SpO2:  (97% - 99%)      CAPILLARY BLOOD GLUCOSE  POCT Blood Glucose.: 183 mg/dL (14 May 2025 12:08)  POCT Blood Glucose.: 236 mg/dL (14 May 2025 08:31)  POCT Blood Glucose.: 238 mg/dL (13 May 2025 22:41)  POCT Blood Glucose.: 208 mg/dL (13 May 2025 17:18)    A1C with Estimated Average Glucose (05.11.25 @ 04:53)    A1C with Estimated Average Glucose Result: 10.0    Estimated Average Glucose: 240    05-13    133[L]  |  100  |  16  ----------------------------<  195[H]  3.9   |  19[L]  |  0.79    eGFR: 94    Ca    9.0      05-13  Mg     2.10     05-13  Phos  2.6     05-13    TPro  6.6  /  Alb  3.5  /  TBili  0.6  /  DBili  x   /  AST  18  /  ALT  18  /  AlkPhos  89  05-12    Diet, Regular:   Consistent Carbohydrate {No Snacks} (CSTCHO)  DASH/TLC {Sodium & Cholesterol Restricted} (DASH) (05-13-25 @ 16:17) [Active]    D/w Mariel Hernandez  Nurse Practitioner  Division of Endocrinology & Diabetes  Available [employer] Teams    If before 9AM or after 6PM, or on weekends/holidays, please call endocrine answering service for assistance ([phone]).For nonurgent matters email [email or web address] for assistance. 74y year old Male  with  T2DM with hyperglycemia, and CAD admitted with chest pain.     Attempted to see pt. Pt was off unit at Nuclear stress test at time of visit     1)Type 2 Diabetes Mellitus  Home regimen: no recent meds, previously on metformin  HbA1c: 10.0 above goal     While inpatient:   - Inpatient glucose target 100-180: above goal   - Increase Lantus to 15 units sq qhs   - Increase Admelog to 5 units TID AC (please hold if npo/not eating)  - Continue Admelog low correction scale TID AC and separate Admelog low correction scale at bedtime   - FS before meals and at bedtime   - Consistent carb diet   - RD consult: completed   - Hypoglycemia Protocol       Discharge planning:   Likely discharge on metformin 500 mg bid with meals x 7 days if tolerating increase metformin 1000mg p.o. BID with meals and Ozempic 0.25 mg weekly.     Please check if ozempic 0.25mg sq daily to check for insurance coverage     Pt will need education in use of ozempic pen.    Ensure patient has working glucometer, test strips, lancets, alcohol pads, and BD kimmy pen needles; will need glucometer teaching prior to dc     For severe hypoglycemia: Please prescribe Glucose tablets 4G (take 4 tablets) or 15G tablets for blood sugar less than 70 mG/dL repeat fingerstick in 15 minutes.    Please follow up with opthalmology and podiatry as an outpt     Pt should follow up with Endocrinology post discharge: [phone]    2)Hypertension  - continue metoprolol, lisinopril  - please obtain urine micro albumin cr ratio as an outpt   - Defer to primary team       3)Hyperlipidemia  - continue atorvastatin if no contraindications  - LDL goal <70   - Defer to primary team       MEDICATIONS  (STANDING):  aspirin enteric coated 81 milliGRAM(s) Oral daily  atorvastatin 40 milliGRAM(s) Oral at bedtime  dextrose 5%. 1000 milliLiter(s) (100 mL/Hr) IV Continuous <Continuous>  dextrose 5%. 1000 milliLiter(s) (50 mL/Hr) IV Continuous <Continuous>  dextrose 50% Injectable 25 Gram(s) IV Push once  dextrose 50% Injectable 12.5 Gram(s) IV Push once  dextrose 50% Injectable 25 Gram(s) IV Push once  glucagon  Injectable 1 milliGRAM(s) IntraMuscular once  heparin   Injectable 5000 Unit(s) SubCutaneous every 8 hours  insulin glargine Injectable (LANTUS) 12 Unit(s) SubCutaneous at bedtime  insulin lispro (ADMELOG) corrective regimen sliding scale   SubCutaneous three times a day before meals  insulin lispro (ADMELOG) corrective regimen sliding scale   SubCutaneous at bedtime  insulin lispro Injectable (ADMELOG) 4 Unit(s) SubCutaneous three times a day before meals  lisinopril 5 milliGRAM(s) Oral daily  metoprolol tartrate 25 milliGRAM(s) Oral two times a day    MEDICATIONS  (PRN):  acetaminophen     Tablet .. 650 milliGRAM(s) Oral every 6 hours PRN Temp greater or equal to 38C (100.4F), Mild Pain (1 - 3)  aluminum hydroxide/magnesium hydroxide/simethicone Suspension 30 milliLiter(s) Oral every 4 hours PRN Dyspepsia  dextrose Oral Gel 15 Gram(s) Oral once PRN Blood Glucose LESS THAN 70 milliGRAM(s)/deciliter  melatonin 3 milliGRAM(s) Oral at bedtime PRN Insomnia  ondansetron Injectable 4 milliGRAM(s) IV Push every 8 hours PRN Nausea and/or Vomiting      Allergies: No Known Allergies      PHYSICAL EXAM:  VITALS: T(C): 36.4 (05-14-25 @ 12:45)  T(F): 97.5 (05-14-25 @ 12:45), Max: 98.4 (05-13-25 @ 21:29)  HR: 65 (05-14-25 @ 12:45) (65 - 70)  BP: 157/76 (05-14-25 @ 12:45) (143/73 - 195/97)  RR:  (17 - 18)  SpO2:  (97% - 99%)      CAPILLARY BLOOD GLUCOSE  POCT Blood Glucose.: 183 mg/dL (14 May 2025 12:08)  POCT Blood Glucose.: 236 mg/dL (14 May 2025 08:31)  POCT Blood Glucose.: 238 mg/dL (13 May 2025 22:41)  POCT Blood Glucose.: 208 mg/dL (13 May 2025 17:18)    A1C with Estimated Average Glucose (05.11.25 @ 04:53)    A1C with Estimated Average Glucose Result: 10.0    Estimated Average Glucose: 240    05-13    133[L]  |  100  |  16  ----------------------------<  195[H]  3.9   |  19[L]  |  0.79    eGFR: 94    Ca    9.0      05-13  Mg     2.10     05-13  Phos  2.6     05-13    TPro  6.6  /  Alb  3.5  /  TBili  0.6  /  DBili  x   /  AST  18  /  ALT  18  /  AlkPhos  89  05-12    Diet, Regular:   Consistent Carbohydrate {No Snacks} (CSTCHO)  DASH/TLC {Sodium & Cholesterol Restricted} (DASH) (05-13-25 @ 16:17) [Active]    D/w Mariel Hernandez  Nurse Practitioner  Division of Endocrinology & Diabetes  Available [employer] Teams    If before 9AM or after 6PM, or on weekends/holidays, please call endocrine answering service for assistance ([phone]).For nonurgent matters email [email or web address] for assistance.

## 2025-05-14 NOTE — DIETITIAN INITIAL EVALUATION ADULT - OTHER INFO
Per chart review, 74 y.o. Male w/ hx  DM, HTN, CAD s/p stent (>1 year ago) and CABG (>5 years ago), Aortic bioprosthetic valve, BPH, HLD, who presents with worsening unstable angina x3-4 days.     Patient seen at bedside. Pt reports his appetite continues to be good in hospital. Pt's diet initiated on regular, Consistent Carbohydrate diet, pt is tolerating well. Pt denies chewing and swallowing difficulties. Pt confirmed NFKA, however dislike seafoods. Denies any GI distress (nausea/vomiting/diarrhea/constipation.) LBM 5/12 as per RN flow sheet. Pt's labs notable with A1C 10.0% Endocrine is following. Insulin regimen ordered for glycemic management.   Pt previously following Low Carb diet PTA and knows how to read nutrition label as per pt. Provided pt with handout Carbohydrate Counting for People with Diabetes. Discussed carbohydrate sources, carbohydrate portions, protein sources, and mixed meals. Stressed importance of balanced meals with adequate protein and fiber. Pt was informed of current A1c, goal A1c, and goal fingerstick range. RD to remain available for further nutritional interventions as indicated.

## 2025-05-15 ENCOUNTER — TRANSCRIPTION ENCOUNTER (OUTPATIENT)
Age: 75
End: 2025-05-15

## 2025-05-15 VITALS
OXYGEN SATURATION: 98 % | HEART RATE: 61 BPM | SYSTOLIC BLOOD PRESSURE: 125 MMHG | TEMPERATURE: 98 F | RESPIRATION RATE: 17 BRPM | DIASTOLIC BLOOD PRESSURE: 85 MMHG

## 2025-05-15 LAB
ANION GAP SERPL CALC-SCNC: 11 MMOL/L — SIGNIFICANT CHANGE UP (ref 7–14)
BUN SERPL-MCNC: 17 MG/DL — SIGNIFICANT CHANGE UP (ref 7–23)
CALCIUM SERPL-MCNC: 9 MG/DL — SIGNIFICANT CHANGE UP (ref 8.4–10.5)
CHLORIDE SERPL-SCNC: 104 MMOL/L — SIGNIFICANT CHANGE UP (ref 98–107)
CO2 SERPL-SCNC: 20 MMOL/L — LOW (ref 22–31)
CREAT SERPL-MCNC: 0.86 MG/DL — SIGNIFICANT CHANGE UP (ref 0.5–1.3)
EGFR: 92 ML/MIN/1.73M2 — SIGNIFICANT CHANGE UP
EGFR: 92 ML/MIN/1.73M2 — SIGNIFICANT CHANGE UP
GLUCOSE BLDC GLUCOMTR-MCNC: 133 MG/DL — HIGH (ref 70–99)
GLUCOSE BLDC GLUCOMTR-MCNC: 167 MG/DL — HIGH (ref 70–99)
GLUCOSE SERPL-MCNC: 141 MG/DL — HIGH (ref 70–99)
HCT VFR BLD CALC: 46.8 % — SIGNIFICANT CHANGE UP (ref 39–50)
HGB BLD-MCNC: 16.4 G/DL — SIGNIFICANT CHANGE UP (ref 13–17)
MAGNESIUM SERPL-MCNC: 2.2 MG/DL — SIGNIFICANT CHANGE UP (ref 1.6–2.6)
MCHC RBC-ENTMCNC: 32.1 PG — SIGNIFICANT CHANGE UP (ref 27–34)
MCHC RBC-ENTMCNC: 35 G/DL — SIGNIFICANT CHANGE UP (ref 32–36)
MCV RBC AUTO: 91.6 FL — SIGNIFICANT CHANGE UP (ref 80–100)
NRBC # BLD AUTO: 0 K/UL — SIGNIFICANT CHANGE UP (ref 0–0)
NRBC # FLD: 0 K/UL — SIGNIFICANT CHANGE UP (ref 0–0)
NRBC BLD AUTO-RTO: 0 /100 WBCS — SIGNIFICANT CHANGE UP (ref 0–0)
PHOSPHATE SERPL-MCNC: 3.1 MG/DL — SIGNIFICANT CHANGE UP (ref 2.5–4.5)
PLATELET # BLD AUTO: 297 K/UL — SIGNIFICANT CHANGE UP (ref 150–400)
POTASSIUM SERPL-MCNC: 3.8 MMOL/L — SIGNIFICANT CHANGE UP (ref 3.5–5.3)
POTASSIUM SERPL-SCNC: 3.8 MMOL/L — SIGNIFICANT CHANGE UP (ref 3.5–5.3)
RBC # BLD: 5.11 M/UL — SIGNIFICANT CHANGE UP (ref 4.2–5.8)
RBC # FLD: 11.9 % — SIGNIFICANT CHANGE UP (ref 10.3–14.5)
SODIUM SERPL-SCNC: 135 MMOL/L — SIGNIFICANT CHANGE UP (ref 135–145)
WBC # BLD: 9.84 K/UL — SIGNIFICANT CHANGE UP (ref 3.8–10.5)
WBC # FLD AUTO: 9.84 K/UL — SIGNIFICANT CHANGE UP (ref 3.8–10.5)

## 2025-05-15 PROCEDURE — 99232 SBSQ HOSP IP/OBS MODERATE 35: CPT

## 2025-05-15 PROCEDURE — 99239 HOSP IP/OBS DSCHRG MGMT >30: CPT

## 2025-05-15 RX ORDER — METOPROLOL SUCCINATE 50 MG/1
1 TABLET, EXTENDED RELEASE ORAL
Qty: 60 | Refills: 0
Start: 2025-05-15 | End: 2025-06-13

## 2025-05-15 RX ORDER — ORAL SEMAGLUTIDE 14 MG/1
0.25 TABLET ORAL
Qty: 1 | Refills: 0
Start: 2025-05-15 | End: 2025-06-13

## 2025-05-15 RX ORDER — ASPIRIN 325 MG
1 TABLET ORAL
Qty: 30 | Refills: 0
Start: 2025-05-15 | End: 2025-06-13

## 2025-05-15 RX ORDER — ISOPROPYL ALCOHOL, BENZOCAINE .7; .06 ML/ML; ML/ML
0 SWAB TOPICAL
Qty: 100 | Refills: 1
Start: 2025-05-15

## 2025-05-15 RX ORDER — MELATONIN 5 MG
1 TABLET ORAL
Qty: 0 | Refills: 0 | DISCHARGE
Start: 2025-05-15

## 2025-05-15 RX ORDER — LISINOPRIL 5 MG/1
1 TABLET ORAL
Qty: 30 | Refills: 0
Start: 2025-05-15 | End: 2025-06-13

## 2025-05-15 RX ORDER — METFORMIN HYDROCHLORIDE 850 MG/1
2 TABLET ORAL
Qty: 120 | Refills: 0
Start: 2025-05-15 | End: 2025-06-13

## 2025-05-15 RX ORDER — ATORVASTATIN CALCIUM 80 MG/1
1 TABLET, FILM COATED ORAL
Qty: 30 | Refills: 0
Start: 2025-05-15 | End: 2025-06-13

## 2025-05-15 RX ORDER — DEXTROSE 50 % IN WATER 50 %
15 SYRINGE (ML) INTRAVENOUS
Qty: 14 | Refills: 0
Start: 2025-05-15 | End: 2025-06-13

## 2025-05-15 RX ADMIN — INSULIN LISPRO 5 UNIT(S): 100 INJECTION, SOLUTION INTRAVENOUS; SUBCUTANEOUS at 12:41

## 2025-05-15 RX ADMIN — INSULIN LISPRO 5 UNIT(S): 100 INJECTION, SOLUTION INTRAVENOUS; SUBCUTANEOUS at 09:00

## 2025-05-15 RX ADMIN — HEPARIN SODIUM 5000 UNIT(S): 1000 INJECTION INTRAVENOUS; SUBCUTANEOUS at 05:30

## 2025-05-15 RX ADMIN — METOPROLOL SUCCINATE 25 MILLIGRAM(S): 50 TABLET, EXTENDED RELEASE ORAL at 05:31

## 2025-05-15 RX ADMIN — COLCHICINE 0.6 MILLIGRAM(S): 0.6 TABLET, FILM COATED ORAL at 05:30

## 2025-05-15 RX ADMIN — INSULIN LISPRO 1: 100 INJECTION, SOLUTION INTRAVENOUS; SUBCUTANEOUS at 12:40

## 2025-05-15 RX ADMIN — LISINOPRIL 5 MILLIGRAM(S): 5 TABLET ORAL at 05:31

## 2025-05-15 RX ADMIN — Medication 650 MILLIGRAM(S): at 12:45

## 2025-05-15 RX ADMIN — Medication 650 MILLIGRAM(S): at 05:30

## 2025-05-15 RX ADMIN — HEPARIN SODIUM 5000 UNIT(S): 1000 INJECTION INTRAVENOUS; SUBCUTANEOUS at 12:44

## 2025-05-15 NOTE — PROGRESS NOTE ADULT - TIME BILLING
Reviewed lab data, radiology results, consultants' recommendations, documentation in [place], discussed with family, ACP, interdisciplinary staff and/or intervention were performed.
Reviewed lab data, radiology results, consultants' recommendations, documentation in [place], discussed with family, ACP, interdisciplinary staff and/or intervention were performed.
Reviewed lab data, radiology results, consultants' recommendations, documentation in [place], discussed with family, ACP, interdisciplinary staff and/or intervention were performed.
Reviewed lab data, radiology results, consultants' recommendations, documentation in [place], discussed with family, ACP, interdisciplinary staff and/or intervention were performed.

## 2025-05-15 NOTE — PROGRESS NOTE ADULT - PROBLEM SELECTOR PLAN 4
/91 at admission   Not on medications at home  - initiated metoprolol and lisinopril  -BPs better controlled
/91 at admission   Not on medications at home  - initiated metoprolol and lisinopril  -BPs better controlled
/91 at admission   Not on medications at home  - initiated metoprolol and lisinopril
/91 at admission   Not on medications at home  - initiated metoprolol and lisinopril  -BPs better controlled

## 2025-05-15 NOTE — PROGRESS NOTE ADULT - SUBJECTIVE AND OBJECTIVE BOX
Chief Complaint: Type 2 DM     History: Pt seen at bedside. Pt tolerating oral diet. Pt denies nausea and vomiting/any signs of hypoglycemia. Pt reports an adequate appetite.     MEDICATIONS  (STANDING):  aspirin 650 milliGRAM(s) Oral every 8 hours  atorvastatin 40 milliGRAM(s) Oral at bedtime  dextrose 5%. 1000 milliLiter(s) (50 mL/Hr) IV Continuous <Continuous>  dextrose 5%. 1000 milliLiter(s) (100 mL/Hr) IV Continuous <Continuous>  dextrose 50% Injectable 25 Gram(s) IV Push once  dextrose 50% Injectable 12.5 Gram(s) IV Push once  dextrose 50% Injectable 25 Gram(s) IV Push once  glucagon  Injectable 1 milliGRAM(s) IntraMuscular once  heparin   Injectable 5000 Unit(s) SubCutaneous every 8 hours  insulin glargine Injectable (LANTUS) 15 Unit(s) SubCutaneous at bedtime  insulin lispro (ADMELOG) corrective regimen sliding scale   SubCutaneous three times a day before meals  insulin lispro (ADMELOG) corrective regimen sliding scale   SubCutaneous at bedtime  insulin lispro Injectable (ADMELOG) 5 Unit(s) SubCutaneous three times a day before meals  lisinopril 5 milliGRAM(s) Oral daily  metoprolol tartrate 25 milliGRAM(s) Oral two times a day    MEDICATIONS  (PRN):  acetaminophen     Tablet .. 650 milliGRAM(s) Oral every 6 hours PRN Temp greater or equal to 38C (100.4F), Mild Pain (1 - 3)  aluminum hydroxide/magnesium hydroxide/simethicone Suspension 30 milliLiter(s) Oral every 4 hours PRN Dyspepsia  dextrose Oral Gel 15 Gram(s) Oral once PRN Blood Glucose LESS THAN 70 milliGRAM(s)/deciliter  melatonin 3 milliGRAM(s) Oral at bedtime PRN Insomnia  ondansetron Injectable 4 milliGRAM(s) IV Push every 8 hours PRN Nausea and/or Vomiting      Allergies: No Known Allergies    Review of Systems:  Respiratory: No SOB, no cough  GI: No nausea, vomiting, abdominal pain  Endocrine: no polyuria, polydipsia    PHYSICAL EXAM:  VITALS: T(C): 36.6 (05-15-25 @ 05:07)  T(F): 97.8 (05-15-25 @ 05:07), Max: 98.3 (05-14-25 @ 18:00)  HR: 63 (05-15-25 @ 05:07) (63 - 68)  BP: 117/81 (05-15-25 @ 05:07) (117/81 - 145/88)  RR:  (17 - 18)  SpO2:  (98% - 99%)  Wt(kg): --  GENERAL: NAD, well-groomed, well-developed  RESPIRATORY: No labored breathing   GI: Soft, nontender, non distended  PSYCH: Alert and oriented x 3, normal affect, normal mood      CAPILLARY BLOOD GLUCOSE  POCT Blood Glucose.: 167 mg/dL (15 May 2025 12:29)  POCT Blood Glucose.: 133 mg/dL (15 May 2025 08:22)  POCT Blood Glucose.: 160 mg/dL (14 May 2025 22:17)  POCT Blood Glucose.: 130 mg/dL (14 May 2025 17:44)    A1C with Estimated Average Glucose (05.11.25 @ 04:53)    A1C with Estimated Average Glucose Result: 10.0   Estimated Average Glucose: 240      05-15    135  |  104  |  17  ----------------------------<  141[H]  3.8   |  20[L]  |  0.86    eGFR: 92    Ca    9.0      05-15  Mg     2.20     05-15  Phos  3.1     05-15    Diet, Regular:   Consistent Carbohydrate No Snacks (CSTCHO)  DASH/TLC Sodium & Cholesterol Restricted (DASH) (05-13-25 @ 16:17) [Active]

## 2025-05-15 NOTE — PROGRESS NOTE ADULT - PROBLEM SELECTOR PLAN 3
A1c 10%  Not on medications at home  - FSG monitoring  -c/w lantus 10 units qHs and ademolog 2 Units qAC  - insulin sliding scale  - hypoglycemic protocol  -consult endocrinology
A1c 10%  Not on medications at home  - FSG monitoring  -Increased lantus 12 units qHs and ademolog 4 Units qAC  - insulin sliding scale  - hypoglycemic protocol  -endocrinology consult appreciated; will likely be discharged with  metformin and Ozempic.
A1c 10%  Not on medications at home  - FSG monitoring  - insulin sliding scale  - hypoglycemic protocol
A1c 10%  Not on medications at home  - FSG monitoring  -Increased lantus 15 units qHs and ADMELOG 5 Units qAC  - insulin sliding scale  - hypoglycemic protocol  -endocrinology consult appreciated; will likely be discharged with metformin and Ozempic as per endo

## 2025-05-15 NOTE — PROGRESS NOTE ADULT - ASSESSMENT
74 y.o. Male w/ hx  DM, HTN, CAD s/p stent (>1 year ago) and CABG (>5 years ago), Aortic bioprosthetic valve, BPH, HLD, who presents with worsening unstable angina x3-4 days. However cardiac enzymes were negative and Nuclear stress test showed a fixed defect so cardiology assessed pericarditis and started colchicine.

## 2025-05-15 NOTE — PROGRESS NOTE ADULT - SUBJECTIVE AND OBJECTIVE BOX
Patient is a 74y old  Male who presents with a chief complaint of Chest pain (15 May 2025 11:09)      SUBJECTIVE / OVERNIGHT EVENTS:  Patient says chest pain is still present but better and reproducible. Patient has no new complaints. Denies SOB, abdominal pain, N/V/D     MEDICATIONS  (STANDING):  aspirin 650 milliGRAM(s) Oral every 8 hours  atorvastatin 40 milliGRAM(s) Oral at bedtime  dextrose 5%. 1000 milliLiter(s) (50 mL/Hr) IV Continuous <Continuous>  dextrose 5%. 1000 milliLiter(s) (100 mL/Hr) IV Continuous <Continuous>  dextrose 50% Injectable 25 Gram(s) IV Push once  dextrose 50% Injectable 12.5 Gram(s) IV Push once  dextrose 50% Injectable 25 Gram(s) IV Push once  glucagon  Injectable 1 milliGRAM(s) IntraMuscular once  heparin   Injectable 5000 Unit(s) SubCutaneous every 8 hours  insulin glargine Injectable (LANTUS) 15 Unit(s) SubCutaneous at bedtime  insulin lispro (ADMELOG) corrective regimen sliding scale   SubCutaneous three times a day before meals  insulin lispro (ADMELOG) corrective regimen sliding scale   SubCutaneous at bedtime  insulin lispro Injectable (ADMELOG) 5 Unit(s) SubCutaneous three times a day before meals  lisinopril 5 milliGRAM(s) Oral daily  metoprolol tartrate 25 milliGRAM(s) Oral two times a day    MEDICATIONS  (PRN):  acetaminophen     Tablet .. 650 milliGRAM(s) Oral every 6 hours PRN Temp greater or equal to 38C (100.4F), Mild Pain (1 - 3)  aluminum hydroxide/magnesium hydroxide/simethicone Suspension 30 milliLiter(s) Oral every 4 hours PRN Dyspepsia  dextrose Oral Gel 15 Gram(s) Oral once PRN Blood Glucose LESS THAN 70 milliGRAM(s)/deciliter  melatonin 3 milliGRAM(s) Oral at bedtime PRN Insomnia  ondansetron Injectable 4 milliGRAM(s) IV Push every 8 hours PRN Nausea and/or Vomiting      Vital Signs Last 24 Hrs  T(C): 36.6 (15 May 2025 05:07), Max: 36.8 (14 May 2025 18:00)  T(F): 97.8 (15 May 2025 05:07), Max: 98.3 (14 May 2025 18:00)  HR: 63 (15 May 2025 05:07) (63 - 68)  BP: 117/81 (15 May 2025 05:07) (117/81 - 145/88)  BP(mean): --  RR: 18 (15 May 2025 05:07) (17 - 18)  SpO2: 98% (15 May 2025 05:07) (98% - 99%)    Parameters below as of 15 May 2025 05:07  Patient On (Oxygen Delivery Method): room air      CAPILLARY BLOOD GLUCOSE      POCT Blood Glucose.: 167 mg/dL (15 May 2025 12:29)  POCT Blood Glucose.: 133 mg/dL (15 May 2025 08:22)  POCT Blood Glucose.: 160 mg/dL (14 May 2025 22:17)  POCT Blood Glucose.: 130 mg/dL (14 May 2025 17:44)    I&O's Summary      PHYSICAL EXAM:  GENERAL: NAD, well-developed  HEAD:  Atraumatic, Normocephalic  EYES: EOMI, PERRLA, conjunctiva and sclera clear  NECK: Supple, No JVD  CHEST/LUNG: Clear to auscultation bilaterally; No wheeze  HEART: Regular rate and rhythm; No murmurs, rubs, or gallops  ABDOMEN: Soft, Nontender, Nondistended; Bowel sounds present  EXTREMITIES:  2+ Peripheral Pulses, No clubbing, cyanosis, or edema  PSYCH: AAOx3  NEUROLOGY: non-focal  SKIN: No rashes or lesions    LABS:                        16.4   9.84  )-----------( 297      ( 15 May 2025 06:52 )             46.8     05-15    135  |  104  |  17  ----------------------------<  141[H]  3.8   |  20[L]  |  0.86    Ca    9.0      15 May 2025 06:52  Phos  3.1     05-15  Mg     2.20     05-15            Urinalysis Basic - ( 15 May 2025 06:52 )    Color: x / Appearance: x / SG: x / pH: x  Gluc: 141 mg/dL / Ketone: x  / Bili: x / Urobili: x   Blood: x / Protein: x / Nitrite: x   Leuk Esterase: x / RBC: x / WBC x   Sq Epi: x / Non Sq Epi: x / Bacteria: x        RADIOLOGY & ADDITIONAL TESTS:    Imaging Personally Reviewed:    Consultant(s) Notes Reviewed:      Care Discussed with Consultants/Other Providers:

## 2025-05-15 NOTE — DISCHARGE NOTE NURSING/CASE MANAGEMENT/SOCIAL WORK - FINANCIAL ASSISTANCE
[facility] provides services at a reduced cost to those who are determined to be eligible through [facility]’s financial assistance program. Information regarding [facility]’s financial assistance program can be found by going to [email or web address] or by calling [phone].

## 2025-05-15 NOTE — PROGRESS NOTE ADULT - PROVIDER SPECIALTY LIST ADULT
Cardiology
Endocrinology
Cardiology
Hospitalist

## 2025-05-15 NOTE — DISCHARGE NOTE PROVIDER - NSDCFUADDAPPT_GEN_ALL_CORE_FT
APPTS ARE READY TO BE MADE: [ ] YES    Best Family or Patient Contact (if needed):    Additional Information about above appointments (if needed):    1:  PCP  2: cardiologist   3:     Other comments or requests:

## 2025-05-15 NOTE — PROGRESS NOTE ADULT - PROBLEM SELECTOR PLAN 1
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

## 2025-05-15 NOTE — DISCHARGE NOTE NURSING/CASE MANAGEMENT/SOCIAL WORK - PATIENT PORTAL LINK FT
You can access the FollowMyHealth Patient Portal offered by [facility] by registering at the following website: [email or web address]. By joining [facility]’s FollowMyHealth portal, you will also be able to view your health information using other applications (apps) compatible with our system.

## 2025-05-15 NOTE — DISCHARGE NOTE PROVIDER - NSDCCAREPROVSEEN_GEN_ALL_CORE_FT
For Parents: Helping Your Teen Eat Healthy  Kids begin making their own food decisions as they grow older. You can't always have the final say. That's why you need to help your teen develop healthy eating habits. Start by following the suggestions below.    Get everybody in the kitchen!  Set aside time each week for planning and sharing meals. Have your teen help with food shopping. This provides a chance to make healthy choices. Your teen can also help prepare food, such as salad. Then the whole family should eat the meal you made together. (If the kids drink milk but Mom and Dad get soda, this sends a mixed message!)  Pay attention to portions  In these days of Big Gulps and jumbo-sized fries, it's easy to get carried away. Serve portions that make sense for your kids. If they're full, don't make them clean their plates. And if they're still hungry, offer seconds of vegetables or fruit.  Limit soda and juice drinks  Sodas and juice drinks are the real monsters when it comes to weight gain. A small soda pop is okay once in a while. But it's no substitute for healthier drinks. Sports drinks and juice drinks should be limited, too. These can contain almost as much sugar as soda! Half a cup of 100% fruit juice each day is fine--but that's all kids need. The rest of the time, water and low-fat or nonfat milk are best.  Good choices anytime  · Fruits and vegetables  · Whole-grain breads and cereals  · Low-fat or nonfat milk, yogurt, and other dairy  · Lean protein, like fish, skinless chicken breast, tofu, and beans  · Water!  Only once in a while  · Sweets, such as candy bars, cookies, and ice cream  · Salty snacks, like chips  · Fried food, like French fries and potato nuggets  · Soda, sports drinks, and sugary juice drinks   Time-saving tips  When you're busy, it can be hard to eat healthy. These tips help you save time AND calories:  · Keep healthy, ready-to-eat snacks around the house, such as carrot sticks,  apple slices, trail mix, and low-fat yogurt cups.  · Combine store-prepared foods (like broiled chicken) with fresh vegetables.  · Cook large meals on the weekend and freeze the rest for leftovers.  · If you can't avoid fast food, choose healthier options, like a salad instead of fries. And don't be tempted by giant-sized \"value\" meals.  Step-by-step changes  Taste buds need time to get used to new flavors. Start slow. Let your kids pick out vegetables and fruits they want to try. If a food's not a hit at first, serve it again in a few weeks. Over time, an unfamiliar taste may become a new favorite. And remember, healthy eating isn't all or nothing. Even small changes are better than none.  © 3785-4000 [employer]. [address] [place] [address]. All rights reserved. This information is not intended as a substitute for professional medical care. Always follow your healthcare professional's instructions.      Nutrition and MyPlate: Dairy    The dairy group includes foods that are made from milk and are also high in calcium (a nutrient that builds strong bones). If you're lactose-intolerant, special milk products can help. If you're allergic to dairy, be sure to get your calcium from leafy greens (such as mustard or samanta greens) and from calcium-fortified foods (such as orange juice and soy products).  Nutrient-rich choices   It's best to choose low-fat or nonfat dairy products. Nutrient-rich choices include:  · Good old-fashioned milk (low-fat or nonfat). If you don't like the flavor, stir in a little chocolate syrup, or vanilla or almond extract. The nutrients in the milk outweigh the added calories.  · Low-fat or nonfat cheese, cottage cheese, and yogurt.  · Foods made with these products, such as cream of broccoli soup made with nonfat milk or quesadillas made with low-fat cheese.  What makes dairy less healthy?  · Many dairy products are high in fat. Always look for low-fat or nonfat  varieties. You can ease into this. If you drink whole milk now, make the move to 2% milk first, then to fat-free or low-fat (1%) milk.  · Most cheeses are high in fat. If you select a cheese with a strong taste, you may eat less than you would of a milder cheese. Also look for low-fat cheese or cheese made with part skim milk.  · Added sugar, such as in ice cream and frozen yogurt, makes dairy products less healthy. Compare food labels to find brands lower in fat and calories.  One small change  Drink low-fat or nonfat milk with at least one meal each day. Have a better idea? Write it here:     _____________________________________________________________  © 8114-0679 [employer]. [address] [place] [address]. All rights reserved. This information is not intended as a substitute for professional medical care. Always follow your healthcare professional's instructions.      For Kids: Maintaining a Healthy Weight  Have you heard of TV Zombies and Soda Monsters? If not, then listen up. These creepy creatures live in every town. They can sneak up at any moment. First the TV Zombie slows you down. Then the Soda Monster stuffs you with sugar. Together, they can make you gain too much weight. How do you stop them? Keep reading to find out!     Turn Off the TV! To stop the TV Zombie, get up and play!   Keep zombies away with play  If you watch TV all day, the TV Zombie will turn your muscles into jelly. So what do you do? It's simple. Get moving! Do things you think are fun. The TV Zombie is lazy. If you play every day, there's no way it can catch you. Try lots of different things until you find what YOU like. Then cut loose! Shoot hoops with a friend. Or, dance to music. It doesn't really matter as long as you're having fun!  Go for it!  When it comes to play, don't hold back. Play until you breathe harder and sweat. Do this every day. Playing hard helps you keep up during PE or gym. You'll feel  stronger, too! And don't forget: Anyone can play hard. Healthy, strong bodies come in all shapes and sizes.     Stop the Pop! To stop the soda monster, drink water or milk when you're thirsty.   Soak the Soda Monster  TV commercials never show the Soda Monster. Instead, they make it seem like drinking soda or sports drinks will make you move faster. But this isn't the truth. Those drinks are full of sugar. And drinking sugary stuff all the time can make you gain too much weight. It can even rot your teeth. Yuck! The best drinks for you are water and milk. Drink them every day. If you do, the soda monster won't know what hit it!  Great choices keep you going  When you play hard, you need the right fuel. Fruits and veggies have vitamins that keep your body healthy. Foods like fish, beans, and chicken help build strong muscles. And things like rice, wheat bread, and tortillas give you energy to play. Eat healthy foods anytime. But beware of junk foods. They can slow you down.  Soda Monster math  Did you know one soda has about 10 spoonfuls of sugar in it?! Too much sugar is bad for you. How much sugar do YOU drink? Multiply the number of sodas you drink in a week by 10. That's how many spoonfuls of sugar you stuff in!!!  © 3692-3132 [employer]. [address] [place] [address], [place] [address]. All rights reserved. This information is not intended as a substitute for professional medical care. Always follow your healthcare professional's instructions.        Understanding Body Mass Index (BMI)  Body mass index (BMI) is a ratio of your height to your weight. The BMI is a measure of overweight that is corrected for height. Knowing your BMI is a way of finding whether you are at a healthy weight. The higher your BMI, the greater your risk for weight-related health problems.  What BMI means  · BMI below 18.5: Underweight  · BMI 18.5 to 24.9: Healthy weight or \"ideal body weight\"   · BMI 25 to 29.9: Overweight  · BMI  30 and over: Obese  · BMI 40 and over: Severe obesity   · BMI 50 and over: Super obesity   Using the BMI chart  To figure your BMI, find your height and weight (or the numbers closest to them) on the chart below. Follow each column of numbers to where your height and weight meet on the chart. That is your BMI. You can also calculate your BMI using the formula below.    Using the BMI Formula Example   Multiply your weight in pounds by 703. 160 pounds x 703= [id number]   Divide the answer by your height in inches. [id number] ÷ 63 inches= 1785   Divide this number by your height in inches again. This is your BMI. 1785 ÷ 63 inches= BMI of 28   © 7790-3666 [employer]. [address] [place] [address], [place] [address]. All rights reserved. This information is not intended as a substitute for professional medical care. Always follow your healthcare professional's instructions.        Teen Immunization Recommendations  Vaccine How Often Disease Prevented Recommended For:   Hepatitis A (HepA) 2 doses Hepatitis A, an infection that can cause acute liver inflammation and jaundice (yellowing of the skin and whites of the eyes) Anyone who hasn’t been vaccinated and is at risk of yolanda hepatitis A.   Hepatitis B (HepB) 3 doses Hepatitis B, an infection that causes severe, chronic liver disease Anyone who didn’t receive all doses as a child   Human Papillomavirus (HPV) 3 doses Human papillomavirus, a virus that causes genital warts and may increase risk of cervical, vaginal, vulvar, and anal cancers Girls starting at age 11 or 12 (minimum age 9); boys between ages 9 and 18   Influenza 1 dose every year Influenza, a viral illness that can cause severe respiratory problems All children ages 6 months through 18 years and adults 19 and older   Measles, Mumps, Rubella (MMR) 2 doses Measles, a disease that causes red spots on the skin, fever, and coughing  Mumps, a disease that causes swelling in the salivary glands and may affect  the ovaries or testicles  Rubella (Tamazight measles), a disease that can cause rash, mild fever, and arthritis; if caught by a pregnant woman, can cause birth defects Anyone who didn’t receive 2 doses as a child. There is a booster recommended as an adult 19 years and up after the primary series in childhood.   Meningococcal (MCV) 1 or more doses Bacterial meningitis, an inflammation of the membrane covering the brain and spinal cord; can lead to death Once at 11 through 12 years, with a booster at 16. If vaccinated at 13 through 15 years, a booster is needed at 16 through 18 years. College freshmen should be vaccinated if they have not been before.  Note: If child has low immune system because of HIV or other medical condition, the health care provider may recommend vaccinating child at a younger age than 13.   Pneumococcal (PPSV) 1 or more doses Pneumonia, a disease that causes inflammation of the lungs and can lead to death Any teen with a health condition, or contact with someone at high risk   Polio (IPV) 3 or 4 doses Polio, a disease that causes paralysis and can lead to death Anyone who didn’t receive all doses as a child   Tetanus, Diphtheria, and Pertussis (Tdap) · 5 initial drs Tetanus (lockjaw), a disease that causes muscles to spasm  Diphtheria, an infection that causes fever, weakness, and breathing problems  Pertussis (whooping cough), an infection that causes a severe cough Anyone who hasn’t had his or her 5 initial doses of DTaP, or hasn’t had a booster in the last 10 years, and then a Td every 10 years. The Tdap replaces 1 of the Td boosters.   Varicella 2 doses Chickenpox, a disease that causes itchy skin bumps, fever, and fatigue; can lead to scarring, pneumonia, or brain inflammation Anyone who previously did not receive both doses   Immunization schedule based on the CDC National Immunization Program recommendations as of January 1, 2014, as approved by the CDC Advisory Committee on Immunization  Practices, the American Academy of Pediatrics, and the American Academy of Family Physicians.  © 5034-6751 [employer]. [address] [place] [address], [place] [address]. All rights reserved. This information is not intended as a substitute for professional medical care. Always follow your healthcare professional's instructions.        Concussion (Child)  A concussion can be caused by a direct blow to the head, neck, face, or somewhere else on the body with the force being transmitted to the head. This can cause headache, nausea, vomiting, or dizziness. A child’s behavior, walk, or speech can change. Your child may also lose consciousness for a time.  It can take from a few hours up to a few days to get better. The length of time depends on how hard the blow to the head was. In some case, symptoms last a few months or longer. This is called post-concussion syndrome.  Symptoms should get better as the hours and days go by. Symptoms that get worse could be a sign of a brain injury. Watch for the warning signs listed below. Your child’s healthcare provider will tell you about any other care needed.  Home care  If your child's injury is mild and there are no serious signs or symptoms, you can monitor him or her at home.  If there is evidence that the injury is more serious, your child should be seen by a healthcare provider or the emergency department. Follow these guidelines when caring for your child at home:  · Waking your child during sleep after a minor head injury is usually not necessary. If your child's healthcare provider recommends waking your child, your child should be able to recognize his or her surroundings when awakened. As your child's healthcare provider if it is necessary to awaken your child during the night and if so, how often. Otherwise, allow your child to rest as needed.  · Carefully watch your child for any of signs of problems listed below. If you notice any of them, call 911 right  away.  · Ask your child's healthcare provider when it will be safe to allow your child to return to normal play if he or she remains free of symptoms.  · Do not return to sports or any activity that could result in another head injury until all symptoms are gone and your child has been cleared by your doctor. A second head injury before fully recovering from the first one can lead to serious brain injury. Ask your child’s healthcare provider if you have questions about when your child can return to playing sports.  · Do not usre aspirin or ibuprofen after a head injury. Your may use acetaminophen to control pain, unless another pain medicine was prescried. If your child has chronic liver or kidney disease, or ever had a stomach ulcer or gastrointestinal bleeding, talk with your doctor before using these medicines.  · If there is swelling of the face or scalp, apply an kurtis pack (ice cubes in a plastic bag, wrapped in a thin towel). Do this for 20 minutes every 2 to 2 hours until the swelling starts to go down.  · School and other activities that require concentration or attention can be more difficult after a concussion and may delay recovery. Ask your child's healthcare provider if it is safe for your child to return to school or participate in other activities that require high concentration or attention.  Follow-up care  Follow up with your child’s healthcare provider.  Special note to parents  Healthcare providers are trained to see injuries such as this in young children as a sign of possible abuse. You may be asked questions about how your child was injured. Healthcare providers are required by law to ask you these questions. This is done to protect your child. Please try to be patient.  When to seek medical advice  Call your child's healthcare provider right away if any of these occur:  · Fever as directed by your healthcare provider or:  ¨ Your child is younger than 12 weeks and has a fever of 100.4°F (38°C)  or higher because your baby may need to be seen by his or her healthcare provider  ¨ Your child has repeated fevers above 104°F (40°C) at any age.  ¨ Your child is younger than 2 years old and his or her fever continues for more than 24 hours or your child is 2 years old or older and his or her fever continues for more than 3 days.  · Swelling or bruising on head that gets worse  · Bulging soft spot on top of head in a baby  · Pain doesn’t get better, or gets worse. Babies may show pain as crying or fussing that can’t be soothed.  · Eyes that look black from very-large pupils  · One pupil is larger or smaller than the other  · Vacant stare  · Clear or bloody fluid coming from ear or nose  · Neck pain or stiffness  · Headache  · Clumsiness or shaking  · Confusion  · Abnormal behavior  · Dizziness that doesn’t go away  · Sleepiness or trouble waking from sleep  · Trouble speaking  · Trouble walking or using arms or legs  · Seizures  · Vomiting  © 1271-8066 [employer]. [address] [place] [address]. All rights reserved. This information is not intended as a substitute for professional medical care. Always follow your healthcare professional's instructions.                  What Is a Hernia?  A hernia is a weakness or defect in the wall of the abdomen. This weakness may be present at birth. Or, it can be caused by the wear and tear of daily living. If left untreated, a hernia can get worse with time and physical stress.  When a Bulge Forms  A weak area in the abdominal wall allows the contents of the abdomen to push outward. This often causes a noticeable bulge under the skin. The bulge may get bigger when you stand and go away when you lie down. You may also feel pressure or discomfort when lifting, coughing, urinating, or doing other activities.  Type of Hernias  The type of hernia you have depends on its location. Most hernias form in the groin at or near the internal ring. This is the  entrance to a canal between the abdomen and groin. Hernias can also occur in the abdomen, thigh, or genitals.    Types of Hernias  · An incisional hernia occurs at the site of a previous surgical incision.  · An umbilical hernia occurs at the navel.  · An indirect inguinal hernia occurs in the groin at the internal ring.  · A direct inguinal hernia occurs in the groin near the internal ring.  · A femoral hernia occurs just below the groin.  · An epigastric hernia occurs in the upper abdomen at the midline.  Surgery: The Best Treatment  A hernia will not heal on its own. Surgery is needed to repair the defect in the abdominal wall. If not treated, a hernia can get larger. It can also lead to serious medical complications. The good news is that hernia surgery can be done quickly and safely. In some cases, you can go home the same day as your surgery.  © 8342-5980 [employer]. [address] [place] [address], [place] [address]. All rights reserved. This information is not intended as a substitute for professional medical care. Always follow your healthcare professional's instructions.         Jimbo Mg

## 2025-05-15 NOTE — PROGRESS NOTE ADULT - PROBLEM SELECTOR PROBLEM 3
Type 2 diabetes mellitus with hyperglycemia

## 2025-05-15 NOTE — PROGRESS NOTE ADULT - ASSESSMENT
74y year old Male  with  T2DM with hyperglycemia, and CAD admitted with chest pain.     1)Type 2 Diabetes Mellitus  Home regimen: no recent meds, previously on metformin  HbA1c: 10.0 above goal     While inpatient:   - Inpatient glucose target 100-180: stable today   - Continue Lantus 15 units sq qhs   - Continue Admelog 5 units TID AC (please hold if npo/not eating)  - Continue Admelog low correction scale TID AC and separate Admelog low correction scale at bedtime   - FS before meals and at bedtime   - Consistent carb diet   - RD consult: completed   - Hypoglycemia Protocol     Discharge planning:     Please discharge pt on  metformin 500 mg bid with meals x 7 days if tolerating increase metformin 1000mg p.o. BID with meals and Ozempic 0.25 mg weekly if tolerating on 5th will need to increase Ozempic to 0.5mg sq weekly. This si to be determined by an outside provider. Pt is aware of this.     Please check if Ozempic 0.25mg sq daily to check for insurance coverage     Pt denies any history of retinopathy, pancreatitis, and or thyroid cancer     Reviewed HbA1c, BG targets, hypoglycemia recognition and treatment, Ozempic pen plan for discharge, consistent carbohydrate diet and importance of followup. Patient performed successful Ozempic PEN return demonstration with NP using sample Ozempic PEN/injection pad    Ensure patient has working glucometer, test strips, lancets, alcohol pads, and BD kimmy pen needles; will need glucometer teaching prior to dc     For severe hypoglycemia: Please prescribe Glucose tablets 4G (take 4 tablets) or 15G tablets for blood sugar less than 70 mG/dL repeat fingerstick in 15 minutes.    Please follow up with opthalmology and podiatry as an outpt     Pt should follow up with Endocrinology post discharge: [phone]: requested an appointment on 5/15/2025     2)Hypertension  - continue metoprolol, lisinopril  - please obtain urine micro albumin cr ratio as an outpt   - Defer to primary team     3)Hyperlipidemia  - continue atorvastatin if no contraindications  - LDL goal <70   - Please obtain lipid profile as an outpt   - Defer to primary team     D/w Ruth Hernandez  Nurse Practitioner  Division of Endocrinology & Diabetes  Available [employer] Teams    If before 9AM or after 6PM, or on weekends/holidays, please call endocrine answering service for assistance ([phone]).For nonurgent matters email [email or web address] for assistance.

## 2025-05-15 NOTE — DISCHARGE NOTE PROVIDER - NSDCMRMEDTOKEN_GEN_ALL_CORE_FT
semaglutide 0.25 mg/0.5 mL (0.25 mg dose) subcutaneous solution: 0.25 milligram(s) subcutaneously once a week   aspirin 81 mg oral tablet, chewable: 1 tab(s) chewed once a day  atorvastatin 40 mg oral tablet: 1 tab(s) orally once a day (at bedtime)  glucose 45% oral gel: 15 gram(s) orally prn  lisinopril 5 mg oral tablet: 1 tab(s) orally once a day  melatonin 3 mg oral tablet: 1 tab(s) orally once a day (at bedtime) As needed Insomnia  metoprolol tartrate 25 mg oral tablet: 1 tab(s) orally 2 times a day  semaglutide 0.25 mg/0.5 mL (0.25 mg dose) subcutaneous solution: 0.25 milligram(s) subcutaneous once a week   alcohol swabs: Apply topically to affected area 4 times a day  aspirin 81 mg oral tablet, chewable: 1 tab(s) chewed once a day  atorvastatin 40 mg oral tablet: 1 tab(s) orally once a day (at bedtime)  glucometer (per patient&#x27;s insurance): Test blood sugars four times a day. Dispense #1 glucometer.  glucose 45% oral gel: 15 gram(s) orally prn  lancets: 1 application subcutaneously 4 times a day  lisinopril 5 mg oral tablet: 1 tab(s) orally once a day  melatonin 3 mg oral tablet: 1 tab(s) orally once a day (at bedtime) As needed Insomnia  metFORMIN 500 mg oral tablet: 2 tab(s) orally 2 times a day Metformin 500 mg 2 x day with meals x 7 days if tolerating increase metformin 1000mg 2 x day  metoprolol tartrate 25 mg oral tablet: 1 tab(s) orally 2 times a day  semaglutide 0.25 mg/0.5 mL (0.25 mg dose) subcutaneous solution: 0.25 milligram(s) subcutaneous once a week  test strips (per patient&#x27;s insurance): 1 application subcutaneously 4 times a day. ** Compatible with patient&#x27;s glucometer **   aspirin 81 mg oral tablet, chewable: 1 tab(s) chewed once a day  atorvastatin 40 mg oral tablet: 1 tab(s) orally once a day (at bedtime)  glucose 45% oral gel: 15 gram(s) orally prn  lisinopril 5 mg oral tablet: 1 tab(s) orally once a day  melatonin 3 mg oral tablet: 1 tab(s) orally once a day (at bedtime) As needed Insomnia  metFORMIN 500 mg oral tablet: 2 tab(s) orally 2 times a day Metformin 500 mg 2 x day with meals x 7 days if tolerating increase metformin 1000mg 2 x day  metoprolol tartrate 25 mg oral tablet: 1 tab(s) orally 2 times a day  Ozempic 2 mg/3 mL (0.25 mg or 0.5 mg dose) subcutaneous solution: 0.25 milligram(s) subcutaneously once a week

## 2025-05-15 NOTE — PROGRESS NOTE ADULT - SUBJECTIVE AND OBJECTIVE BOX
Cardiology Progress Note  ------------------------------------------------------------------------------------------  SUBJECTIVE:   No events overnight. Denies, SOB or Palpitations. Mild reproducible chest wall tenderness is unchaged  -------------------------------------------------------------------------------------------  ROS:  CV: chest pain (-), palpitation (-), orthopnea (-), PND (-), edema (-)  PULM: SOB (-), cough (-), wheezing (-), hemoptysis (-).   CONST: fever (-), chills (-) or fatigue (-)  GI: abdominal distension (-), abdominal pain (-) , nausea/vomiting (-), hematemesis, (-), melena (-), hematochezia (-)  : dysuria (-), frequency (-), hematuria (-).   NEURO: numbness (-), weakness (-), dizziness (-)  MSK: myalgia (-), joint pain (-)   SKIN: itching (-), rash (-)  HEENT:  visual changes (-); vertigo or throat pain (-);  neck stiffness (-)   Psych: change in mood (-), anxiety (-), depression (-)     All other review of systems is negative unless indicated above.   -------------------------------------------------------------------------------------------  VS:  T(F): 97.8 (05-15), Max: 98.3 (05-14)  HR: 63 (05-15) (63 - 68)  BP: 117/81 (05-15) (117/81 - 157/76)  RR: 18 (05-15)  SpO2: 98% (05-15)  I&O's Summary    ------------------------------------------------------------------------------------------  PHYSICAL EXAM:  GENERAL: NAD  HEAD: Atraumatic, Normocephalic.  ENT: EOMI, Moist mucous membranes.  NECK: Supple  CHEST/LUNG: Clear to auscultation bilaterally; No rales, rhonchi, wheezing, or rubs. Unlabored respirations; area of CP on L lower chest tender to palpation  HEART: Regular rate and rhythm; No murmurs, rubs, or gallops.  EXTREMITIES: extremities warm, well perfused. No noted LE edema.   ----------------------------------------------------------------------------------------------------------------------------------------------------------------------------------  LABS:  05-15    135  |  104  |  17  ----------------------------<  141[H]  3.8   |  20[L]  |  0.86    Ca    9.0      15 May 2025 06:52  Phos  3.1     05-15  Mg     2.20     05-15      Creatinine Trend: 0.86<--, 0.79<--, 0.77<--, 0.75<--, 0.77<--                        16.4   9.84  )-----------( 297      ( 15 May 2025 06:52 )             46.8         Lipid Panel: T(F): 97.8 (05-15), Max: 98.3 (05-14)  HR: 63 (05-15) (63 - 68)  BP: 117/81 (05-15) (117/81 - 157/76)  RR: 18 (05-15)  SpO2: 98% (05-15)  Cardiac Enzymes:         -------------------------------------------------------------------------------------------  Meds:  acetaminophen     Tablet .. 650 milliGRAM(s) Oral every 6 hours PRN  aluminum hydroxide/magnesium hydroxide/simethicone Suspension 30 milliLiter(s) Oral every 4 hours PRN  aspirin 650 milliGRAM(s) Oral every 8 hours  atorvastatin 40 milliGRAM(s) Oral at bedtime  colchicine 0.6 milliGRAM(s) Oral two times a day  dextrose 5%. 1000 milliLiter(s) IV Continuous <Continuous>  dextrose 5%. 1000 milliLiter(s) IV Continuous <Continuous>  dextrose 50% Injectable 25 Gram(s) IV Push once  dextrose 50% Injectable 12.5 Gram(s) IV Push once  dextrose 50% Injectable 25 Gram(s) IV Push once  dextrose Oral Gel 15 Gram(s) Oral once PRN  glucagon  Injectable 1 milliGRAM(s) IntraMuscular once  heparin   Injectable 5000 Unit(s) SubCutaneous every 8 hours  insulin glargine Injectable (LANTUS) 15 Unit(s) SubCutaneous at bedtime  insulin lispro (ADMELOG) corrective regimen sliding scale   SubCutaneous three times a day before meals  insulin lispro (ADMELOG) corrective regimen sliding scale   SubCutaneous at bedtime  insulin lispro Injectable (ADMELOG) 5 Unit(s) SubCutaneous three times a day before meals  lisinopril 5 milliGRAM(s) Oral daily  melatonin 3 milliGRAM(s) Oral at bedtime PRN  metoprolol tartrate 25 milliGRAM(s) Oral two times a day  ondansetron Injectable 4 milliGRAM(s) IV Push every 8 hours PRN    -------------------------------------------------------------------------------------------  Cardiovascular Diagnostic Testing:  -------------------------------------------------------------------------------------------  ECG: NSR, 1st degree AVB, LAFB    Echo:   TTE 5/12/25     CONCLUSIONS:      1. Left ventricular cavity is normal in size. Left ventricular wall thickness is normal. Left ventricular systolic function is normal with an ejection fraction of 62 % by Linares's method of disks. There are no regional wall motion abnormalities seen.   2. There is mild (grade 1) left ventricular diastolic dysfunction.   3. Normal right ventricular cavity size and probably normal right ventricular systolic function. Tricuspid annular plane systolic excursion (TAPSE) is 2.1 cm (normal >=1.7 cm).   4. Structurally normal mitral valve with normal leaflet excursion. There is calcification of the mitral valve annulus. There is trace mitral regurgitation.   5. A bioprosthetic valve replacement is present in the aortic position. The peak transaortic velocity is 1.88 m/s, peak transaortic gradient is 14.1 mmHg and mean transaortic gradient is 7.0 mmHg with an LVOT/aortic valveVTI ratio of 0.54. There is no intravalvular regurgitation.    Stress:  NM Stress Test 5/13/25:    Conclusions:   1. Stress electrocardiogram: No ischemic ST segment changes.   2. Qualitative Perfusion:      - large-sized, severe defect(s) in the lateral wall that is fixed suggestive of an infarct.   3.The post stress left ventricular EF is 54 %. The stress end diastolic volume is 106 ml and systolic volume is 49 ml.   4. RVEF 68%; RVEDV 96 mL; RVESV 31 mL.   5. Hypokinesis of the lateral wall. Normal regional wall motion of the remaining wall.      CXR:  reviewed  -------------------------------------------------------------------------------------------  Assessment and Plan:   -------------------------------------------------------------------------------------------  Mr. Batres is a 74 year old man with PMHx DM, HTN, CAD s/p stent (>1 year ago) and CABG and AVR forr biscuspid aortic valve (>5 years ago), BPH, HLD, who presents with intermediate-risk chest pain. Serial negative hs-Troponins have ruled-out ACS. TTE with pEF, grade I DD, s/p bio-AVR with no significant valvular pathology. Now pending NM stress test.     Problems Assessed:   #Chest pain  #CAD s/p PCI and CABG  #AS s/p AVR    Plan:   - Chest pain atypical, reproducible and at time positionally related. Serial hsTrops WNL. Suspect muscleskeletal in origin; would DC pericarditis treatment (DC high dose ASA< return to 81 mg QD, DC colchicine)  - TTE with pEF, grade I DD, s/p bio-AVR with no significant valvular pathology  - Stress test w/ fixed defect    No further inpatient cardiac workup at this time. Please ensure close cardiology OP follow up upon DC    ------------------------------------------------------------------------------------------  Jose Luis aPtino MD   of Cardiology  [facility] at [address], [address] [place], [place] [address]  Phone: [phone]  Fax: [phone]  Please check [email or web address]: [email or web address] for cardiology service schedule and contact information.   ------------------------------------------------------------------------------------------  Billing Statement:   76/56/51/36 minutes spent on total encounter. Necessity of time spent during this encounter on this date of service was due to review of medical information in EMR, co-ordination of hospital and outpatient care, discussion with patient and communication with primary team.   ------------------------------------------------------------------------------------------- Cardiology Progress Note  ------------------------------------------------------------------------------------------  SUBJECTIVE:   No events overnight. Denies, SOB or Palpitations. Mild reproducible chest wall tenderness is unchaged  -------------------------------------------------------------------------------------------  ROS:  CV: chest pain (-), palpitation (-), orthopnea (-), PND (-), edema (-)  PULM: SOB (-), cough (-), wheezing (-), hemoptysis (-).   CONST: fever (-), chills (-) or fatigue (-)  GI: abdominal distension (-), abdominal pain (-) , nausea/vomiting (-), hematemesis, (-), melena (-), hematochezia (-)  : dysuria (-), frequency (-), hematuria (-).   NEURO: numbness (-), weakness (-), dizziness (-)  MSK: myalgia (-), joint pain (-)   SKIN: itching (-), rash (-)  HEENT:  visual changes (-); vertigo or throat pain (-);  neck stiffness (-)   Psych: change in mood (-), anxiety (-), depression (-)     All other review of systems is negative unless indicated above.   -------------------------------------------------------------------------------------------  VS:  T(F): 97.8 (05-15), Max: 98.3 (05-14)  HR: 63 (05-15) (63 - 68)  BP: 117/81 (05-15) (117/81 - 157/76)  RR: 18 (05-15)  SpO2: 98% (05-15)  I&O's Summary    ------------------------------------------------------------------------------------------  PHYSICAL EXAM:  GENERAL: NAD  HEAD: Atraumatic, Normocephalic.  ENT: EOMI, Moist mucous membranes.  NECK: Supple  CHEST/LUNG: Clear to auscultation bilaterally; No rales, rhonchi, wheezing, or rubs. Unlabored respirations; area of CP on L lower chest tender to palpation  HEART: Regular rate and rhythm; No murmurs, rubs, or gallops.  EXTREMITIES: extremities warm, well perfused. No noted LE edema.   ----------------------------------------------------------------------------------------------------------------------------------------------------------------------------------  LABS:  05-15    135  |  104  |  17  ----------------------------<  141[H]  3.8   |  20[L]  |  0.86    Ca    9.0      15 May 2025 06:52  Phos  3.1     05-15  Mg     2.20     05-15      Creatinine Trend: 0.86<--, 0.79<--, 0.77<--, 0.75<--, 0.77<--                        16.4   9.84  )-----------( 297      ( 15 May 2025 06:52 )             46.8         Lipid Panel: T(F): 97.8 (05-15), Max: 98.3 (05-14)  HR: 63 (05-15) (63 - 68)  BP: 117/81 (05-15) (117/81 - 157/76)  RR: 18 (05-15)  SpO2: 98% (05-15)  Cardiac Enzymes:         -------------------------------------------------------------------------------------------  Meds:  acetaminophen     Tablet .. 650 milliGRAM(s) Oral every 6 hours PRN  aluminum hydroxide/magnesium hydroxide/simethicone Suspension 30 milliLiter(s) Oral every 4 hours PRN  aspirin 650 milliGRAM(s) Oral every 8 hours  atorvastatin 40 milliGRAM(s) Oral at bedtime  colchicine 0.6 milliGRAM(s) Oral two times a day  dextrose 5%. 1000 milliLiter(s) IV Continuous <Continuous>  dextrose 5%. 1000 milliLiter(s) IV Continuous <Continuous>  dextrose 50% Injectable 25 Gram(s) IV Push once  dextrose 50% Injectable 12.5 Gram(s) IV Push once  dextrose 50% Injectable 25 Gram(s) IV Push once  dextrose Oral Gel 15 Gram(s) Oral once PRN  glucagon  Injectable 1 milliGRAM(s) IntraMuscular once  heparin   Injectable 5000 Unit(s) SubCutaneous every 8 hours  insulin glargine Injectable (LANTUS) 15 Unit(s) SubCutaneous at bedtime  insulin lispro (ADMELOG) corrective regimen sliding scale   SubCutaneous three times a day before meals  insulin lispro (ADMELOG) corrective regimen sliding scale   SubCutaneous at bedtime  insulin lispro Injectable (ADMELOG) 5 Unit(s) SubCutaneous three times a day before meals  lisinopril 5 milliGRAM(s) Oral daily  melatonin 3 milliGRAM(s) Oral at bedtime PRN  metoprolol tartrate 25 milliGRAM(s) Oral two times a day  ondansetron Injectable 4 milliGRAM(s) IV Push every 8 hours PRN    -------------------------------------------------------------------------------------------  Cardiovascular Diagnostic Testing:  -------------------------------------------------------------------------------------------  ECG: NSR, 1st degree AVB, LAFB    Echo:   TTE 5/12/25     CONCLUSIONS:      1. Left ventricular cavity is normal in size. Left ventricular wall thickness is normal. Left ventricular systolic function is normal with an ejection fraction of 62 % by Linares's method of disks. There are no regional wall motion abnormalities seen.   2. There is mild (grade 1) left ventricular diastolic dysfunction.   3. Normal right ventricular cavity size and probably normal right ventricular systolic function. Tricuspid annular plane systolic excursion (TAPSE) is 2.1 cm (normal >=1.7 cm).   4. Structurally normal mitral valve with normal leaflet excursion. There is calcification of the mitral valve annulus. There is trace mitral regurgitation.   5. A bioprosthetic valve replacement is present in the aortic position. The peak transaortic velocity is 1.88 m/s, peak transaortic gradient is 14.1 mmHg and mean transaortic gradient is 7.0 mmHg with an LVOT/aortic valveVTI ratio of 0.54. There is no intravalvular regurgitation.    Stress:  NM Stress Test 5/13/25:    Conclusions:   1. Stress electrocardiogram: No ischemic ST segment changes.   2. Qualitative Perfusion:      - large-sized, severe defect(s) in the lateral wall that is fixed suggestive of an infarct.   3.The post stress left ventricular EF is 54 %. The stress end diastolic volume is 106 ml and systolic volume is 49 ml.   4. RVEF 68%; RVEDV 96 mL; RVESV 31 mL.   5. Hypokinesis of the lateral wall. Normal regional wall motion of the remaining wall.      CXR:  reviewed  -------------------------------------------------------------------------------------------  Assessment and Plan:   -------------------------------------------------------------------------------------------  Mr. Batres is a 74 year old man with PMHx DM, HTN, CAD s/p stent (>1 year ago) and CABG and AVR forr biscuspid aortic valve (>5 years ago), BPH, HLD, who presents with intermediate-risk chest pain. Serial negative hs-Troponins have ruled-out ACS. TTE with pEF, grade I DD, s/p bio-AVR with no significant valvular pathology. Now pending NM stress test.     Problems Assessed:   #Chest pain  #CAD s/p PCI and CABG  #AS s/p AVR    Plan:   - Chest pain atypical, reproducible and at time positionally related. Serial hsTrops WNL. Suspect muscleskeletal in origin; would DC pericarditis treatment (DC high dose ASA< return to 81 mg QD, DC colchicine)  - TTE with pEF, grade I DD, s/p bio-AVR with no significant valvular pathology  - Stress test w/ fixed defect    No further inpatient cardiac workup at this time. Please ensure close cardiology OP follow up upon DC    ------------------------------------------------------------------------------------------  Jose Luis Patino MD   of Cardiology  [facility] at [facility]  [address] [facility] [address], [address] [place], [place] [address]  Phone: [phone]  Fax: [phone]  Please check [email or web address]: [email or web address] for cardiology service schedule and contact information.

## 2025-05-15 NOTE — PROGRESS NOTE ADULT - ATTENDING COMMENTS
agree with above.
stress test fixed defect with no evidence of ischemia.   still complains of 4/10 chest pain, pleuritic and changes with position. Inflammatory markers are not elevated.   Will try empiric treatment of pericarditis  no further cardiac testing.
agree with above. Awaiting nuclear stress test today.

## 2025-05-15 NOTE — DISCHARGE NOTE PROVIDER - HOSPITAL COURSE
74M PMHx DM, HTN, CAD s/p stent (>1 year ago) and CABG (>5 years ago), BPH, HLD, who presents with worsening unstable angina x3-4 days.     Unstable Angina, Trops 12 > 14  - TTE 5/12: LVSF wnl, EF 65%   - Stress test - per Cards, abnormality likely scar tissue ---> Considering CP, treat empirically for pericarditis, Aspirin 650mg q8h & Colchicine 0.6 BID     Type 2 diabetes mellitus with hyperglycemia A1c 10%  - Endocrine consulted started: ISS + Admelog 4u TIDAC + Lantus 12u qhs  - Discharge recommendations: Discharge on metformin 500 mg bid, increased to 1000 as tolerated  and Ozempic 0.25 mg weekly      74M PMHx DM, HTN, CAD s/p stent (>1 year ago) and CABG (>5 years ago), BPH, HLD, who presents with worsening unstable angina x3-4 days.     - Significant underlying cardiac history including stent (>1 year ago) and CABG (>5 years ago)  - Minimal troponin elevation, 12 to 14  - EKG unchanged per ED  - Cardio consult- Nitro prn  - Metoprolol, lipitor, lisinopril started  - TTE- Left ventricular systolic function is normal with an ejection fraction of 62%. A bioprosthetic valve replacement is present in the aortic position    Type 2 diabetes mellitus with hyperglycemia, A1c 10%  - Not on medications at home, FSG monitoring, pt has been evaluated by endo - Endocrine consulted started: ISS + Admelog 4u TIDAC + Lantus 12u qhs  - Discharge recommendations: Discharge on metformin 500 mg bid, increased to 1000 as tolerated  and Ozempic 0.25 mg weekly     HTN    - /91 at admission   - initiated metoprolol and lisinopril.    HLD    - initiated lipitor       74M PMHx DM, HTN, CAD s/p stent (>1 year ago) and CABG (>5 years ago), BPH, HLD, who presents with worsening unstable angina x3-4 days.     - Significant underlying cardiac history including stent (>1 year ago) and CABG (>5 years ago)  - Minimal troponin elevation, 12 to 14  - EKG unchanged per ED  - Cardio consult- Nitro prn  - Metoprolol, lipitor, lisinopril started  - TTE- Left ventricular systolic function is normal with an ejection fraction of 62%. A bioprosthetic valve replacement is present in the aortic position    Type 2 diabetes mellitus with hyperglycemia, A1c 10%  - Not on medications at home, FSG monitoring, pt has been evaluated by endo - Endocrine consulted started: ISS + Admelog 4u TIDAC + Lantus 12u qhs  - Discharge recommendations: Discharge on metformin 500 mg bid, increased to 1000 as tolerated  and Ozempic 0.25 mg weekly     HTN    - /91 at admission   - initiated metoprolol and lisinopril.    HLD    - initiated lipitor    On 5/15/2025 pt is medically optimized for dc, refused home care      74 y.o. Male w/ hx  DM, HTN, CAD s/p stent (>1 year ago) and CABG (>5 years ago), Aortic bioprosthetic valve, BPH, HLD, who presents with worsening unstable angina x3-4 days. However cardiac enzymes were negative and Nuclear stress test showed a fixed defect so cardiology assessed pericarditis and started colchicine.     Acute pericarditis  -Significant underlying cardiac history including stent (>1 year ago) and CABG (>5 years ago)  Currently not taking any medications  Pain worse at rest  Minimal troponin elevation, 12 to 14  EKG unchanged per ED  - Cardio consult appreciated  - Nitro prn  - Metoprolol, lipitor, lisinopril started  - TTE- Left ventricular systolic function is normal with an ejection fraction of 62%. A bioprosthetic valve replacement is present in the aortic position  -nuclear stress test on 5/13, and 5/14 showed fixed lateral wall defect suggestive of infarct  -cardiology assessed acute pericarditis and started colchicine.      Type 2 diabetes mellitus with hyperglycemia  -Hb A1c 10%  - Not on medications at home, FSG monitoring, pt has been evaluated by endo - Endocrine consulted started: ISS + Admelog 4u TIDAC + Lantus 12u qhs  - Discharge recommendations: Discharge on metformin 500 mg bid, increased to 1000 as tolerated  and Ozempic 0.25 mg weekly     HTN    - /91 at admission   - initiated metoprolol and lisinopril.  -Now improved    HLD    - initiated lipitor    On 5/15/2025 pt is medically optimized for dc, refused home care

## 2025-05-15 NOTE — DISCHARGE NOTE PROVIDER - NSDCCPCAREPLAN_GEN_ALL_CORE_FT
PRINCIPAL DISCHARGE DIAGNOSIS  Diagnosis: Chest pain  Assessment and Plan of Treatment: - Chest pain atypical, reproducible and at time positionally related. Serial hsTrops WNL.   - TTE with pEF, grade I DD, s/p bio-AVR with no significant valvular pathology        SECONDARY DISCHARGE DIAGNOSES  Diagnosis: HLD (hyperlipidemia)  Assessment and Plan of Treatment: low fat diet, Continue taking Atorvastatin 40 mg at bed time    Diagnosis: Type 2 diabetes mellitus with hyperglycemia  Assessment and Plan of Treatment: Hg A1c 10% goal below 6.5%. YOu have been evaluated by endo team recommend to take metformin 500 mg 2 x day and increased to 1000 as tolerated  and Ozempic 0.25 mg weekly       Diagnosis: HTN (hypertension)  Assessment and Plan of Treatment: low salt diet, you were started on metoprolol 25 mg and lisinopril 5 mg   Monitor BP daily     PRINCIPAL DISCHARGE DIAGNOSIS  Diagnosis: Chest pain  Assessment and Plan of Treatment: - Chest pain atypical, reproducible and at time positionally related. Serial hsTrops WNL.   - TTE with pEF, grade I DD, s/p bio-AVR with no significant valvular pathology  please make a follow up appt with cardiologist: Dr Sukumar Amaya  [address] [facility], [address]  [place] [id number]  Phone: [phone]        SECONDARY DISCHARGE DIAGNOSES  Diagnosis: HLD (hyperlipidemia)  Assessment and Plan of Treatment: low fat diet, Continue taking Atorvastatin 40 mg at bed time    Diagnosis: Type 2 diabetes mellitus with hyperglycemia  Assessment and Plan of Treatment: Hg A1c 10% goal below 6.5%. YOu have been evaluated by endo team recommend to take metformin 500 mg 2 x day and increased to 1000 as tolerated  and Ozempic 0.25 mg weekly       Diagnosis: HTN (hypertension)  Assessment and Plan of Treatment: low salt diet, you were started on metoprolol 25 mg and lisinopril 5 mg   Monitor BP daily

## 2025-05-15 NOTE — PROGRESS NOTE ADULT - PROBLEM SELECTOR PLAN 5
Not on medications at home  - initiated lipitor

## 2025-05-15 NOTE — PROGRESS NOTE ADULT - PROBLEM SELECTOR PLAN 6
dvt ppx: heparin subq  diet: CC/DASH  code: full  dispo: pending clinical course
dvt ppx: heparin subq  diet: CC/DASH  code: full  dispo: pending cardiology recs
dvt ppx: heparin subq  diet: CC/DASH  code: full  dispo: d/c today.
dvt ppx: heparin subq  diet: CC/DASH  code: full  dispo: pending clinical course